# Patient Record
Sex: MALE | Race: WHITE | NOT HISPANIC OR LATINO | Employment: OTHER | URBAN - NONMETROPOLITAN AREA
[De-identification: names, ages, dates, MRNs, and addresses within clinical notes are randomized per-mention and may not be internally consistent; named-entity substitution may affect disease eponyms.]

---

## 2017-01-12 ENCOUNTER — TRANSCRIBE ORDERS (OUTPATIENT)
Dept: CARDIAC SURGERY | Facility: CLINIC | Age: 67
End: 2017-01-12

## 2017-01-12 DIAGNOSIS — R60.0 EDEMA OF EXTREMITIES: Primary | ICD-10-CM

## 2017-03-16 ENCOUNTER — OFFICE VISIT (OUTPATIENT)
Dept: CARDIOLOGY | Facility: CLINIC | Age: 67
End: 2017-03-16

## 2017-03-16 VITALS
BODY MASS INDEX: 31.18 KG/M2 | HEIGHT: 66 IN | SYSTOLIC BLOOD PRESSURE: 150 MMHG | HEART RATE: 66 BPM | WEIGHT: 194 LBS | DIASTOLIC BLOOD PRESSURE: 80 MMHG

## 2017-03-16 DIAGNOSIS — Z95.1 PRESENCE OF AORTOCORONARY BYPASS GRAFT: ICD-10-CM

## 2017-03-16 DIAGNOSIS — E78.00 PURE HYPERCHOLESTEROLEMIA: ICD-10-CM

## 2017-03-16 DIAGNOSIS — I10 ESSENTIAL HYPERTENSION: ICD-10-CM

## 2017-03-16 DIAGNOSIS — I25.10 ATHEROSCLEROSIS OF NATIVE CORONARY ARTERY OF NATIVE HEART WITHOUT ANGINA PECTORIS: Primary | ICD-10-CM

## 2017-03-16 PROCEDURE — 99214 OFFICE O/P EST MOD 30 MIN: CPT | Performed by: INTERNAL MEDICINE

## 2017-03-16 RX ORDER — LISINOPRIL 40 MG/1
40 TABLET ORAL 2 TIMES DAILY
Qty: 30 TABLET | Refills: 11 | Status: SHIPPED | OUTPATIENT
Start: 2017-03-16 | End: 2018-01-22

## 2017-03-16 NOTE — PROGRESS NOTES
Kenya Torres  66 y.o. male    12/14/2016  1. Atherosclerosis of native coronary artery of native heart without angina pectoris    2. Presence of aortocoronary bypass graft    3. Essential hypertension    4. Pure hypercholesterolemia        Chief complaint -follow-up CAD and mildly elevated blood pressure      History of present [id number]-old gentleman who has history of coronary disease status post CABG with two-vessel bypass with LIMA to LAD, saphenous vein graft to PDA and PLV branch.  He hasn't been able to tolerate any other cholesterol medicines.  And going to try to get him repatha through the indigent program.  He denies any chest pain and his blood pressure has been staying high and I increased his lisinopril to 40 mg twice a day.  He denies any weight gain or weight loss no TIA symptoms.  He has no GI symptoms either                Allergies   Allergen Reactions   • Cefoxitin Anaphylaxis   • Crestor [Rosuvastatin Calcium] Other (See Comments)     Other allergic reaction   • Hydrocodone Itching   • Lipitor [Atorvastatin]    • Niacin And Related    • Pravastatin Other (See Comments)     Other allergic reaction   • Zetia [Ezetimibe] Myalgia   • Zocor [Simvastatin] Other (See Comments)     Other allergic reaction   • Lortab [Hydrocodone-Acetaminophen] Rash         Past Medical History   Diagnosis Date   • CAD (coronary artery disease)    • Chest pain    • Contusion of trunk    • Coronary arteriosclerosis      2 V CAD post ptca X 3 (Cypher->RCA 2008 & Xience 2011; Xience->OM 7/2009)   • Coronary atherosclerosis of native coronary artery    • Diarrhea    • Diverticulitis of colon    • Essential hypertension    • Family history of malignant neoplasm of gastrointestinal tract    • Gastroesophageal reflux disease    • GERD (gastroesophageal reflux disease)    • History of echocardiogram 04/23/2015     Echocardiogram W/ color flow 73231 (1) - Normal LV systolic function with Ef of 60-65%. LVH and diastolic  abnormality of left ventricle.Moderate left atrial enlargement. Mild mitral and tricuspid regurgitation.Trace AV insufficiency.   • History of echocardiogram 12/27/2011     Echocardiogram 53072 (2) - Normal LV systolic function. EF 50%. Early diastolic dysfunction. Trace pulmonic regurgitation.   • History of EKG 05/21/2015     EKG Tracing and Interpretation 33413 (1) - [facility] ([facility]   • Hyperlipidemia    • Hypertension    • Male erectile disorder      ? meds vs local pathology   • Presence of aortocoronary bypass graft    • Raised prostate specific antigen    • Surgical follow-up care      Surgical follow-up     • Upper respiratory infection      resolved   • Viral gastroenteritis          Past Surgical History   Procedure Laterality Date   • Colon surgery     • Appendectomy  07/22/2008   • Coronary artery bypass graft  05/05/2015     CABG (1) - CABG x 3. Placement of LIMA>LAD. SVG>PDA. Endoscopic vein harvest.   • Cardiac catheterization  04/23/2015     Cardiac cath 21634 (2) - Two vessel coronary artery disease with multiple tandem lesions with normal LV systolic function.   • Cholecystectomy  05/28/2004     Cholecystectomy, laparoscopic (1)   • Endoscopy and colonoscopy  05/06/2004     Colon endoscopy 30801 (1) - plus operative flex sig 2008, tics only   • Colonoscopy  09/09/2014     Colonoscopy, diagnostic (screening) 88082- Prior resection of the sigmoid colon was found. Stool aspirated for study. Hemorrhoids found.   • Colostomy  07/22/2008     Colostomy (1) - transverse for diverticulitis, closed 10/23/2008   • Coronary stent placement  07/08/2009     Coronary artery stent placement (1) - obtuse marginal, Dr Sykes   • Endoscopy  10/17/2003     EGD w/ tube 52691 (1)         Family History   Problem Relation Age of Onset   • Hearing loss Mother    • Coronary artery disease Father    • Heart disease Father    • Other Father      CABG   • Macular degeneration Father    • Coronary artery  disease Brother    • Heart disease Brother    • Other Brother      62 CABG  fundoplication   • Coronary artery disease Maternal Grandfather    • Heart disease Maternal Grandfather    • Sudden death Maternal Grandfather    • Diabetes Other    • Hearing loss Other    • Hypertension Other    • Skin cancer Other    • Stroke Other    • Vision loss Other    • Coronary artery disease Other          Social History     Social History   • Marital status:      Spouse name: N/A   • Number of children: N/A   • Years of education: N/A     Occupational History   • Not on file.     Social History Main Topics   • Smoking status: Never Smoker   • Smokeless tobacco: Never Used   • Alcohol use No   • Drug use: No   • Sexual activity: Defer      Comment:      Other Topics Concern   • Not on file     Social History Narrative         Current Outpatient Prescriptions   Medication Sig Dispense Refill   • clopidogrel (PLAVIX) 75 MG tablet Take 75 mg by mouth Daily.     • diltiaZEM CD (CARDIZEM CD) 240 MG 24 hr capsule Take 240 mg by mouth Daily.     • diphenhydrAMINE-acetaminophen (TYLENOL PM)  MG tablet per tablet Take 1 tablet by mouth At Night As Needed for sleep.     • lisinopril (PRINIVIL,ZESTRIL) 40 MG tablet Take 1 tablet by mouth 2 (Two) Times a Day. 30 tablet 11   • Omega 3 1200 MG capsule Take 2 tablets by mouth Daily.     • omeprazole (priLOSEC) 20 MG capsule Take 20 mg by mouth Daily.     • Red Yeast Rice 600 MG capsule Take 1 tablet by mouth Daily.     • sertraline (ZOLOFT) 100 MG tablet Take 100 mg by mouth Daily.       No current facility-administered medications for this visit.          Review of Systems     Constitution: Denies any fatigue, fever or chills    HENT: Denies any headache, hearing impairment, nasal discharge or sore throat    Eyes: Denies any blurring of vision, or photophobia     Cardivascular - As per history of present illness     Respiratory system-denies any COPD, shortness of breath,  "sleep apnea.     Endocrine:   history of hyperlipidemia cannot tolerate statins or Zetia       Musculoskeletal:   history of arthritis, musculoskeletal problems    Gastrointestinal: No nausea, vomiting, or melena    Genitourinary: No dysuria or hematuria    Neurological:   No history of seizure disorder, stroke, memory problems    Psychiatric/Behavioral:        No history of depression, bipolar disorder or schizophrenia     Hematological- no history of easy bruising or any bleeding diathesis            OBJECTIVE    Visit Vitals   • /80   • Pulse 66   • Ht 66\" (167.6 cm)   • Wt 194 lb (88 kg)   • BMI 31.31 kg/m2         Physical Exam     Constitutional: is oriented to person, place, and time.     Skin-warm and dry    Well developed and nourished in no acute distress      Head: Normocephalic and atraumatic.     Eyes: Pupils are equal, round, and reactive to light.     Neck: Neck supple. No bruit in the carotids, no elevation of JVD    Cardiovascular: [place] in the fifth intercostal space, regular rate, and  Rhythm,  S1 greater than S2, no S3 or S4, no gallop   No murmur heard.    Pulmonary/Chest:   Air  Entry is equal on both sides  No wheezing or crackles,      Abdominal: Soft.  No hepatosplenomegaly, bowel sounds are present    Musculoskeletal: No kyphoscoliosis, no significant thickening of the joints    Neurological: is alert and oriented to person, place, and time.    cranial nerve are intact .   No motor or sensory deficit    Extremities-no edema,  pulses are felt equally on both sides, no radial femoral delay      Psychiatric: He has a normal mood and affect.                  His behavior is normal.           Procedures      Lab Results   Component Value Date    WBC 7.2 05/21/2015    HGB 11.1 (L) 05/21/2015    HCT 32.4 (L) 05/21/2015    MCV 92.0 05/21/2015     05/21/2015     Lab Results   Component Value Date    GLUCOSE 92 05/21/2015    BUN 17 05/21/2015    CREATININE 1.4 (H) 05/21/2015    CO2 25 " 05/21/2015    CALCIUM 8.6 05/21/2015    ALBUMIN 3.5 05/21/2015    AST 30 05/21/2015    ALT 36 05/21/2015     Lab Results   Component Value Date    HGBA1C 5.4 04/23/2015     Lab Results   Component Value Date    TSH 0.76 04/23/2015                  A/P    CAD status post CABG-on Plavix daily , and his blood pressure has been a little bit problem and I increased his lisinopril to 40 mg twice a day and continue Cardizem  mg daily.  Hyperlipidemia can't tolerate statins on Zetia, will try to get him PCSK9 inhibitors through indigent program.    Hypertension increased lisinopril to 40 mg twice a day and continue Cardizem CD.    Talked to him him about exercise . follow-up in 6 months       Jesus Sykes MD  3/16/2017  9:10 AM

## 2017-07-13 RX ORDER — CLOPIDOGREL BISULFATE 75 MG/1
TABLET ORAL
Qty: 90 TABLET | Refills: 2 | Status: SHIPPED | OUTPATIENT
Start: 2017-07-13 | End: 2018-08-20 | Stop reason: SDUPTHER

## 2017-12-06 ENCOUNTER — OFFICE VISIT (OUTPATIENT)
Dept: CARDIOLOGY | Facility: CLINIC | Age: 67
End: 2017-12-06

## 2017-12-06 VITALS
DIASTOLIC BLOOD PRESSURE: 78 MMHG | BODY MASS INDEX: 31.18 KG/M2 | HEART RATE: 70 BPM | WEIGHT: 194 LBS | SYSTOLIC BLOOD PRESSURE: 150 MMHG | HEIGHT: 66 IN

## 2017-12-06 DIAGNOSIS — E78.00 PURE HYPERCHOLESTEROLEMIA: ICD-10-CM

## 2017-12-06 DIAGNOSIS — I10 ESSENTIAL HYPERTENSION: ICD-10-CM

## 2017-12-06 DIAGNOSIS — I25.810 CORONARY ARTERY DISEASE INVOLVING CORONARY BYPASS GRAFT OF NATIVE HEART WITHOUT ANGINA PECTORIS: Primary | ICD-10-CM

## 2017-12-06 PROCEDURE — 99214 OFFICE O/P EST MOD 30 MIN: CPT | Performed by: INTERNAL MEDICINE

## 2017-12-06 RX ORDER — SPIRONOLACTONE AND HYDROCHLOROTHIAZIDE 25; 25 MG/1; MG/1
0.5 TABLET ORAL DAILY
Qty: 30 TABLET | Refills: 12 | Status: SHIPPED | OUTPATIENT
Start: 2017-12-06 | End: 2017-12-21

## 2017-12-06 NOTE — PROGRESS NOTES
[facility]  OFFICE NOTE    Kenya Torres  67 y.o. male    12/06/2017  1. Coronary artery disease involving coronary bypass graft of native heart without angina pectoris    2. Pure hypercholesterolemia    3. Essential hypertension        Chief complaint -Follow-up CAD      History of present [id number]-old gentleman with history of coronary disease status post 2 vessel bypass with LIMA to LAD and vein graft to the right coronary artery, doing well he cannot be on any statins including Zetia due to intolerance and myalgia.  I'm going to try to get him on PCSK9 inhibitors.  His blood pressures has been high and still not controlled, added half a tablet of Aldactazide and will check his blood pressure in one week and also will check a Chem-7 in 1 week.  Denies any GI symptoms or CNS symptoms              Allergies   Allergen Reactions   • Cefoxitin Anaphylaxis   • Crestor [Rosuvastatin Calcium] Other (See Comments)     Other allergic reaction   • Hydrocodone Itching   • Lipitor [Atorvastatin]    • Niacin And Related    • Pravastatin Other (See Comments)     Other allergic reaction   • Zetia [Ezetimibe] Myalgia   • Zocor [Simvastatin] Other (See Comments)     Other allergic reaction   • Lortab [Hydrocodone-Acetaminophen] Rash         Past Medical History:   Diagnosis Date   • CAD (coronary artery disease)    • Chest pain    • Contusion of trunk    • Coronary arteriosclerosis     2 V CAD post ptca X 3 (Cypher->RCA 2008 & Xience 2011; Xience->OM 7/2009)   • Coronary atherosclerosis of native coronary artery    • Diarrhea    • Diverticulitis of colon    • Essential hypertension    • Family history of malignant neoplasm of gastrointestinal tract    • Gastroesophageal reflux disease    • GERD (gastroesophageal reflux disease)    • History of echocardiogram 04/23/2015    Echocardiogram W/ color flow 45564 (1) - Normal LV systolic function with Ef of 60-65%. LVH and diastolic abnormality  of left ventricle.Moderate left atrial enlargement. Mild mitral and tricuspid regurgitation.Trace AV insufficiency.   • History of echocardiogram 12/27/2011    Echocardiogram 19114 (2) - Normal LV systolic function. EF 50%. Early diastolic dysfunction. Trace pulmonic regurgitation.   • History of EKG 05/21/2015    EKG Tracing and Interpretation 45939 (1) - [facility] ([facility])   • Hyperlipidemia    • Hypertension    • Male erectile disorder     ? meds vs local pathology   • Presence of aortocoronary bypass graft    • Raised prostate specific antigen    • Surgical follow-up care     Surgical follow-up     • Upper respiratory infection     resolved   • Viral gastroenteritis          Past Surgical History:   Procedure Laterality Date   • APPENDECTOMY  07/22/2008   • CARDIAC CATHETERIZATION  04/23/2015    Cardiac cath 44433 (2) - Two vessel coronary artery disease with multiple tandem lesions with normal LV systolic function.   • CHOLECYSTECTOMY  05/28/2004    Cholecystectomy, laparoscopic (1)   • COLON SURGERY     • COLONOSCOPY  09/09/2014    Colonoscopy, diagnostic (screening) 22316- Prior resection of the sigmoid colon was found. Stool aspirated for study. Hemorrhoids found.   • COLOSTOMY  07/22/2008    Colostomy (1) - transverse for diverticulitis, closed 10/23/2008   • CORONARY ARTERY BYPASS GRAFT  05/05/2015    CABG (1) - CABG x 3. Placement of LIMA>LAD. SVG>PDA. Endoscopic vein harvest.   • CORONARY STENT PLACEMENT  07/08/2009    Coronary artery stent placement (1) - obtuse marginalDr Sykes   • ENDOSCOPY  10/17/2003    EGD w/ tube 55129 (1)   • ENDOSCOPY AND COLONOSCOPY  05/06/2004    Colon endoscopy 03539 (1) - plus operative flex sig 2008, tics only         Family History   Problem Relation Age of Onset   • Hearing loss Mother    • Coronary artery disease Father    • Heart disease Father    • Other Father      CABG   • Macular degeneration Father    • Coronary artery disease Brother    • Heart  disease Brother    • Other Brother      61 CABG  fundoplication   • Coronary artery disease Maternal Grandfather    • Heart disease Maternal Grandfather    • Sudden death Maternal Grandfather    • Diabetes Other    • Hearing loss Other    • Hypertension Other    • Skin cancer Other    • Stroke Other    • Vision loss Other    • Coronary artery disease Other          Social History     Social History   • Marital status:      Spouse name: N/A   • Number of children: N/A   • Years of education: N/A     Occupational History   • Not on file.     Social History Main Topics   • Smoking status: Never Smoker   • Smokeless tobacco: Never Used   • Alcohol use No   • Drug use: No   • Sexual activity: Defer      Comment:      Other Topics Concern   • Not on file     Social History Narrative         Current Outpatient Prescriptions   Medication Sig Dispense Refill   • clopidogrel (PLAVIX) 75 MG tablet TAKE ONE TABLET BY MOUTH EVERY DAY 90 tablet 2   • diltiaZEM CD (CARDIZEM CD) 240 MG 24 hr capsule Take 240 mg by mouth Daily.     • diphenhydrAMINE-acetaminophen (TYLENOL PM)  MG tablet per tablet Take 1 tablet by mouth At Night As Needed for sleep.     • lisinopril (PRINIVIL,ZESTRIL) 40 MG tablet Take 1 tablet by mouth 2 (Two) Times a Day. 30 tablet 11   • Omega 3 1200 MG capsule Take 2 tablets by mouth Daily.     • omeprazole (priLOSEC) 20 MG capsule Take 20 mg by mouth Daily.     • Red Yeast Rice 600 MG capsule Take 1 tablet by mouth Daily.     • sertraline (ZOLOFT) 100 MG tablet Take 100 mg by mouth Daily.     • spironolactone-hydrochlorothiazide (ALDACTAZIDE) 25-25 MG tablet Take 0.5 tablets by mouth Daily. 30 tablet 12     No current facility-administered medications for this visit.          Review of Systems     Constitution: Denies any fatigue, fever or chills    HENT: Denies any headache, hearing impairment,     Eyes: Denies any blurring of vision, or photophobia     Cardivascular - As per history of  "present illness     Respiratory system- shortness of breath,   sleep apnea.     Endocrine:   history of hyperlipidemia, diabetes,         Musculoskeletal:   history of arthritis with musculoskeletal problems    Gastrointestinal: No nausea, vomiting, or melena    Genitourinary: No dysuria or hematuria    Neurological:   No history of seizure disorder, stroke, memory problems    Psychiatric/Behavioral:         history of depression,  No history of bipolar disorder or schizophrenia     Hematological- no history of easy bruising or any bleeding diathesis            OBJECTIVE    /78  Pulse 70  Ht 167.6 cm (66\")  Wt 88 kg (194 lb)  BMI 31.31 kg/m2      Physical Exam     Constitutional: is oriented to person, place, and time.     Skin-warm and dry    Well developed and nourished in no acute distress      Head: Normocephalic and atraumatic.     Eyes: Pupils are equal,    Neck: Neck supple. No bruit in the carotids    Cardiovascular: [place] in the fifth intercostal space   Regular rate, and  Rhythm,    S1 greater than S2, no S3 or S4, no gallop     Pulmonary/Chest:   Air  Entry is equal on both sides  No wheezing or crackles,      Abdominal: Soft.  No hepatosplenomegaly, bowel sounds are present    Musculoskeletal: No kyphoscoliosis, no significant thickening of the joints    Neurological: is alert and oriented to person, place, and time.    cranial nerve are intact .   No motor or sensory deficit    Extremities-no edema, no radial femoral delay      Psychiatric: He has a normal mood and affect.                  His behavior is normal.           Procedures      Lab Results   Component Value Date    WBC 7.2 05/21/2015    HGB 11.1 (L) 05/21/2015    HCT 32.4 (L) 05/21/2015    MCV 92.0 05/21/2015     05/21/2015     Lab Results   Component Value Date    GLUCOSE 92 05/21/2015    BUN 17 05/21/2015    CREATININE 1.4 (H) 05/21/2015    CO2 25 05/21/2015    CALCIUM 8.6 05/21/2015    ALBUMIN 3.5 05/21/2015    AST 30 " 05/21/2015    ALT 36 05/21/2015     No results found for: CHOL  No results found for: TRIG  No results found for: HDL  No results found for: LDLCALC  No results found for: LDL  No results found for: HDLLDLRATIO  No components found for: CHOLHDL  Lab Results   Component Value Date    HGBA1C 5.4 04/23/2015     Lab Results   Component Value Date    TSH 0.76 04/23/2015                  A/P    CAD status post CABG with two-vessel bypass, doing well on antiplatelet therapy with Plavix 75 mg daily no chest pain or shortness of breath.    Hypertension-not well controlled with the Cardizem CD, lisinopril, added Aldactazide half a tablet in the morning.    Hyperlipidemia cannot tolerate statins on red yeast Rice and will try to get PCSK9 inhibitors.    He'll come for blood pressure check and a Chem-7 next week, and follow with me in 6 months or earlier if any problems              This document has been electronically signed by Jesus Sykes MD on December 6, 2017 4:00 PM       EMR Dragon/Transcription disclaimer:   Some of this note may be an electronic transcription/translation of spoken language to printed text. The electronic translation of spoken language may permit erroneous, or at times, nonsensical words or phrases to be inadvertently transcribed; Although I have reviewed the note for such errors, some may still exist.

## 2017-12-12 ENCOUNTER — LAB (OUTPATIENT)
Dept: LAB | Facility: HOSPITAL | Age: 67
End: 2017-12-12

## 2017-12-12 DIAGNOSIS — E78.00 PURE HYPERCHOLESTEROLEMIA: ICD-10-CM

## 2017-12-12 DIAGNOSIS — I10 ESSENTIAL HYPERTENSION: ICD-10-CM

## 2017-12-12 DIAGNOSIS — I25.810 CORONARY ARTERY DISEASE INVOLVING CORONARY BYPASS GRAFT OF NATIVE HEART WITHOUT ANGINA PECTORIS: ICD-10-CM

## 2017-12-12 LAB
ANION GAP SERPL CALCULATED.3IONS-SCNC: 13 MMOL/L (ref 5–15)
BUN BLD-MCNC: 23 MG/DL (ref 7–21)
BUN/CREAT SERPL: 13.4 (ref 7–25)
CALCIUM SPEC-SCNC: 10 MG/DL (ref 8.4–10.2)
CHLORIDE SERPL-SCNC: 104 MMOL/L (ref 95–110)
CO2 SERPL-SCNC: 23 MMOL/L (ref 22–31)
CREAT BLD-MCNC: 1.72 MG/DL (ref 0.7–1.3)
GFR SERPL CREATININE-BSD FRML MDRD: 40 ML/MIN/1.73 (ref 60–113)
GLUCOSE BLD-MCNC: 97 MG/DL (ref 60–100)
POTASSIUM BLD-SCNC: 3.8 MMOL/L (ref 3.5–5.1)
SODIUM BLD-SCNC: 140 MMOL/L (ref 137–145)

## 2017-12-12 PROCEDURE — 36415 COLL VENOUS BLD VENIPUNCTURE: CPT

## 2017-12-12 PROCEDURE — 80048 BASIC METABOLIC PNL TOTAL CA: CPT

## 2017-12-12 RX ORDER — HYDRALAZINE HYDROCHLORIDE 50 MG/1
50 TABLET, FILM COATED ORAL 2 TIMES DAILY
Qty: 60 TABLET | Refills: 6 | Status: SHIPPED | OUTPATIENT
Start: 2017-12-12 | End: 2017-12-21 | Stop reason: DRUGHIGH

## 2017-12-21 RX ORDER — AMLODIPINE BESYLATE 10 MG/1
10 TABLET ORAL NIGHTLY
Qty: 30 TABLET | Refills: 6 | Status: SHIPPED | OUTPATIENT
Start: 2017-12-21 | End: 2018-05-29 | Stop reason: SINTOL

## 2017-12-21 RX ORDER — HYDRALAZINE HYDROCHLORIDE 50 MG/1
50 TABLET, FILM COATED ORAL 3 TIMES DAILY
Qty: 90 TABLET | Refills: 6 | Status: SHIPPED | OUTPATIENT
Start: 2017-12-21 | End: 2018-05-29 | Stop reason: SDUPTHER

## 2017-12-21 RX ORDER — LABETALOL 100 MG/1
100 TABLET, FILM COATED ORAL 2 TIMES DAILY
COMMUNITY
End: 2017-12-21 | Stop reason: SDUPTHER

## 2017-12-21 RX ORDER — LABETALOL 100 MG/1
100 TABLET, FILM COATED ORAL EVERY 12 HOURS SCHEDULED
Qty: 60 TABLET | Refills: 6 | Status: SHIPPED | OUTPATIENT
Start: 2017-12-21 | End: 2019-01-03 | Stop reason: SDUPTHER

## 2017-12-21 RX ORDER — AMLODIPINE BESYLATE 10 MG/1
10 TABLET ORAL NIGHTLY
COMMUNITY
End: 2017-12-21 | Stop reason: SDUPTHER

## 2017-12-21 RX ORDER — HYDRALAZINE HYDROCHLORIDE 50 MG/1
50 TABLET, FILM COATED ORAL 3 TIMES DAILY
COMMUNITY
End: 2017-12-21 | Stop reason: SDUPTHER

## 2017-12-21 NOTE — TELEPHONE ENCOUNTER
Pt called stating b/p running high.  MD made medication adjustments as charted.  Returned call to pt, he gave his wife permission to take information.  Explained to stop Cardizem and Aldactazide.  Pt scribed all instructions for new medications and e-scribes sent.  Educated pt to call for any questions.

## 2018-01-22 ENCOUNTER — OFFICE VISIT (OUTPATIENT)
Dept: CARDIOLOGY | Facility: CLINIC | Age: 68
End: 2018-01-22

## 2018-01-22 VITALS
BODY MASS INDEX: 31.18 KG/M2 | DIASTOLIC BLOOD PRESSURE: 72 MMHG | HEIGHT: 66 IN | WEIGHT: 194 LBS | SYSTOLIC BLOOD PRESSURE: 144 MMHG | HEART RATE: 66 BPM

## 2018-01-22 DIAGNOSIS — I25.810 CORONARY ARTERY DISEASE INVOLVING CORONARY BYPASS GRAFT OF NATIVE HEART WITHOUT ANGINA PECTORIS: Primary | ICD-10-CM

## 2018-01-22 DIAGNOSIS — I73.9 PERIPHERAL ARTERIAL DISEASE (HCC): ICD-10-CM

## 2018-01-22 DIAGNOSIS — I10 ESSENTIAL HYPERTENSION: ICD-10-CM

## 2018-01-22 DIAGNOSIS — E78.00 PURE HYPERCHOLESTEROLEMIA: ICD-10-CM

## 2018-01-22 DIAGNOSIS — N18.30 STAGE 3 CHRONIC KIDNEY DISEASE (HCC): ICD-10-CM

## 2018-01-22 PROCEDURE — 93000 ELECTROCARDIOGRAM COMPLETE: CPT | Performed by: INTERNAL MEDICINE

## 2018-01-22 PROCEDURE — 99214 OFFICE O/P EST MOD 30 MIN: CPT | Performed by: INTERNAL MEDICINE

## 2018-01-22 NOTE — PROGRESS NOTES
[facility]  OFFICE NOTE    Kenya Torres  67 y.o. male    1/22/2018  1. Coronary artery disease involving coronary bypass graft of native heart without angina pectoris    2. Peripheral arterial disease    3. Essential hypertension    4. Pure hypercholesterolemia    5. Stage 3 chronic kidney disease        Chief complaint -Follow-up CAD And recent hospitalization for bowel obstruction      History of present Zjuphnj-11-xzhz-old gentleman with history of coronary disease status post 2 vessel bypass with LIMA to LAD and vein graft to the right coronary artery, doing well he cannot be on any statins including Zetia due to intolerance and myalgia.  I'm going to try to get him on PCSK9 inhibitors.  He was at [facility] with bowel obstruction and he had worsening renal function and his last creatinine was 1.9.  His EKG did not show any abnormalities.  He is off of its inhibitors.  He could not tolerate statins I'm going to set him up for the cholesterol medication  PCSK9 inhibitors through the company.              Allergies   Allergen Reactions   • Cefoxitin Anaphylaxis   • Crestor [Rosuvastatin Calcium] Other (See Comments)     Other allergic reaction   • Hydrocodone Itching   • Lipitor [Atorvastatin]    • Niacin And Related    • Pravastatin Other (See Comments)     Other allergic reaction   • Zetia [Ezetimibe] Myalgia   • Zocor [Simvastatin] Other (See Comments)     Other allergic reaction   • Lortab [Hydrocodone-Acetaminophen] Rash         Past Medical History:   Diagnosis Date   • CAD (coronary artery disease)    • Chest pain    • Contusion of trunk    • Coronary arteriosclerosis     2 V CAD post ptca X 3 (Cypher->RCA 2008 & Xience 2011; Xience->OM 7/2009)   • Coronary atherosclerosis of native coronary artery    • Diarrhea    • Diverticulitis of colon    • Essential hypertension    • Family history of malignant neoplasm of gastrointestinal tract    • Gastroesophageal  reflux disease    • GERD (gastroesophageal reflux disease)    • History of echocardiogram 04/23/2015    Echocardiogram W/ color flow 30856 (1) - Normal LV systolic function with Ef of 60-65%. LVH and diastolic abnormality of left ventricle.Moderate left atrial enlargement. Mild mitral and tricuspid regurgitation.Trace AV insufficiency.   • History of echocardiogram 12/27/2011    Echocardiogram 72932 (2) - Normal LV systolic function. EF 50%. Early diastolic dysfunction. Trace pulmonic regurgitation.   • History of EKG 05/21/2015    EKG Tracing and Interpretation 62774 (1) - [facility] ([facility])   • Hyperlipidemia    • Hypertension    • Male erectile disorder     ? meds vs local pathology   • Presence of aortocoronary bypass graft    • Raised prostate specific antigen    • Surgical follow-up care     Surgical follow-up     • Upper respiratory infection     resolved   • Viral gastroenteritis          Past Surgical History:   Procedure Laterality Date   • APPENDECTOMY  07/22/2008   • CARDIAC CATHETERIZATION  04/23/2015    Cardiac cath 05808 (2) - Two vessel coronary artery disease with multiple tandem lesions with normal LV systolic function.   • CHOLECYSTECTOMY  05/28/2004    Cholecystectomy, laparoscopic (1)   • COLON SURGERY     • COLONOSCOPY  09/09/2014    Colonoscopy, diagnostic (screening) 09658- Prior resection of the sigmoid colon was found. Stool aspirated for study. Hemorrhoids found.   • COLOSTOMY  07/22/2008    Colostomy (1) - transverse for diverticulitis, closed 10/23/2008   • CORONARY ARTERY BYPASS GRAFT  05/05/2015    CABG (1) - CABG x 3. Placement of LIMA>LAD. SVG>PDA. Endoscopic vein harvest.   • CORONARY STENT PLACEMENT  07/08/2009    Coronary artery stent placement (1) - obtuse marginal, Dr Sykes   • ENDOSCOPY  10/17/2003    EGD w/ tube 39746 (1)   • ENDOSCOPY AND COLONOSCOPY  05/06/2004    Colon endoscopy 14298 (1) - plus operative flex sig 2008, tics only         Family History    Problem Relation Age of Onset   • Hearing loss Mother    • Coronary artery disease Father    • Heart disease Father    • Other Father      CABG   • Macular degeneration Father    • Coronary artery disease Brother    • Heart disease Brother    • Other Brother      62 CABG  fundoplication   • Coronary artery disease Maternal Grandfather    • Heart disease Maternal Grandfather    • Sudden death Maternal Grandfather    • Diabetes Other    • Hearing loss Other    • Hypertension Other    • Skin cancer Other    • Stroke Other    • Vision loss Other    • Coronary artery disease Other          Social History     Social History   • Marital status:      Spouse name: N/A   • Number of children: N/A   • Years of education: N/A     Occupational History   • Not on file.     Social History Main Topics   • Smoking status: Never Smoker   • Smokeless tobacco: Never Used   • Alcohol use No   • Drug use: No   • Sexual activity: Defer      Comment:      Other Topics Concern   • Not on file     Social History Narrative         Current Outpatient Prescriptions   Medication Sig Dispense Refill   • amLODIPine (NORVASC) 10 MG tablet Take 1 tablet by mouth Every Night. 30 tablet 6   • clopidogrel (PLAVIX) 75 MG tablet TAKE ONE TABLET BY MOUTH EVERY DAY 90 tablet 2   • diphenhydrAMINE-acetaminophen (TYLENOL PM)  MG tablet per tablet Take 1 tablet by mouth At Night As Needed for sleep.     • hydrALAZINE (APRESOLINE) 50 MG tablet Take 1 tablet by mouth 3 (Three) Times a Day. 90 tablet 6   • labetalol (NORMODYNE) 100 MG tablet Take 1 tablet by mouth Every 12 (Twelve) Hours. 60 tablet 6   • Omega 3 1200 MG capsule Take 2 tablets by mouth Daily.     • omeprazole (priLOSEC) 20 MG capsule Take 20 mg by mouth Daily.     • sertraline (ZOLOFT) 100 MG tablet Take 100 mg by mouth Daily.       No current facility-administered medications for this visit.          Review of Systems     Constitution: Denies any fatigue, fever or  "chills    HENT: Denies any headache, hearing impairment,     Eyes: Denies any blurring of vision, or photophobia     Cardivascular - As per history of present illness     Respiratory system- shortness of breath,   sleep apnea.     Endocrine:   history of hyperlipidemia, diabetes,         Musculoskeletal:   history of arthritis with musculoskeletal problems    Gastrointestinal: No nausea, vomiting, or melena    Genitourinary: No dysuria or hematuria    Neurological:   No history of seizure disorder, stroke, memory problems    Psychiatric/Behavioral:         history of depression,  No history of bipolar disorder or schizophrenia     Hematological- no history of easy bruising or any bleeding diathesis            OBJECTIVE    /72  Pulse 66  Ht 167.6 cm (65.98\")  Wt 88 kg (194 lb)  BMI 31.33 kg/m2      Physical Exam     Constitutional: is oriented to person, place, and time.     Skin-warm and dry    Well developed and nourished in no acute distress      Head: Normocephalic and atraumatic.     Eyes: Pupils are equal,    Neck: Neck supple. No bruit in the carotids    Cardiovascular: [place] in the fifth intercostal space   Regular rate, and  Rhythm,    S1 greater than S2, no S3 or S4, no gallop     Pulmonary/Chest:   Air  Entry is equal on both sides  No wheezing or crackles,      Abdominal: Soft.  No hepatosplenomegaly,    Musculoskeletal: No kyphoscoliosis, no significant thickening of the joints    Neurological: is alert and oriented to person, place, and time.    cranial nerve are intact .   No motor or sensory deficit    Extremities-no edema, no radial femoral delay      Psychiatric: He has a normal mood and affect.                  His behavior is normal.             ECG 12 Lead  Date/Time: 1/22/2018 11:53 AM  Performed by: MARGARET TONG  Authorized by: MARGARET TONG   Comparison: not compared with previous ECG   Rhythm: sinus rhythm  Comments: Sinus rhythm with LVH              Lab Results "   Component Value Date    WBC 7.2 05/21/2015    HGB 11.1 (L) 05/21/2015    HCT 32.4 (L) 05/21/2015    MCV 92.0 05/21/2015     05/21/2015     Lab Results   Component Value Date    GLUCOSE 97 12/12/2017    BUN 23 (H) 12/12/2017    CREATININE 1.72 (H) 12/12/2017    EGFRIFNONA 40 (L) 12/12/2017    BCR 13.4 12/12/2017    CO2 23.0 12/12/2017    CALCIUM 10.0 12/12/2017    ALBUMIN 3.5 05/21/2015    AST 30 05/21/2015    ALT 36 05/21/2015                    A/P    Recent hospitalization for bowel obstruction with history of bowel resection in the past for diverticulosis.  Doing okay with having regular bowel movements .going to improve his diet with stool softener or fiber in the diet    CAD status post CABG with two-vessel bypass, doing well on antiplatelet therapy with Plavix 75 mg daily no chest pain or shortness of breath.    Hypertension- Currently on amlodipine, hydralazine and labetalol    Hyperlipidemia cannot tolerate statins on red yeast Rice and will try to get PCSK9 inhibitors.    Follow-up in 6 months              This document has been electronically signed by Jesus Sykes MD on January 22, 2018 11:53 AM       EMR Dragon/Transcription disclaimer:   Some of this note may be an electronic transcription/translation of spoken language to printed text. The electronic translation of spoken language may permit erroneous, or at times, nonsensical words or phrases to be inadvertently transcribed; Although I have reviewed the note for such errors, some may still exist.

## 2018-05-29 ENCOUNTER — OFFICE VISIT (OUTPATIENT)
Dept: CARDIOLOGY | Facility: CLINIC | Age: 68
End: 2018-05-29

## 2018-05-29 VITALS
DIASTOLIC BLOOD PRESSURE: 80 MMHG | HEIGHT: 66 IN | SYSTOLIC BLOOD PRESSURE: 150 MMHG | BODY MASS INDEX: 31.34 KG/M2 | HEART RATE: 76 BPM | WEIGHT: 195 LBS

## 2018-05-29 DIAGNOSIS — I13.0 HYPERTENSIVE HEART AND CHRONIC KIDNEY DISEASE WITH HEART FAILURE AND STAGE 1 THROUGH STAGE 4 CHRONIC KIDNEY DISEASE, OR CHRONIC KIDNEY DISEASE (HCC): ICD-10-CM

## 2018-05-29 DIAGNOSIS — I25.810 CORONARY ARTERY DISEASE INVOLVING CORONARY BYPASS GRAFT OF NATIVE HEART WITHOUT ANGINA PECTORIS: Primary | ICD-10-CM

## 2018-05-29 DIAGNOSIS — I10 ESSENTIAL HYPERTENSION: ICD-10-CM

## 2018-05-29 DIAGNOSIS — N18.30 STAGE 3 CHRONIC KIDNEY DISEASE (HCC): ICD-10-CM

## 2018-05-29 DIAGNOSIS — I20.9 ANGINA PECTORIS (HCC): ICD-10-CM

## 2018-05-29 DIAGNOSIS — E78.00 PURE HYPERCHOLESTEROLEMIA: ICD-10-CM

## 2018-05-29 PROCEDURE — 93000 ELECTROCARDIOGRAM COMPLETE: CPT | Performed by: INTERNAL MEDICINE

## 2018-05-29 PROCEDURE — 99214 OFFICE O/P EST MOD 30 MIN: CPT | Performed by: INTERNAL MEDICINE

## 2018-05-29 RX ORDER — HYDRALAZINE HYDROCHLORIDE 50 MG/1
50 TABLET, FILM COATED ORAL 2 TIMES DAILY
Qty: 90 TABLET | Refills: 6 | Status: SHIPPED | OUTPATIENT
Start: 2018-05-29 | End: 2018-06-21 | Stop reason: SDUPTHER

## 2018-05-29 RX ORDER — ISOSORBIDE MONONITRATE 30 MG/1
30 TABLET, EXTENDED RELEASE ORAL DAILY
Qty: 30 TABLET | Refills: 11 | Status: SHIPPED | OUTPATIENT
Start: 2018-05-29 | End: 2019-12-22 | Stop reason: HOSPADM

## 2018-05-29 NOTE — PROGRESS NOTES
[facility]  OFFICE NOTE    Kenya Torres  68 y.o. male    05/29/2018  1. Coronary artery disease involving coronary bypass graft of native heart without angina pectoris    2. Pure hypercholesterolemia    3. Essential hypertension    4. Stage 3 chronic kidney disease    5. Angina pectoris    6. Hypertensive heart and chronic kidney disease with heart failure and stage 1 through stage 4 chronic kidney disease, or chronic kidney disease         Chief complaint -Uncontrolled hypertension and angina      History of present Aldzqam-89-ukoh-old gentleman with history of coronary disease S/P two vessel bypass with LIMA to LAD and vein graft to the right coronary artery,  he cannot be on any statins including Zetia due to intolerance and myalgia.  I will   try to get him on PCSK9 inhibitors.  He has been having LAD much bradycardia between his blood pressure and having chest pain and had to come off of amlodipine due to edema.  His EKG showed sinus rhythm with LVH.  Put him on isosorbide 30 mg daily, change the hydralazine to 50 mg  3 times a day, and continue labetalol 100 twice a day.  Since his kidney function is getting worse will refer him to Dr. Medina as he is developing hypertensive renal disease.  Will get an echo to assess LV function and valvular function.          Allergies   Allergen Reactions   • Cefoxitin Anaphylaxis   • Crestor [Rosuvastatin Calcium] Other (See Comments)     Other allergic reaction   • Hydrocodone Itching   • Lipitor [Atorvastatin]    • Niacin And Related    • Pravastatin Other (See Comments)     Other allergic reaction   • Zetia [Ezetimibe] Myalgia   • Zocor [Simvastatin] Other (See Comments)     Other allergic reaction   • Lortab [Hydrocodone-Acetaminophen] Rash         Past Medical History:   Diagnosis Date   • CAD (coronary artery disease)    • Chest pain    • Contusion of trunk    • Coronary arteriosclerosis     2 V CAD post ptca X 3 (Cypher->RCA 2008 &  Xience 2011; Xience->OM 7/2009)   • Coronary atherosclerosis of native coronary artery    • Diarrhea    • Diverticulitis of colon    • Essential hypertension    • Family history of malignant neoplasm of gastrointestinal tract    • Gastroesophageal reflux disease    • GERD (gastroesophageal reflux disease)    • History of echocardiogram 04/23/2015    Echocardiogram W/ color flow 68951 (1) - Normal LV systolic function with Ef of 60-65%. LVH and diastolic abnormality of left ventricle.Moderate left atrial enlargement. Mild mitral and tricuspid regurgitation.Trace AV insufficiency.   • History of echocardiogram 12/27/2011    Echocardiogram 88672 (2) - Normal LV systolic function. EF 50%. Early diastolic dysfunction. Trace pulmonic regurgitation.   • History of EKG 05/21/2015    EKG Tracing and Interpretation 96865 (1) - [facility] ([facility])   • Hyperlipidemia    • Hypertension    • Male erectile disorder     ? meds vs local pathology   • Presence of aortocoronary bypass graft    • Raised prostate specific antigen    • Surgical follow-up care     Surgical follow-up     • Upper respiratory infection     resolved   • Viral gastroenteritis          Past Surgical History:   Procedure Laterality Date   • APPENDECTOMY  07/22/2008   • CARDIAC CATHETERIZATION  04/23/2015    Cardiac cath 21747 (2) - Two vessel coronary artery disease with multiple tandem lesions with normal LV systolic function.   • CHOLECYSTECTOMY  05/28/2004    Cholecystectomy, laparoscopic (1)   • COLON SURGERY     • COLONOSCOPY  09/09/2014    Colonoscopy, diagnostic (screening) 47282- Prior resection of the sigmoid colon was found. Stool aspirated for study. Hemorrhoids found.   • COLOSTOMY  07/22/2008    Colostomy (1) - transverse for diverticulitis, closed 10/23/2008   • CORONARY ARTERY BYPASS GRAFT  05/05/2015    CABG (1) - CABG x 3. Placement of LIMA>LAD. SVG>PDA. Endoscopic vein harvest.   • CORONARY STENT PLACEMENT  07/08/2009    Coronary  artery stent placement (1) - obtuse marginal, Dr Sykes   • ENDOSCOPY  10/17/2003    EGD w/ tube 06666 (1)   • ENDOSCOPY AND COLONOSCOPY  05/06/2004    Colon endoscopy 27918 (1) - plus operative flex sig 2008, tics only         Family History   Problem Relation Age of Onset   • Hearing loss Mother    • Coronary artery disease Father    • Heart disease Father    • Other Father         CABG   • Macular degeneration Father    • Coronary artery disease Brother    • Heart disease Brother    • Other Brother         62 CABG  fundoplication   • Coronary artery disease Maternal Grandfather    • Heart disease Maternal Grandfather    • Sudden death Maternal Grandfather    • Diabetes Other    • Hearing loss Other    • Hypertension Other    • Skin cancer Other    • Stroke Other    • Vision loss Other    • Coronary artery disease Other          Social History     Social History   • Marital status:      Spouse name: N/A   • Number of children: N/A   • Years of education: N/A     Occupational History   • Not on file.     Social History Main Topics   • Smoking status: Never Smoker   • Smokeless tobacco: Never Used   • Alcohol use No   • Drug use: No   • Sexual activity: Defer      Comment:      Other Topics Concern   • Not on file     Social History Narrative   • No narrative on file         Current Outpatient Prescriptions   Medication Sig Dispense Refill   • clopidogrel (PLAVIX) 75 MG tablet TAKE ONE TABLET BY MOUTH EVERY DAY 90 tablet 2   • diphenhydrAMINE-acetaminophen (TYLENOL PM)  MG tablet per tablet Take 1 tablet by mouth At Night As Needed for sleep.     • Evolocumab 140 MG/ML solution auto-injector Inject 140 mg under the skin Every 14 (Fourteen) Days. 2 pen 6   • hydrALAZINE (APRESOLINE) 50 MG tablet Take 1 tablet by mouth 2 (Two) Times a Day. 90 tablet 6   • isosorbide mononitrate (IMDUR) 30 MG 24 hr tablet Take 1 tablet by mouth Daily. 30 tablet 11   • labetalol (NORMODYNE) 100 MG tablet Take 1  "tablet by mouth Every 12 (Twelve) Hours. 60 tablet 6   • Omega 3 1200 MG capsule Take 2 tablets by mouth Daily.     • omeprazole (priLOSEC) 20 MG capsule Take 20 mg by mouth Daily.     • sertraline (ZOLOFT) 100 MG tablet Take 100 mg by mouth Daily.       No current facility-administered medications for this visit.          Review of Systems     Constitution:  Fatigue, no fever or chills    HENT: Denies any headache, hearing impairment,     Eyes: Denies any blurring of vision, or photophobia     Cardivascular - As per history of present illness     Respiratory system- shortness of breath NYHA class II     Endocrine:   history of hyperlipidemia, diabetes,       Musculoskeletal:   history of arthritis with musculoskeletal problems    Gastrointestinal: No nausea, vomiting, or melena    Genitourinary: CKD stage III to stage IV    Neurological:   No history of seizure disorder, stroke, memory problems    Psychiatric/Behavioral:         history of depression,     Hematological- no history of easy bruising or any bleeding diathesis            OBJECTIVE    /80   Pulse 76   Ht 167.6 cm (66\")   Wt 88.5 kg (195 lb)   BMI 31.47 kg/m²       Physical Exam     Constitutional: is oriented to person, place, and time.     Skin-warm and dry    Well developed and nourished in no acute distress      Head: Normocephalic and atraumatic.     Eyes: Pupils are equal,    Neck: Neck supple. No bruit in the carotids    Cardiovascular: [place] in the fifth intercostal space   Regular rate, and  Rhythm,    S1 greater than S2, no S3 or S4, no gallop     Pulmonary/Chest:   Air  Entry is equal on both sides  No wheezing or crackles,      Abdominal: Soft.  No hepatosplenomegaly,    Musculoskeletal: No kyphoscoliosis, no significant thickening of the joints    Neurological: is alert and oriented to person, place, and time.    cranial nerve are intact .   No motor or sensory deficit    Extremities-no edema, no radial femoral " delay      Psychiatric: He has a normal mood and affect.                  His behavior is normal.             ECG 12 Lead  Date/Time: 5/29/2018 2:30 PM  Performed by: MARGARET TONG  Authorized by: MARGARET TONG   Comparison: not compared with previous ECG   Rhythm: sinus rhythm  Comments: Sinus rhythm with LVH                               A/P    Uncontrolled hypertension with angina-added isosorbide 30 mg daily, switched hydralazine to 50 mg 3 times a day from 2 times a day, continue labetalol.  Will get an echo to assess LV function and valvular function.    CAD status post CABG with two-vessel bypass, doing well on antiplatelet therapy with Plavix 75 mg daily , having chest pain started on isosorbide    Chronic kidney disease stage III-will refer to Dr. Medina    Hyperlipidemia cannot tolerate statins on red yeast Rice and will try to get PCSK9 inhibitors.    Follow-up in June as scheduled              This document has been electronically signed by Margaret Tong MD on May 29, 2018 2:30 PM       EMR Dragon/Transcription disclaimer:   Some of this note may be an electronic transcription/translation of spoken language to printed text. The electronic translation of spoken language may permit erroneous, or at times, nonsensical words or phrases to be inadvertently transcribed; Although I have reviewed the note for such errors, some may still exist.

## 2018-05-30 LAB
BH CV ECHO MEAS - ACS: 1.8 CM
BH CV ECHO MEAS - AO MAX PG (FULL): 4.6 MMHG
BH CV ECHO MEAS - AO MAX PG: 6.9 MMHG
BH CV ECHO MEAS - AO MEAN PG (FULL): 3 MMHG
BH CV ECHO MEAS - AO MEAN PG: 4 MMHG
BH CV ECHO MEAS - AO ROOT AREA (BSA CORRECTED): 1.6
BH CV ECHO MEAS - AO ROOT AREA: 8 CM^2
BH CV ECHO MEAS - AO ROOT DIAM: 3.2 CM
BH CV ECHO MEAS - AO V2 MAX: 131 CM/SEC
BH CV ECHO MEAS - AO V2 MEAN: 88.2 CM/SEC
BH CV ECHO MEAS - AO V2 VTI: 30.3 CM
BH CV ECHO MEAS - ASC AORTA: 2.8 CM
BH CV ECHO MEAS - AVA(I,A): 1.8 CM^2
BH CV ECHO MEAS - AVA(I,D): 1.8 CM^2
BH CV ECHO MEAS - AVA(V,A): 1.8 CM^2
BH CV ECHO MEAS - AVA(V,D): 1.8 CM^2
BH CV ECHO MEAS - BSA(HAYCOCK): 2.1 M^2
BH CV ECHO MEAS - BSA: 2 M^2
BH CV ECHO MEAS - BZI_BMI: 31.5 KILOGRAMS/M^2
BH CV ECHO MEAS - BZI_METRIC_HEIGHT: 167.6 CM
BH CV ECHO MEAS - BZI_METRIC_WEIGHT: 88.5 KG
BH CV ECHO MEAS - EDV(CUBED): 97.3 ML
BH CV ECHO MEAS - EDV(TEICH): 97.3 ML
BH CV ECHO MEAS - EF(CUBED): 52.1 %
BH CV ECHO MEAS - EF(TEICH): 44.1 %
BH CV ECHO MEAS - EPSS: 0.9 CM
BH CV ECHO MEAS - ESV(CUBED): 46.7 ML
BH CV ECHO MEAS - ESV(TEICH): 54.4 ML
BH CV ECHO MEAS - FS: 21.7 %
BH CV ECHO MEAS - IVS/LVPW: 1.3
BH CV ECHO MEAS - IVSD: 1.8 CM
BH CV ECHO MEAS - LA DIMENSION: 4.2 CM
BH CV ECHO MEAS - LA/AO: 1.3
BH CV ECHO MEAS - LV MASS(C)D: 314.4 GRAMS
BH CV ECHO MEAS - LV MASS(C)DI: 158.9 GRAMS/M^2
BH CV ECHO MEAS - LV MAX PG: 2.3 MMHG
BH CV ECHO MEAS - LV MEAN PG: 1 MMHG
BH CV ECHO MEAS - LV V1 MAX: 75.9 CM/SEC
BH CV ECHO MEAS - LV V1 MEAN: 51.2 CM/SEC
BH CV ECHO MEAS - LV V1 VTI: 17.7 CM
BH CV ECHO MEAS - LVIDD: 4.6 CM
BH CV ECHO MEAS - LVIDS: 3.6 CM
BH CV ECHO MEAS - LVOT AREA (M): 3.1 CM^2
BH CV ECHO MEAS - LVOT AREA: 3.1 CM^2
BH CV ECHO MEAS - LVOT DIAM: 2 CM
BH CV ECHO MEAS - LVPWD: 1.4 CM
BH CV ECHO MEAS - MR MAX PG: 117.9 MMHG
BH CV ECHO MEAS - MR MAX VEL: 543 CM/SEC
BH CV ECHO MEAS - MV A MAX VEL: 79 CM/SEC
BH CV ECHO MEAS - MV E MAX VEL: 103 CM/SEC
BH CV ECHO MEAS - MV E/A: 1.3
BH CV ECHO MEAS - PA MAX PG: 3.7 MMHG
BH CV ECHO MEAS - PA MEAN PG: 2 MMHG
BH CV ECHO MEAS - PA V2 MAX: 96.5 CM/SEC
BH CV ECHO MEAS - PA V2 MEAN: 67.4 CM/SEC
BH CV ECHO MEAS - PA V2 VTI: 22.7 CM
BH CV ECHO MEAS - PI END-D VEL: 130 CM/SEC
BH CV ECHO MEAS - RAP SYSTOLE: 5 MMHG
BH CV ECHO MEAS - RVDD: 3 CM
BH CV ECHO MEAS - RVSP: 27.3 MMHG
BH CV ECHO MEAS - SI(AO): 123.2 ML/M^2
BH CV ECHO MEAS - SI(CUBED): 25.6 ML/M^2
BH CV ECHO MEAS - SI(LVOT): 28.1 ML/M^2
BH CV ECHO MEAS - SI(TEICH): 21.7 ML/M^2
BH CV ECHO MEAS - SV(AO): 243.7 ML
BH CV ECHO MEAS - SV(CUBED): 50.7 ML
BH CV ECHO MEAS - SV(LVOT): 55.6 ML
BH CV ECHO MEAS - SV(TEICH): 42.9 ML
BH CV ECHO MEAS - TR MAX VEL: 236 CM/SEC
LV EF 2D ECHO EST: 51 %

## 2018-06-21 ENCOUNTER — OFFICE VISIT (OUTPATIENT)
Dept: CARDIOLOGY | Facility: CLINIC | Age: 68
End: 2018-06-21

## 2018-06-21 VITALS
HEIGHT: 66 IN | SYSTOLIC BLOOD PRESSURE: 148 MMHG | DIASTOLIC BLOOD PRESSURE: 70 MMHG | HEART RATE: 74 BPM | BODY MASS INDEX: 31.21 KG/M2 | WEIGHT: 194.2 LBS

## 2018-06-21 DIAGNOSIS — I10 ESSENTIAL HYPERTENSION: ICD-10-CM

## 2018-06-21 DIAGNOSIS — N18.30 STAGE 3 CHRONIC KIDNEY DISEASE (HCC): ICD-10-CM

## 2018-06-21 DIAGNOSIS — I25.810 CORONARY ARTERY DISEASE INVOLVING CORONARY BYPASS GRAFT OF NATIVE HEART WITHOUT ANGINA PECTORIS: Primary | ICD-10-CM

## 2018-06-21 DIAGNOSIS — E78.00 PURE HYPERCHOLESTEROLEMIA: ICD-10-CM

## 2018-06-21 DIAGNOSIS — I73.9 PERIPHERAL ARTERIAL DISEASE (HCC): ICD-10-CM

## 2018-06-21 PROCEDURE — 99214 OFFICE O/P EST MOD 30 MIN: CPT | Performed by: INTERNAL MEDICINE

## 2018-06-21 RX ORDER — HYDRALAZINE HYDROCHLORIDE 50 MG/1
50 TABLET, FILM COATED ORAL 3 TIMES DAILY
Qty: 270 TABLET | Refills: 6 | Status: SHIPPED | OUTPATIENT
Start: 2018-06-21 | End: 2020-08-19 | Stop reason: SDUPTHER

## 2018-06-21 NOTE — PROGRESS NOTES
[facility] Cardiology  OFFICE NOTE    Kenya Torres  68 y.o. male    06/21/2018  1. Coronary artery disease involving coronary bypass graft of native heart without angina pectoris    2. Peripheral arterial disease    3. Stage 3 chronic kidney disease    4. Essential hypertension    5. Pure hypercholesterolemia        Chief complaint - Follow-up for blood pressure      History of present [id number]-old gentleman with history of coronary disease S/P two vessel bypass with LIMA to LAD and vein graft to the right coronary artery,  he cannot be on any statins including Zetia due to intolerance and myalgia.  I will   try to get him on PCSK9 inhibitors.  .  His EKG showed sinus rhythm with LVH.  Only on isosorbide 30 mg daily,  hydralazine to 50 mg  3 times a day, and continue labetalol 100 twice a day.  His EF is okay and he was seen by nephrology and continue the current medications.      Allergies   Allergen Reactions   • Cefoxitin Anaphylaxis   • Crestor [Rosuvastatin Calcium] Other (See Comments)     Other allergic reaction   • Hydrocodone Itching   • Lipitor [Atorvastatin]    • Niacin And Related    • Pravastatin Other (See Comments)     Other allergic reaction   • Zetia [Ezetimibe] Myalgia   • Zocor [Simvastatin] Other (See Comments)     Other allergic reaction   • Lortab [Hydrocodone-Acetaminophen] Rash         Past Medical History:   Diagnosis Date   • CAD (coronary artery disease)    • Chest pain    • Contusion of trunk    • Coronary arteriosclerosis     2 V CAD post ptca X 3 (Cypher->RCA 2008 & Xience 2011; Xience->OM 7/2009)   • Coronary atherosclerosis of native coronary artery    • Diarrhea    • Diverticulitis of colon    • Essential hypertension    • Family history of malignant neoplasm of gastrointestinal tract    • Gastroesophageal reflux disease    • GERD (gastroesophageal reflux disease)    • History of echocardiogram 04/23/2015    Echocardiogram W/ color flow 05271 (1) -  Normal LV systolic function with Ef of 60-65%. LVH and diastolic abnormality of left ventricle.Moderate left atrial enlargement. Mild mitral and tricuspid regurgitation.Trace AV insufficiency.   • History of echocardiogram 12/27/2011    Echocardiogram 42177 (2) - Normal LV systolic function. EF 50%. Early diastolic dysfunction. Trace pulmonic regurgitation.   • History of EKG 05/21/2015    EKG Tracing and Interpretation 34851 (1) - ROBERT BURTON ([facility])   • Hyperlipidemia    • Hypertension    • Male erectile disorder     ? meds vs local pathology   • Presence of aortocoronary bypass graft    • Raised prostate specific antigen    • Surgical follow-up care     Surgical follow-up     • Upper respiratory infection     resolved   • Viral gastroenteritis          Past Surgical History:   Procedure Laterality Date   • APPENDECTOMY  07/22/2008   • CARDIAC CATHETERIZATION  04/23/2015    Cardiac cath 04004 (2) - Two vessel coronary artery disease with multiple tandem lesions with normal LV systolic function.   • CHOLECYSTECTOMY  05/28/2004    Cholecystectomy, laparoscopic (1)   • COLON SURGERY     • COLONOSCOPY  09/09/2014    Colonoscopy, diagnostic (screening) 93638- Prior resection of the sigmoid colon was found. Stool aspirated for study. Hemorrhoids found.   • COLOSTOMY  07/22/2008    Colostomy (1) - transverse for diverticulitis, closed 10/23/2008   • CORONARY ARTERY BYPASS GRAFT  05/05/2015    CABG (1) - CABG x 3. Placement of LIMA>LAD. SVG>PDA. Endoscopic vein harvest.   • CORONARY STENT PLACEMENT  07/08/2009    Coronary artery stent placement (1) - obtuse marginal, Dr Sykes   • ENDOSCOPY  10/17/2003    EGD w/ tube 15974 (1)   • ENDOSCOPY AND COLONOSCOPY  05/06/2004    Colon endoscopy 07352 (1) - plus operative flex sig 2008, tics only         Family History   Problem Relation Age of Onset   • Hearing loss Mother    • Coronary artery disease Father    • Heart disease Father    • Other Father         CABG    • Macular degeneration Father    • Coronary artery disease Brother    • Heart disease Brother    • Other Brother         62 CABG  fundoplication   • Coronary artery disease Maternal Grandfather    • Heart disease Maternal Grandfather    • Sudden death Maternal Grandfather    • Diabetes Other    • Hearing loss Other    • Hypertension Other    • Skin cancer Other    • Stroke Other    • Vision loss Other    • Coronary artery disease Other          Social History     Social History   • Marital status:      Spouse name: N/A   • Number of children: N/A   • Years of education: N/A     Occupational History   • Not on file.     Social History Main Topics   • Smoking status: Never Smoker   • Smokeless tobacco: Never Used   • Alcohol use No   • Drug use: No   • Sexual activity: Defer      Comment:      Other Topics Concern   • Not on file     Social History Narrative   • No narrative on file         Current Outpatient Prescriptions   Medication Sig Dispense Refill   • clopidogrel (PLAVIX) 75 MG tablet TAKE ONE TABLET BY MOUTH EVERY DAY 90 tablet 2   • diphenhydrAMINE-acetaminophen (TYLENOL PM)  MG tablet per tablet Take 1 tablet by mouth At Night As Needed for sleep.     • Evolocumab 140 MG/ML solution auto-injector Inject 140 mg under the skin Every 14 (Fourteen) Days. 2 pen 6   • hydrALAZINE (APRESOLINE) 50 MG tablet Take 1 tablet by mouth 3 (Three) Times a Day. 270 tablet 6   • isosorbide mononitrate (IMDUR) 30 MG 24 hr tablet Take 1 tablet by mouth Daily. 30 tablet 11   • labetalol (NORMODYNE) 100 MG tablet Take 1 tablet by mouth Every 12 (Twelve) Hours. 60 tablet 6   • Omega 3 1200 MG capsule Take 2 tablets by mouth Daily.     • omeprazole (priLOSEC) 20 MG capsule Take 20 mg by mouth Daily.     • sertraline (ZOLOFT) 100 MG tablet Take 100 mg by mouth Daily.       No current facility-administered medications for this visit.          Review of Systems     Constitution:  Fatigue, no fever or  "chills    HENT: Denies any headache, hearing impairment,     Eyes: Denies any blurring of vision, or photophobia     Cardivascular - As per history of present illness     Respiratory system- shortness of breath NYHA class II     Endocrine:   history of hyperlipidemia, diabetes,       Musculoskeletal:   history of arthritis with musculoskeletal problems    Gastrointestinal: No nausea, vomiting, or melena    Genitourinary: CKD stage III to stage IV    Neurological:   No history of seizure disorder, stroke, memory problems    Psychiatric/Behavioral:         history of depression,     Hematological- no history of easy bruising or any bleeding diathesis            OBJECTIVE    /70   Pulse 74   Ht 167.6 cm (65.98\")   Wt 88.1 kg (194 lb 3.2 oz)   BMI 31.36 kg/m²       Physical Exam     Constitutional: is oriented to person, place, and time.     Skin-warm and dry    Well developed and nourished in no acute distress      Head: Normocephalic and atraumatic.     Eyes: Pupils are equal,    Neck: Neck supple. No bruit in the carotids    Cardiovascular: [place] in the fifth intercostal space   Regular rate, and  Rhythm,    S1 greater than S2, no S3 or S4, no gallop     Pulmonary/Chest:   Air  Entry is equal on both sides  No wheezing or crackles,      Abdominal: Soft.  No hepatosplenomegaly,    Musculoskeletal: No kyphoscoliosis, no significant thickening of the joints    Neurological: is alert and oriented to person, place, and time.    cranial nerve are intact .   No motor or sensory deficit    Extremities-no edema, no radial femoral delay      Psychiatric: He has a normal mood and affect.                  His behavior is normal.           Procedures                       A/P    Uncontrolled hypertension on  isosorbide 30 mg daily, hydralazine to 50 mg 3 times a day, continue labetalol.  Echo was okay    CAD status post CABG with two-vessel bypass, doing well on antiplatelet therapy with Plavix 75 mg daily , having chest " pain started on isosorbide    Chronic kidney disease stage III-followed by nephrology    Hyperlipidemia cannot tolerate statins on red yeast [place] and will try to get PCSK9 inhibitors.    Follow-up in 6 months              This document has been electronically signed by Jesus Sykes MD on June 21, 2018 9:12 AM       EMR Dragon/Transcription disclaimer:   Some of this note may be an electronic transcription/translation of spoken language to printed text. The electronic translation of spoken language may permit erroneous, or at times, nonsensical words or phrases to be inadvertently transcribed; Although I have reviewed the note for such errors, some may still exist.

## 2018-08-20 RX ORDER — CLOPIDOGREL BISULFATE 75 MG/1
TABLET ORAL
Qty: 90 TABLET | Refills: 3 | Status: SHIPPED | OUTPATIENT
Start: 2018-08-20 | End: 2019-05-31 | Stop reason: SDUPTHER

## 2018-12-21 ENCOUNTER — OFFICE VISIT (OUTPATIENT)
Dept: CARDIOLOGY | Facility: CLINIC | Age: 68
End: 2018-12-21

## 2018-12-21 VITALS
HEIGHT: 66 IN | BODY MASS INDEX: 31.02 KG/M2 | WEIGHT: 193 LBS | DIASTOLIC BLOOD PRESSURE: 68 MMHG | HEART RATE: 68 BPM | SYSTOLIC BLOOD PRESSURE: 130 MMHG

## 2018-12-21 DIAGNOSIS — N18.30 STAGE 3 CHRONIC KIDNEY DISEASE (HCC): ICD-10-CM

## 2018-12-21 DIAGNOSIS — I10 ESSENTIAL HYPERTENSION: ICD-10-CM

## 2018-12-21 DIAGNOSIS — E78.00 PURE HYPERCHOLESTEROLEMIA: ICD-10-CM

## 2018-12-21 DIAGNOSIS — I73.9 PERIPHERAL ARTERIAL DISEASE (HCC): ICD-10-CM

## 2018-12-21 DIAGNOSIS — I25.810 CORONARY ARTERY DISEASE INVOLVING CORONARY BYPASS GRAFT OF NATIVE HEART WITHOUT ANGINA PECTORIS: Primary | ICD-10-CM

## 2018-12-21 PROCEDURE — 99214 OFFICE O/P EST MOD 30 MIN: CPT | Performed by: INTERNAL MEDICINE

## 2018-12-21 NOTE — PROGRESS NOTES
[facility] Cardiology  OFFICE NOTE    Kenya Torres  68 y.o. male    12/21/2018  1. Coronary artery disease involving coronary bypass graft of native heart without angina pectoris    2. Essential hypertension    3. Pure hypercholesterolemia    4. Stage 3 chronic kidney disease (CMS/HCC)    5. Peripheral arterial disease (CMS/HCC)        Chief complaint - Follow-up cad      History of present Uwnbosq-81-xzfw-old gentleman with history of coronary disease S/P two vessel bypass with LIMA to LAD and vein graft to the right coronary artery,  he cannot be on any statins including Zetia due to intolerance and myalgia.  He is on repatha and his cholesterol is very good.    His EKG showed sinus rhythm with LVH.  Or blood pressure he is  on isosorbide 30 mg daily,  hydralazine to 50 mg  3 times a day, and continue labetalol 100 twice a day.  His EF is okay and he was seen by nephrology and continue the current medications.          Allergies   Allergen Reactions   • Cefoxitin Anaphylaxis   • Crestor [Rosuvastatin Calcium] Other (See Comments)     Other allergic reaction   • Hydrocodone Itching   • Lipitor [Atorvastatin]    • Niacin And Related    • Pravastatin Other (See Comments)     Other allergic reaction   • Zetia [Ezetimibe] Myalgia   • Zocor [Simvastatin] Other (See Comments)     Other allergic reaction   • Lortab [Hydrocodone-Acetaminophen] Rash         Past Medical History:   Diagnosis Date   • CAD (coronary artery disease)    • Chest pain    • Contusion of trunk    • Coronary arteriosclerosis     2 V CAD post ptca X 3 (Cypher->RCA 2008 & Xience 2011; Xience->OM 7/2009)   • Coronary atherosclerosis of native coronary artery    • Diarrhea    • Diverticulitis of colon    • Essential hypertension    • Family history of malignant neoplasm of gastrointestinal tract    • Gastroesophageal reflux disease    • GERD (gastroesophageal reflux disease)    • History of echocardiogram 04/23/2015     Echocardiogram W/ color flow 02477 (1) - Normal LV systolic function with Ef of 60-65%. LVH and diastolic abnormality of left ventricle.Moderate left atrial enlargement. Mild mitral and tricuspid regurgitation.Trace AV insufficiency.   • History of echocardiogram 12/27/2011    Echocardiogram 03146 (2) - Normal LV systolic function. EF 50%. Early diastolic dysfunction. Trace pulmonic regurgitation.   • History of EKG 05/21/2015    EKG Tracing and Interpretation 21214 (1) - ROBERT BURTON ([facility])   • Hyperlipidemia    • Hypertension    • Male erectile disorder     ? meds vs local pathology   • Presence of aortocoronary bypass graft    • Raised prostate specific antigen    • Surgical follow-up care     Surgical follow-up     • Upper respiratory infection     resolved   • Viral gastroenteritis          Past Surgical History:   Procedure Laterality Date   • APPENDECTOMY  07/22/2008   • CARDIAC CATHETERIZATION  04/23/2015    Cardiac cath 47079 (2) - Two vessel coronary artery disease with multiple tandem lesions with normal LV systolic function.   • CHOLECYSTECTOMY  05/28/2004    Cholecystectomy, laparoscopic (1)   • COLON SURGERY     • COLONOSCOPY  09/09/2014    Colonoscopy, diagnostic (screening) 67022- Prior resection of the sigmoid colon was found. Stool aspirated for study. Hemorrhoids found.   • COLOSTOMY  07/22/2008    Colostomy (1) - transverse for diverticulitis, closed 10/23/2008   • CORONARY ARTERY BYPASS GRAFT  05/05/2015    CABG (1) - CABG x 3. Placement of LIMA>LAD. SVG>PDA. Endoscopic vein harvest.   • CORONARY STENT PLACEMENT  07/08/2009    Coronary artery stent placement (1) - obtuse marginal, Dr Sykes   • ENDOSCOPY  10/17/2003    EGD w/ tube 49358 (1)   • ENDOSCOPY AND COLONOSCOPY  05/06/2004    Colon endoscopy 41171 (1) - plus operative flex sig 2008, tics only         Family History   Problem Relation Age of Onset   • Hearing loss Mother    • Coronary artery disease Father    • Heart  disease Father    • Other Father         CABG   • Macular degeneration Father    • Coronary artery disease Brother    • Heart disease Brother    • Other Brother         62 CABG  fundoplication   • Coronary artery disease Maternal Grandfather    • Heart disease Maternal Grandfather    • Sudden death Maternal Grandfather    • Diabetes Other    • Hearing loss Other    • Hypertension Other    • Skin cancer Other    • Stroke Other    • Vision loss Other    • Coronary artery disease Other          Social History     Socioeconomic History   • Marital status:      Spouse name: Not on file   • Number of children: Not on file   • Years of education: Not on file   • Highest education level: Not on file   Social Needs   • Financial resource strain: Not on file   • Food insecurity - worry: Not on file   • Food insecurity - inability: Not on file   • Transportation needs - medical: Not on file   • Transportation needs - non-medical: Not on file   Occupational History   • Not on file   Tobacco Use   • Smoking status: Never Smoker   • Smokeless tobacco: Never Used   Substance and Sexual Activity   • Alcohol use: No   • Drug use: No   • Sexual activity: Defer     Comment:    Other Topics Concern   • Not on file   Social History Narrative   • Not on file         Current Outpatient Medications   Medication Sig Dispense Refill   • clopidogrel (PLAVIX) 75 MG tablet TAKE ONE TABLET BY MOUTH EVERY DAY 90 tablet 3   • diphenhydrAMINE-acetaminophen (TYLENOL PM)  MG tablet per tablet Take 1 tablet by mouth At Night As Needed for sleep.     • hydrALAZINE (APRESOLINE) 50 MG tablet Take 1 tablet by mouth 3 (Three) Times a Day. 270 tablet 6   • isosorbide mononitrate (IMDUR) 30 MG 24 hr tablet Take 1 tablet by mouth Daily. 30 tablet 11   • labetalol (NORMODYNE) 100 MG tablet Take 1 tablet by mouth Every 12 (Twelve) Hours. 60 tablet 6   • omeprazole (priLOSEC) 20 MG capsule Take 20 mg by mouth Daily.     • sertraline (ZOLOFT)  "100 MG tablet Take 100 mg by mouth Daily.       No current facility-administered medications for this visit.          Review of Systems     Constitution:  Fatigue, no fever or chills    HENT: Denies any headache, hearing impairment,     Eyes: Denies any blurring of vision, or photophobia     Cardivascular - As per history of present illness     Respiratory system- shortness of breath NYHA class II     Endocrine:   history of hyperlipidemia, diabetes,       Musculoskeletal:   history of arthritis with musculoskeletal problems    Gastrointestinal: No nausea, vomiting, or melena    Genitourinary: CKD stage III     Neurological:   No history of seizure disorder, stroke, memory problems    Psychiatric/Behavioral:         history of depression,     Hematological- no history of easy bruising or any bleeding diathesis            OBJECTIVE    /68   Pulse 68   Ht 167.6 cm (65.98\")   Wt 87.5 kg (193 lb)   BMI 31.17 kg/m²       Physical Exam     Constitutional: is oriented to person, place, and time.     Skin-warm and dry    Well developed and nourished in no acute distress      Head: Normocephalic and atraumatic.     Eyes: Pupils are equal,    Neck: Neck supple. No bruit in the carotids    Cardiovascular: [place] in the fifth intercostal space   Regular rate, and  Rhythm,    S1 greater than S2, no S3 or S4, no gallop     Pulmonary/Chest:   Air  Entry is equal on both sides  No wheezing or crackles,      Abdominal: Soft.  No hepatosplenomegaly,    Musculoskeletal: No kyphoscoliosis, no significant thickening of the joints    Neurological: is alert and oriented to person, place, and time.    cranial nerve are intact .   No motor or sensory deficit    Extremities-no edema, no radial femoral delay      Psychiatric: He has a normal mood and affect.                  His behavior is normal.           Procedures                       A/P     Hypertension on  isosorbide 30 mg daily, hydralazine to 50 mg 3 times a day, continue " labetalol.  Echo was okay.  Blood pressure is now better controlled    CAD status post CABG with two-vessel bypass, doing well on antiplatelet therapy with Plavix 75 mg daily ,     Chronic kidney disease stage III-followed by nephrology    Hyperlipidemia cannot tolerate statins on repatha and it is keeping his cholesterol under well controlled    Follow-up in 6 months              This document has been electronically signed by Jesus Sykes MD on December 21, 2018 10:37 AM       EMR Dragon/Transcription disclaimer:   Some of this note may be an electronic transcription/translation of spoken language to printed text. The electronic translation of spoken language may permit erroneous, or at times, nonsensical words or phrases to be inadvertently transcribed; Although I have reviewed the note for such errors, some may still exist.

## 2019-01-04 RX ORDER — LABETALOL 100 MG/1
TABLET, FILM COATED ORAL
Qty: 60 TABLET | Refills: 6 | Status: SHIPPED | OUTPATIENT
Start: 2019-01-04 | End: 2019-11-26 | Stop reason: SDUPTHER

## 2019-05-31 RX ORDER — CLOPIDOGREL BISULFATE 75 MG/1
TABLET ORAL
Qty: 90 TABLET | Refills: 3 | Status: SHIPPED | OUTPATIENT
Start: 2019-05-31

## 2019-08-07 DIAGNOSIS — I73.9 PAD (PERIPHERAL ARTERY DISEASE) (HCC): Primary | ICD-10-CM

## 2019-08-08 ENCOUNTER — OFFICE VISIT (OUTPATIENT)
Dept: CARDIOLOGY | Facility: CLINIC | Age: 69
End: 2019-08-08

## 2019-08-08 VITALS
HEIGHT: 66 IN | DIASTOLIC BLOOD PRESSURE: 78 MMHG | SYSTOLIC BLOOD PRESSURE: 142 MMHG | BODY MASS INDEX: 31.34 KG/M2 | HEART RATE: 61 BPM | WEIGHT: 195 LBS

## 2019-08-08 DIAGNOSIS — E78.00 PURE HYPERCHOLESTEROLEMIA: ICD-10-CM

## 2019-08-08 DIAGNOSIS — Z95.1 PRESENCE OF AORTOCORONARY BYPASS GRAFT: ICD-10-CM

## 2019-08-08 DIAGNOSIS — I25.10 ATHEROSCLEROSIS OF NATIVE CORONARY ARTERY OF NATIVE HEART WITHOUT ANGINA PECTORIS: Primary | ICD-10-CM

## 2019-08-08 DIAGNOSIS — I10 ESSENTIAL HYPERTENSION: ICD-10-CM

## 2019-08-08 PROCEDURE — 99214 OFFICE O/P EST MOD 30 MIN: CPT | Performed by: INTERNAL MEDICINE

## 2019-08-08 PROCEDURE — 93000 ELECTROCARDIOGRAM COMPLETE: CPT | Performed by: INTERNAL MEDICINE

## 2019-08-08 NOTE — PROGRESS NOTES
Psychiatric Cardiology  OFFICE NOTE    Kenya Torres  69 y.o. male    08/08/2019  1. Atherosclerosis of native coronary artery of native heart without angina pectoris    2. Presence of aortocoronary bypass graft    3. Essential hypertension    4. Pure hypercholesterolemia        Chief complaint - Follow-up cad      History of present [id number]-old gentleman with history of coronary disease S/P two vessel bypass with LIMA to LAD and vein graft to the right coronary artery,  he cannot be on any statins including Zetia due to intolerance and myalgia.  He was on repatha and his cholesterol is very good.  But he could not continue it due to increased cost.    his EKG showed sinus rhythm with LVH.  His  blood pressure is well controlled on isosorbide 30 mg daily,  hydralazine to 50 mg  3 times a day, and continue labetalol 100 twice a day.  His EF is okay and he was seen by nephrology for stage III chronic kidney disease and continue the current medications.  He can proceed with his surgery for the cyst on the hand with mild risk by ACC/ AHA guidelines.  Stop Plavix 4 to 5 days prior to surgery and restart when okay with the surgeon          Allergies   Allergen Reactions   • Cefoxitin Anaphylaxis   • Crestor [Rosuvastatin Calcium] Other (See Comments)     Other allergic reaction   • Hydrocodone Itching   • Lipitor [Atorvastatin]    • Niacin And Related    • Pravastatin Other (See Comments)     Other allergic reaction   • Zetia [Ezetimibe] Myalgia   • Zocor [Simvastatin] Other (See Comments)     Other allergic reaction   • Lortab [Hydrocodone-Acetaminophen] Rash         Past Medical History:   Diagnosis Date   • CAD (coronary artery disease)    • Chest pain    • Contusion of trunk    • Coronary arteriosclerosis     2 V CAD post ptca X 3 (Cypher->RCA 2008 & Xience 2011; Xience->OM 7/2009)   • Coronary atherosclerosis of native coronary artery    • Diarrhea    • Diverticulitis of colon    •  Essential hypertension    • Family history of malignant neoplasm of gastrointestinal tract    • Gastroesophageal reflux disease    • GERD (gastroesophageal reflux disease)    • History of echocardiogram 04/23/2015    Echocardiogram W/ color flow 31034 (1) - Normal LV systolic function with Ef of 60-65%. LVH and diastolic abnormality of left ventricle.Moderate left atrial enlargement. Mild mitral and tricuspid regurgitation.Trace AV insufficiency.   • History of echocardiogram 12/27/2011    Echocardiogram 93938 (2) - Normal LV systolic function. EF 50%. Early diastolic dysfunction. Trace pulmonic regurgitation.   • History of EKG 05/21/2015    EKG Tracing and Interpretation 67108 (1) - [facility]   • Hyperlipidemia    • Hypertension    • Male erectile disorder     ? meds vs local pathology   • Presence of aortocoronary bypass graft    • Raised prostate specific antigen    • Surgical follow-up care     Surgical follow-up     • Upper respiratory infection     resolved   • Viral gastroenteritis          Past Surgical History:   Procedure Laterality Date   • APPENDECTOMY  07/22/2008   • CARDIAC CATHETERIZATION  04/23/2015    Cardiac cath 71721 (2) - Two vessel coronary artery disease with multiple tandem lesions with normal LV systolic function.   • CHOLECYSTECTOMY  05/28/2004    Cholecystectomy, laparoscopic (1)   • COLON SURGERY     • COLONOSCOPY  09/09/2014    Colonoscopy, diagnostic (screening) 54136- Prior resection of the sigmoid colon was found. Stool aspirated for study. Hemorrhoids found.   • COLOSTOMY  07/22/2008    Colostomy (1) - transverse for diverticulitis, closed 10/23/2008   • CORONARY ARTERY BYPASS GRAFT  05/05/2015    CABG (1) - CABG x 3. Placement of LIMA>LAD. SVG>PDA. Endoscopic vein harvest.   • CORONARY STENT PLACEMENT  07/08/2009    Coronary artery stent placement (1) - obtuse marginal, Dr Sykes   • ENDOSCOPY  10/17/2003    EGD w/ tube 69739 (1)   • ENDOSCOPY AND  COLONOSCOPY  05/06/2004    Colon endoscopy 12962 (1) - plus operative flex sig 2008, tics only         Family History   Problem Relation Age of Onset   • Hearing loss Mother    • Coronary artery disease Father    • Heart disease Father    • Other Father         CABG   • Macular degeneration Father    • Coronary artery disease Brother    • Heart disease Brother    • Other Brother         62 CABG  fundoplication   • Coronary artery disease Maternal Grandfather    • Heart disease Maternal Grandfather    • Sudden death Maternal Grandfather    • Diabetes Other    • Hearing loss Other    • Hypertension Other    • Skin cancer Other    • Stroke Other    • Vision loss Other    • Coronary artery disease Other          Social History     Socioeconomic History   • Marital status:      Spouse name: Not on file   • Number of children: Not on file   • Years of education: Not on file   • Highest education level: Not on file   Tobacco Use   • Smoking status: Never Smoker   • Smokeless tobacco: Never Used   Substance and Sexual Activity   • Alcohol use: No   • Drug use: No   • Sexual activity: Defer     Comment:          Current Outpatient Medications   Medication Sig Dispense Refill   • clopidogrel (PLAVIX) 75 MG tablet TAKE ONE TABLET BY MOUTH EVERY DAY 90 tablet 3   • diphenhydrAMINE-acetaminophen (TYLENOL PM)  MG tablet per tablet Take 1 tablet by mouth At Night As Needed for sleep.     • hydrALAZINE (APRESOLINE) 50 MG tablet Take 1 tablet by mouth 3 (Three) Times a Day. 270 tablet 6   • isosorbide mononitrate (IMDUR) 30 MG 24 hr tablet Take 1 tablet by mouth Daily. 30 tablet 11   • labetalol (NORMODYNE) 100 MG tablet TAKE 1 TABLET BY MOUTH EVERY 12 HOURS. 60 tablet 6   • omeprazole (priLOSEC) 20 MG capsule Take 20 mg by mouth Daily.       No current facility-administered medications for this visit.          Review of Systems     Constitution:  Fatigue, no fever or chills    HENT: Denies any headache, hearing  "impairment,     Eyes: Denies any blurring of vision, or photophobia     Cardivascular - As per history of present illness     Respiratory system- shortness of breath NYHA class II     Endocrine:   history of hyperlipidemia, diabetes,       Musculoskeletal:   history of arthritis with musculoskeletal problems    Gastrointestinal: No nausea, vomiting, or melena    Genitourinary: CKD stage III     Neurological:   No history of seizure disorder, stroke, memory problems    Psychiatric/Behavioral:         history of depression,     Hematological- no history of easy bruising or any bleeding diathesis            OBJECTIVE    /78   Pulse 61   Ht 167.4 cm (65.9\")   Wt 88.5 kg (195 lb)   BMI 31.57 kg/m²       Physical Exam     Constitutional: is oriented to person, place, and time.     Skin-warm and dry    Well developed and nourished in no acute distress      Head: Normocephalic and atraumatic.     Eyes: Pupils are equal,    Neck: Neck supple. No bruit in the carotids    Cardiovascular: [place] in the fifth intercostal space   Regular rate, and  Rhythm,    S1 greater than S2, no S3 or S4, no gallop     Pulmonary/Chest:   Air  Entry is equal on both sides  No wheezing or crackles,      Abdominal: Soft.  No hepatosplenomegaly,    Musculoskeletal: No kyphoscoliosis, no significant thickening of the joints    Neurological: is alert and oriented to person, place, and time.    cranial nerve are intact .   No motor or sensory deficit    Extremities-no edema, no radial femoral delay      Psychiatric: He has a normal mood and affect.                  His behavior is normal.           Procedures                       A/P  Hypertension on  isosorbide 30 mg daily, hydralazine to 50 mg 3 times a day, continue labetalol.  Echo was okay.  Blood pressure is . controlled    CAD status post CABG with two-vessel bypass, doing well on antiplatelet therapy with Plavix 75 mg daily , can proceed with hand surgery with mild risk    Chronic " kidney disease stage III-followed by nephrology    Hyperlipidemia cannot tolerate statins  Was on repatha and it is keeping his cholesterol under well controlled and he stopped it due to the cost    Follow-up in 6 months with Dr. Mart              This document has been electronically signed by Jesus Sykes MD on August 8, 2019 8:53 AM       EMR Dragon/Transcription disclaimer:   Some of this note may be an electronic transcription/translation of spoken language to printed text. The electronic translation of spoken language may permit erroneous, or at times, nonsensical words or phrases to be inadvertently transcribed; Although I have reviewed the note for such errors, some may still exist.

## 2019-08-09 ENCOUNTER — TRANSCRIBE ORDERS (OUTPATIENT)
Dept: ORTHOPEDIC SURGERY | Facility: CLINIC | Age: 69
End: 2019-08-09

## 2019-08-09 DIAGNOSIS — M67.40 GANGLION CYST: Primary | ICD-10-CM

## 2019-08-15 ENCOUNTER — OFFICE VISIT (OUTPATIENT)
Dept: ORTHOPEDIC SURGERY | Facility: CLINIC | Age: 69
End: 2019-08-15

## 2019-08-15 VITALS — BODY MASS INDEX: 30.97 KG/M2 | HEIGHT: 66 IN | WEIGHT: 192.7 LBS

## 2019-08-15 DIAGNOSIS — R20.0 NUMBNESS AND TINGLING IN RIGHT HAND: ICD-10-CM

## 2019-08-15 DIAGNOSIS — R20.2 NUMBNESS AND TINGLING IN RIGHT HAND: ICD-10-CM

## 2019-08-15 DIAGNOSIS — I10 ESSENTIAL HYPERTENSION: ICD-10-CM

## 2019-08-15 DIAGNOSIS — I73.9 PERIPHERAL ARTERIAL DISEASE (HCC): ICD-10-CM

## 2019-08-15 DIAGNOSIS — M25.531 RIGHT WRIST PAIN: ICD-10-CM

## 2019-08-15 DIAGNOSIS — M25.831 MASS OF JOINT OF RIGHT WRIST: Primary | ICD-10-CM

## 2019-08-15 PROCEDURE — 99203 OFFICE O/P NEW LOW 30 MIN: CPT | Performed by: ORTHOPAEDIC SURGERY

## 2019-08-15 RX ORDER — HYDRALAZINE HYDROCHLORIDE 10 MG/1
10 TABLET, FILM COATED ORAL 3 TIMES DAILY
Status: ON HOLD | COMMUNITY
End: 2019-12-20

## 2019-08-15 NOTE — PROGRESS NOTES
Kenya Torres is a 69 y.o. male   Primary provider:  Nickolas Fox MD       Chief Complaint   Patient presents with   • Right Wrist - Pain       HISTORY OF PRESENT ILLNESS:    New patient right wrist pain, patient had XRAY done on 7/22/19 at [facility], patient states pain score is at a 5 today,   Noticed mass in right hand about 2-3 weeks ago.  No specific injury  Tingling in thumb and index fingers.  No numbness/tingling/burning at night.  Occasional sharp pains.      Pain   This is a new problem. The current episode started 1 to 4 weeks ago. The problem occurs constantly. Pertinent negatives include no chills or fever. Associated symptoms comments: Aching . Nothing aggravates the symptoms. He has tried NSAIDs for the symptoms. The treatment provided no relief.        CONCURRENT MEDICAL HISTORY:    Past Medical History:   Diagnosis Date   • CAD (coronary artery disease)    • Chest pain    • Contusion of trunk    • Coronary arteriosclerosis     2 V CAD post ptca X 3 (Cypher->RCA 2008 & Xience 2011; Xience->OM 7/2009)   • Coronary atherosclerosis of native coronary artery    • Diarrhea    • Diverticulitis of colon    • Essential hypertension    • Family history of malignant neoplasm of gastrointestinal tract    • Gastroesophageal reflux disease    • GERD (gastroesophageal reflux disease)    • History of echocardiogram 04/23/2015    Echocardiogram W/ color flow 95702 (1) - Normal LV systolic function with Ef of 60-65%. LVH and diastolic abnormality of left ventricle.Moderate left atrial enlargement. Mild mitral and tricuspid regurgitation.Trace AV insufficiency.   • History of echocardiogram 12/27/2011    Echocardiogram 67797 (2) - Normal LV systolic function. EF 50%. Early diastolic dysfunction. Trace pulmonic regurgitation.   • History of EKG 05/21/2015    EKG Tracing and Interpretation 45143 (1) - [facility] ([facility])   • Hyperlipidemia    • Hypertension    • Male erectile disorder     ? meds  vs local pathology   • Presence of aortocoronary bypass graft    • Raised prostate specific antigen    • Surgical follow-up care     Surgical follow-up     • Upper respiratory infection     resolved   • Viral gastroenteritis        Allergies   Allergen Reactions   • Cefoxitin Anaphylaxis   • Crestor [Rosuvastatin Calcium] Other (See Comments)     Other allergic reaction   • Hydrocodone Itching   • Lipitor [Atorvastatin]    • Niacin And Related    • Pravastatin Other (See Comments)     Other allergic reaction   • Zetia [Ezetimibe] Myalgia   • Zocor [Simvastatin] Other (See Comments)     Other allergic reaction   • Lortab [Hydrocodone-Acetaminophen] Rash         Current Outpatient Medications:   •  clopidogrel (PLAVIX) 75 MG tablet, TAKE ONE TABLET BY MOUTH EVERY DAY, Disp: 90 tablet, Rfl: 3  •  diphenhydrAMINE-acetaminophen (TYLENOL PM)  MG tablet per tablet, Take 1 tablet by mouth At Night As Needed for sleep., Disp: , Rfl:   •  hydrALAZINE (APRESOLINE) 10 MG tablet, Take 10 mg by mouth 3 (Three) Times a Day., Disp: , Rfl:   •  isosorbide mononitrate (IMDUR) 30 MG 24 hr tablet, Take 1 tablet by mouth Daily., Disp: 30 tablet, Rfl: 11  •  labetalol (NORMODYNE) 100 MG tablet, TAKE 1 TABLET BY MOUTH EVERY 12 HOURS., Disp: 60 tablet, Rfl: 6  •  omeprazole (priLOSEC) 20 MG capsule, Take 20 mg by mouth Daily., Disp: , Rfl:   •  hydrALAZINE (APRESOLINE) 50 MG tablet, Take 1 tablet by mouth 3 (Three) Times a Day., Disp: 270 tablet, Rfl: 6    Past Surgical History:   Procedure Laterality Date   • APPENDECTOMY  07/22/2008   • CARDIAC CATHETERIZATION  04/23/2015    Cardiac cath 93961 (2) - Two vessel coronary artery disease with multiple tandem lesions with normal LV systolic function.   • CHOLECYSTECTOMY  05/28/2004    Cholecystectomy, laparoscopic (1)   • COLON SURGERY     • COLONOSCOPY  09/09/2014    Colonoscopy, diagnostic (screening) 59544- Prior resection of the sigmoid colon was found. Stool aspirated for study.  "Hemorrhoids found.   • COLOSTOMY  07/22/2008    Colostomy (1) - transverse for diverticulitis, closed 10/23/2008   • CORONARY ARTERY BYPASS GRAFT  05/05/2015    CABG (1) - CABG x 3. Placement of LIMA>LAD. SVG>PDA. Endoscopic vein harvest.   • CORONARY STENT PLACEMENT  07/08/2009    Coronary artery stent placement (1) - obtuse marginal, Dr Sykes   • ENDOSCOPY  10/17/2003    EGD w/ tube 54904 (1)   • ENDOSCOPY AND COLONOSCOPY  05/06/2004    Colon endoscopy 67838 (1) - plus operative flex sig 2008, tics only       Family History   Problem Relation Age of Onset   • Hearing loss Mother    • Coronary artery disease Father    • Heart disease Father    • Other Father         CABG   • Macular degeneration Father    • Coronary artery disease Brother    • Heart disease Brother    • Other Brother         62 CABG  fundoplication   • Coronary artery disease Maternal Grandfather    • Heart disease Maternal Grandfather    • Sudden death Maternal Grandfather    • Diabetes Other    • Hearing loss Other    • Hypertension Other    • Skin cancer Other    • Stroke Other    • Vision loss Other    • Coronary artery disease Other         Social History     Socioeconomic History   • Marital status:      Spouse name: Not on file   • Number of children: Not on file   • Years of education: Not on file   • Highest education level: Not on file   Tobacco Use   • Smoking status: Never Smoker   • Smokeless tobacco: Never Used   Substance and Sexual Activity   • Alcohol use: No   • Drug use: No   • Sexual activity: Defer     Comment:         Review of Systems   Constitutional: Negative for chills and fever.   Musculoskeletal:        Right wrist pain   Hematological: Bruises/bleeds easily.   All other systems reviewed and are negative.      PHYSICAL EXAMINATION:       Ht 167.4 cm (65.9\")   Wt 87.4 kg (192 lb 11.2 oz)   BMI 31.20 kg/m²     Physical Exam   Constitutional: He is oriented to person, place, and time. He appears " well-developed and well-nourished.   Neurological: He is alert and oriented to person, place, and time.   Skin: Capillary refill takes less than 2 seconds.   Psychiatric: He has a normal mood and affect. His behavior is normal. Judgment and thought content normal.       GAIT:     [x]  Normal  []  Antalgic    Assistive device: [x]  None  []  Walker     []  Crutches  []  Cane     []  Wheelchair  []  Stretcher    Right Hand Exam     Comments:  Good motion of the right hand.  Good distal pulses and sensation.  No swelling.  There is a mild to moderate sized mass on the volar aspect of the wrist on the radial side.  The radial pulses felt on either side of the mass.  Stable joint exam.  Good strength.      Left Hand Exam     Tenderness   The patient is experiencing no tenderness.     Range of Motion   The patient has normal left wrist ROM.    Muscle Strength   The patient has normal left wrist strength.    Tests   Phalen’s Sign: negative  Tinel's sign (median nerve): negative    Other   Erythema: absent  Sensation: normal  Pulse: present                X-ray wrist right PA lateral and oblique7/22/2019  [facility]  Result Impression      Negative.  The soft tissue nodule would be better evaluated with MRI.    [id number]   Result Narrative   Indication:  Acute right wrist pain, painful nodule posterior radial aspect of wrist.    Right Wrist, Three Views:  The wrist is normally aligned and no fracture.  The articular surfaces are smooth.  No periarticular soft tissue nodule or calcification.   Other Result Information   Lobo, Rad Results In - 07/22/2019  9:25 AM CDT  Indication:  Acute right wrist pain, painful nodule posterior radial aspect of wrist.    Right Wrist, Three Views:  The wrist is normally aligned and no fracture.  The articular surfaces are smooth.  No periarticular soft tissue nodule or calcification.    IMPRESSION:      Negative.  The soft tissue nodule would be better evaluated with MRI.            ASSESSMENT:    Diagnoses and all orders for this visit:    Mass of joint of right wrist    Right wrist pain    Numbness and tingling in right hand    Peripheral arterial disease (CMS/HCC)    Essential hypertension    Other orders  -     hydrALAZINE (APRESOLINE) 10 MG tablet; Take 10 mg by mouth 3 (Three) Times a Day.          PLAN     We discussed use of thumb spica brace for the next 4 weeks with limited use of his right wrist.  If his symptoms do not improve then we discussed MRI with the possibility of excision of a volar ganglion cyst.  However, if he does have improvement then we discussed slowly progressing as tolerated and continued use the brace for little while longer to manage improvement.    Patient's Body mass index is 31.2 kg/m². BMI is above normal parameters. Recommendations include: exercise counseling and referral to a nutritionist.      Return in about 4 weeks (around 9/12/2019) for recheck.    Jeffrey Smyth MD

## 2019-09-12 ENCOUNTER — OFFICE VISIT (OUTPATIENT)
Dept: ORTHOPEDIC SURGERY | Facility: CLINIC | Age: 69
End: 2019-09-12

## 2019-09-12 VITALS — WEIGHT: 194 LBS | BODY MASS INDEX: 31.18 KG/M2 | HEIGHT: 66 IN

## 2019-09-12 DIAGNOSIS — M25.831 MASS OF JOINT OF RIGHT WRIST: Primary | ICD-10-CM

## 2019-09-12 DIAGNOSIS — R20.2 NUMBNESS AND TINGLING IN RIGHT HAND: ICD-10-CM

## 2019-09-12 DIAGNOSIS — R20.0 NUMBNESS AND TINGLING IN RIGHT HAND: ICD-10-CM

## 2019-09-12 DIAGNOSIS — M25.531 RIGHT WRIST PAIN: ICD-10-CM

## 2019-09-12 PROCEDURE — 99213 OFFICE O/P EST LOW 20 MIN: CPT | Performed by: ORTHOPAEDIC SURGERY

## 2019-09-12 NOTE — PROGRESS NOTES
"Kenya Torres is a 69 y.o. male returns for     Chief Complaint   Patient presents with   • Right Wrist - Follow-up       HISTORY OF PRESENT ILLNESS: Patient being seen for right wrist follow up. Currently using thumb spica splint. Pain increases with movement. Reports no improvement since last visit.   No real improvement with the brace.  He continues to be activity as tolerated.  Intermittent numbness and tingling.     CONCURRENT MEDICAL HISTORY:    The following portions of the patient's history were reviewed and updated as appropriate: allergies, current medications, past family history, past medical history, past social history, past surgical history and problem list.     ROS  No fevers or chills.  No chest pain or shortness of air.  No GI or  disturbances.    PHYSICAL EXAMINATION:       Ht 167.4 cm (65.9\")   Wt 88 kg (194 lb)   BMI 31.41 kg/m²     Physical Exam   Constitutional: He is oriented to person, place, and time. He appears well-developed and well-nourished.   Neurological: He is alert and oriented to person, place, and time.   Psychiatric: He has a normal mood and affect. His behavior is normal. Judgment and thought content normal.       GAIT:     [x]  Normal  []  Antalgic    Assistive device: [x]  None  []  Walker     []  Crutches  []  Cane     []  Wheelchair  []  Stretcher    Right Hand Exam     Comments:  Good motion of the right hand.  Good distal pulses and sensation.  No swelling.  There is a mild to moderate sized mass on the volar aspect of the wrist on the radial side.  The radial pulses felt on either side of the mass.  Stable joint exam.  Good strength.  Edmund's test shows that he is almost completely radial dominant as far as hand blood supply.                X-ray wrist right PA lateral and oblique7/22/2019  [facility]  Result Impression      Negative.  The soft tissue nodule would be better evaluated with MRI.    [id number]   Result Narrative   Indication:  Acute right " wrist pain, painful nodule posterior radial aspect of wrist.    Right Wrist, Three Views:  The wrist is normally aligned and no fracture.  The articular surfaces are smooth.  No periarticular soft tissue nodule or calcification.   Other Result Information   Lobo, Rad Results In - 07/22/2019  9:25 AM CDT  Indication:  Acute right wrist pain, painful nodule posterior radial aspect of wrist.    Right Wrist, Three Views:  The wrist is normally aligned and no fracture.  The articular surfaces are smooth.  No periarticular soft tissue nodule or calcification.    IMPRESSION:      Negative.  The soft tissue nodule would be better evaluated with MRI.    [id number]             ASSESSMENT:    Diagnoses and all orders for this visit:    Mass of joint of right wrist    Right wrist pain    Numbness and tingling in right hand          PLAN    Continue with activity as tolerated.  We discussed extreme risk with surgical intervention due to lack of ulnar blood supply to the hand.  He will continue with splint as tolerated.  Activity as tolerated.  follow-up as needed to discuss further treatment options if necessary.    Patient's Body mass index is 31.41 kg/m². BMI is above normal parameters. Recommendations include: exercise counseling and nutrition counseling.      Return if symptoms worsen or fail to improve, for recheck.    Jeffrey Smyth MD

## 2019-11-26 RX ORDER — LABETALOL 100 MG/1
TABLET, FILM COATED ORAL
Qty: 60 TABLET | Refills: 6 | Status: SHIPPED | OUTPATIENT
Start: 2019-11-26 | End: 2019-12-02 | Stop reason: SDUPTHER

## 2019-12-02 RX ORDER — LABETALOL 100 MG/1
100 TABLET, FILM COATED ORAL EVERY 12 HOURS
Qty: 180 TABLET | Refills: 3 | Status: SHIPPED | OUTPATIENT
Start: 2019-12-02 | End: 2020-03-10

## 2019-12-16 ENCOUNTER — DOCUMENTATION (OUTPATIENT)
Dept: CARDIOLOGY | Facility: CLINIC | Age: 69
End: 2019-12-16

## 2019-12-16 NOTE — PROGRESS NOTES
Patient called with the CC of hypertension running about at 220/100 with chest pains. Patient states he had to take a Nitro. I advised patient that it would be best for the patient to go to the Er. The patient voiced understanding.

## 2019-12-19 ENCOUNTER — TELEPHONE (OUTPATIENT)
Dept: CARDIOLOGY | Facility: CLINIC | Age: 69
End: 2019-12-19

## 2019-12-19 NOTE — TELEPHONE ENCOUNTER
Jerri Morrison Karen M, RN             This patients wife Deidra has called asking for a return call to discuss Mr. Torres's B/P.  She took him to urgent care today, but it is still uncontrolled.  He had an appt on 2/11/20 but insisted on coming in sooner, so I rescheduled it to 1/8/20 with Dr. Mart.  If this is unacceptable let me know.   Her # is [phone].   Thank you      Called Deidra to discuss above. She is concerned with patients BP as it has been running high in the 200s systolic with some episodes of chest pain. She has taken him to urgent care and they have increased some of his meds and that his bp has come down to 160s systlic. I let her know that Dr García is out of office and will not return until the 26th, but that someone would discuss with Dr Mart to see what other adjustments to meds could be done if any, or to see if the patient should be seen sooner. I let her know if the patients BP got up again and especially with chest pain he should present to the ER. She stated he would refuse but she verbalized understanding.

## 2019-12-20 ENCOUNTER — HOSPITAL ENCOUNTER (INPATIENT)
Facility: HOSPITAL | Age: 69
LOS: 1 days | Discharge: HOME | End: 2019-12-22
Attending: INTERNAL MEDICINE | Admitting: INTERNAL MEDICINE

## 2019-12-20 DIAGNOSIS — I25.118 CORONARY ARTERY DISEASE OF NATIVE HEART WITH STABLE ANGINA PECTORIS, UNSPECIFIED VESSEL OR LESION TYPE (HCC): Chronic | ICD-10-CM

## 2019-12-20 DIAGNOSIS — I21.4 NSTEMI, INITIAL EPISODE OF CARE (HCC): Primary | ICD-10-CM

## 2019-12-20 PROBLEM — R07.9 CHEST PAIN: Chronic | Status: ACTIVE | Noted: 2019-12-20

## 2019-12-20 PROBLEM — I20.9 ANGINA PECTORIS: Chronic | Status: ACTIVE | Noted: 2019-12-20

## 2019-12-20 LAB
APTT PPP: 30 SECONDS (ref 20–40.3)
BASOPHILS # BLD AUTO: 0.03 10*3/MM3 (ref 0–0.2)
BASOPHILS NFR BLD AUTO: 0.4 % (ref 0–1.5)
DEPRECATED RDW RBC AUTO: 46.2 FL (ref 37–54)
EOSINOPHIL # BLD AUTO: 0.09 10*3/MM3 (ref 0–0.4)
EOSINOPHIL NFR BLD AUTO: 1.3 % (ref 0.3–6.2)
ERYTHROCYTE [DISTWIDTH] IN BLOOD BY AUTOMATED COUNT: 13.4 % (ref 12.3–15.4)
HCT VFR BLD AUTO: 33.3 % (ref 37.5–51)
HGB BLD-MCNC: 11.4 G/DL (ref 13–17.7)
IMM GRANULOCYTES # BLD AUTO: 0.01 10*3/MM3 (ref 0–0.05)
IMM GRANULOCYTES NFR BLD AUTO: 0.1 % (ref 0–0.5)
INR PPP: 1.07 (ref 0.8–1.2)
LYMPHOCYTES # BLD AUTO: 1.28 10*3/MM3 (ref 0.7–3.1)
LYMPHOCYTES NFR BLD AUTO: 18.1 % (ref 19.6–45.3)
MCH RBC QN AUTO: 32.1 PG (ref 26.6–33)
MCHC RBC AUTO-ENTMCNC: 34.2 G/DL (ref 31.5–35.7)
MCV RBC AUTO: 93.8 FL (ref 79–97)
MONOCYTES # BLD AUTO: 0.72 10*3/MM3 (ref 0.1–0.9)
MONOCYTES NFR BLD AUTO: 10.2 % (ref 5–12)
NEUTROPHILS # BLD AUTO: 4.95 10*3/MM3 (ref 1.7–7)
NEUTROPHILS NFR BLD AUTO: 69.9 % (ref 42.7–76)
NRBC BLD AUTO-RTO: 0 /100 WBC (ref 0–0.2)
PLATELET # BLD AUTO: 207 10*3/MM3 (ref 140–450)
PMV BLD AUTO: 9.7 FL (ref 6–12)
PROTHROMBIN TIME: 13.7 SECONDS (ref 11.1–15.3)
RBC # BLD AUTO: 3.55 10*6/MM3 (ref 4.14–5.8)
TROPONIN T SERPL-MCNC: 0.17 NG/ML (ref 0–0.03)
TROPONIN T SERPL-MCNC: 0.17 NG/ML (ref 0–0.03)
WBC NRBC COR # BLD: 7.08 10*3/MM3 (ref 3.4–10.8)

## 2019-12-20 PROCEDURE — 85025 COMPLETE CBC W/AUTO DIFF WBC: CPT | Performed by: INTERNAL MEDICINE

## 2019-12-20 PROCEDURE — 99214 OFFICE O/P EST MOD 30 MIN: CPT | Performed by: INTERNAL MEDICINE

## 2019-12-20 PROCEDURE — 85730 THROMBOPLASTIN TIME PARTIAL: CPT | Performed by: INTERNAL MEDICINE

## 2019-12-20 PROCEDURE — G0378 HOSPITAL OBSERVATION PER HR: HCPCS

## 2019-12-20 PROCEDURE — 63710000001 DIPHENHYDRAMINE PER 50 MG: Performed by: INTERNAL MEDICINE

## 2019-12-20 PROCEDURE — 25010000002 HEPARIN (PORCINE) PER 1000 UNITS: Performed by: INTERNAL MEDICINE

## 2019-12-20 PROCEDURE — 85610 PROTHROMBIN TIME: CPT | Performed by: INTERNAL MEDICINE

## 2019-12-20 PROCEDURE — 93010 ELECTROCARDIOGRAM REPORT: CPT | Performed by: INTERNAL MEDICINE

## 2019-12-20 PROCEDURE — 84484 ASSAY OF TROPONIN QUANT: CPT | Performed by: INTERNAL MEDICINE

## 2019-12-20 PROCEDURE — 93005 ELECTROCARDIOGRAM TRACING: CPT | Performed by: INTERNAL MEDICINE

## 2019-12-20 RX ORDER — CLOPIDOGREL BISULFATE 75 MG/1
75 TABLET ORAL DAILY
Status: DISCONTINUED | OUTPATIENT
Start: 2019-12-21 | End: 2019-12-22 | Stop reason: HOSPADM

## 2019-12-20 RX ORDER — ISOSORBIDE MONONITRATE 60 MG/1
60 TABLET, EXTENDED RELEASE ORAL
Status: DISCONTINUED | OUTPATIENT
Start: 2019-12-20 | End: 2019-12-21

## 2019-12-20 RX ORDER — HEPARIN SODIUM 5000 [USP'U]/ML
4000 INJECTION, SOLUTION INTRAVENOUS; SUBCUTANEOUS AS NEEDED
Status: DISCONTINUED | OUTPATIENT
Start: 2019-12-20 | End: 2019-12-21

## 2019-12-20 RX ORDER — DIPHENHYDRAMINE HCL 25 MG
25 CAPSULE ORAL NIGHTLY PRN
Status: DISCONTINUED | OUTPATIENT
Start: 2019-12-20 | End: 2019-12-22 | Stop reason: HOSPADM

## 2019-12-20 RX ORDER — ACETAMINOPHEN 325 MG/1
650 TABLET ORAL EVERY 6 HOURS PRN
Status: DISCONTINUED | OUTPATIENT
Start: 2019-12-20 | End: 2019-12-22 | Stop reason: HOSPADM

## 2019-12-20 RX ORDER — SERTRALINE HYDROCHLORIDE 100 MG/1
100 TABLET, FILM COATED ORAL DAILY
COMMUNITY

## 2019-12-20 RX ORDER — HEPARIN SODIUM 5000 [USP'U]/ML
30 INJECTION, SOLUTION INTRAVENOUS; SUBCUTANEOUS AS NEEDED
Status: DISCONTINUED | OUTPATIENT
Start: 2019-12-20 | End: 2019-12-21

## 2019-12-20 RX ORDER — SODIUM CHLORIDE 450 MG/100ML
75 INJECTION, SOLUTION INTRAVENOUS CONTINUOUS
Status: DISCONTINUED | OUTPATIENT
Start: 2019-12-20 | End: 2019-12-21

## 2019-12-20 RX ORDER — SODIUM CHLORIDE 0.9 % (FLUSH) 0.9 %
10 SYRINGE (ML) INJECTION AS NEEDED
Status: DISCONTINUED | OUTPATIENT
Start: 2019-12-20 | End: 2019-12-22 | Stop reason: HOSPADM

## 2019-12-20 RX ORDER — SODIUM CHLORIDE 0.9 % (FLUSH) 0.9 %
10 SYRINGE (ML) INJECTION EVERY 12 HOURS SCHEDULED
Status: DISCONTINUED | OUTPATIENT
Start: 2019-12-20 | End: 2019-12-22 | Stop reason: HOSPADM

## 2019-12-20 RX ORDER — HEPARIN SODIUM 10000 [USP'U]/100ML
13.4 INJECTION, SOLUTION INTRAVENOUS
Status: DISCONTINUED | OUTPATIENT
Start: 2019-12-20 | End: 2019-12-21

## 2019-12-20 RX ORDER — LABETALOL 200 MG/1
200 TABLET, FILM COATED ORAL EVERY 12 HOURS
Status: DISCONTINUED | OUTPATIENT
Start: 2019-12-20 | End: 2019-12-22 | Stop reason: HOSPADM

## 2019-12-20 RX ORDER — HYDRALAZINE HYDROCHLORIDE 50 MG/1
50 TABLET, FILM COATED ORAL 3 TIMES DAILY
Status: DISCONTINUED | OUTPATIENT
Start: 2019-12-20 | End: 2019-12-22 | Stop reason: HOSPADM

## 2019-12-20 RX ADMIN — DIPHENHYDRAMINE HYDROCHLORIDE 25 MG: 25 CAPSULE ORAL at 21:38

## 2019-12-20 RX ADMIN — HYDRALAZINE HYDROCHLORIDE 50 MG: 50 TABLET ORAL at 16:20

## 2019-12-20 RX ADMIN — ISOSORBIDE MONONITRATE 60 MG: 60 TABLET, EXTENDED RELEASE ORAL at 16:20

## 2019-12-20 RX ADMIN — ACETAMINOPHEN 650 MG: 325 TABLET, FILM COATED ORAL at 21:42

## 2019-12-20 RX ADMIN — SODIUM CHLORIDE 75 ML/HR: 4.5 INJECTION, SOLUTION INTRAVENOUS at 18:36

## 2019-12-20 RX ADMIN — LABETALOL HYDROCHLORIDE 200 MG: 200 TABLET, FILM COATED ORAL at 16:20

## 2019-12-20 RX ADMIN — HYDRALAZINE HYDROCHLORIDE 50 MG: 50 TABLET ORAL at 21:38

## 2019-12-20 RX ADMIN — HEPARIN SODIUM 11.4 UNITS/KG/HR: 10000 INJECTION, SOLUTION INTRAVENOUS at 19:58

## 2019-12-21 ENCOUNTER — APPOINTMENT (OUTPATIENT)
Dept: CARDIOLOGY | Facility: HOSPITAL | Age: 69
End: 2019-12-21

## 2019-12-21 PROBLEM — R07.9 CHEST PAIN: Status: ACTIVE | Noted: 2019-12-21

## 2019-12-21 PROBLEM — I21.4 NSTEMI, INITIAL EPISODE OF CARE (HCC): Status: ACTIVE | Noted: 2019-12-20

## 2019-12-21 LAB
ANION GAP SERPL CALCULATED.3IONS-SCNC: 9 MMOL/L (ref 5–15)
APTT PPP: 114.9 SECONDS (ref 20–40.3)
APTT PPP: 53.2 SECONDS (ref 20–40.3)
BH CV ECHO MEAS - AI DEC SLOPE: 105 CM/SEC^2
BH CV ECHO MEAS - AI MAX PG: 40.4 MMHG
BH CV ECHO MEAS - AI MAX VEL: 318 CM/SEC
BH CV ECHO MEAS - AI P1/2T: 887 MSEC
BH CV ECHO MEAS - AO MAX PG (FULL): 2.6 MMHG
BH CV ECHO MEAS - AO MAX PG: 8.2 MMHG
BH CV ECHO MEAS - AO MEAN PG (FULL): 3 MMHG
BH CV ECHO MEAS - AO MEAN PG: 5 MMHG
BH CV ECHO MEAS - AO V2 MAX: 143 CM/SEC
BH CV ECHO MEAS - AO V2 MEAN: 104 CM/SEC
BH CV ECHO MEAS - AO V2 VTI: 32 CM
BH CV ECHO MEAS - ASC AORTA: 3.1 CM
BH CV ECHO MEAS - AVA(I,A): 3.3 CM^2
BH CV ECHO MEAS - AVA(I,D): 3.3 CM^2
BH CV ECHO MEAS - AVA(V,A): 3.7 CM^2
BH CV ECHO MEAS - AVA(V,D): 3.7 CM^2
BH CV ECHO MEAS - BSA(HAYCOCK): 2 M^2
BH CV ECHO MEAS - BSA: 2 M^2
BH CV ECHO MEAS - BZI_BMI: 31 KILOGRAMS/M^2
BH CV ECHO MEAS - BZI_METRIC_HEIGHT: 167.6 CM
BH CV ECHO MEAS - BZI_METRIC_WEIGHT: 87.1 KG
BH CV ECHO MEAS - EDV(CUBED): 86.4 ML
BH CV ECHO MEAS - EDV(MOD-SP2): 87 ML
BH CV ECHO MEAS - EDV(MOD-SP4): 91.6 ML
BH CV ECHO MEAS - EDV(TEICH): 88.6 ML
BH CV ECHO MEAS - EF(CUBED): 57.6 %
BH CV ECHO MEAS - EF(MOD-SP2): 28.5 %
BH CV ECHO MEAS - EF(MOD-SP4): 55.5 %
BH CV ECHO MEAS - EF(TEICH): 49.5 %
BH CV ECHO MEAS - ESV(CUBED): 36.6 ML
BH CV ECHO MEAS - ESV(MOD-SP2): 62.2 ML
BH CV ECHO MEAS - ESV(MOD-SP4): 40.8 ML
BH CV ECHO MEAS - ESV(TEICH): 44.8 ML
BH CV ECHO MEAS - FS: 24.9 %
BH CV ECHO MEAS - IVS/LVPW: 0.89
BH CV ECHO MEAS - IVSD: 1.2 CM
BH CV ECHO MEAS - LA DIMENSION: 4.8 CM
BH CV ECHO MEAS - LV DIASTOLIC VOL/BSA (35-75): 46.6 ML/M^2
BH CV ECHO MEAS - LV MASS(C)D: 205.7 GRAMS
BH CV ECHO MEAS - LV MASS(C)DI: 104.6 GRAMS/M^2
BH CV ECHO MEAS - LV MAX PG: 5.6 MMHG
BH CV ECHO MEAS - LV MEAN PG: 2 MMHG
BH CV ECHO MEAS - LV SYSTOLIC VOL/BSA (12-30): 20.8 ML/M^2
BH CV ECHO MEAS - LV V1 MAX: 118 CM/SEC
BH CV ECHO MEAS - LV V1 MEAN: 70.5 CM/SEC
BH CV ECHO MEAS - LV V1 VTI: 23 CM
BH CV ECHO MEAS - LVIDD: 4.4 CM
BH CV ECHO MEAS - LVIDS: 3.3 CM
BH CV ECHO MEAS - LVLD AP2: 8.4 CM
BH CV ECHO MEAS - LVLD AP4: 8.1 CM
BH CV ECHO MEAS - LVLS AP2: 7.6 CM
BH CV ECHO MEAS - LVLS AP4: 7.3 CM
BH CV ECHO MEAS - LVOT AREA (M): 4.5 CM^2
BH CV ECHO MEAS - LVOT AREA: 4.5 CM^2
BH CV ECHO MEAS - LVOT DIAM: 2.4 CM
BH CV ECHO MEAS - LVPWD: 1.3 CM
BH CV ECHO MEAS - MR MAX PG: 49.8 MMHG
BH CV ECHO MEAS - MR MAX VEL: 353 CM/SEC
BH CV ECHO MEAS - MV A MAX VEL: 90.2 CM/SEC
BH CV ECHO MEAS - MV DEC SLOPE: 397 CM/SEC^2
BH CV ECHO MEAS - MV E MAX VEL: 121 CM/SEC
BH CV ECHO MEAS - MV E/A: 1.3
BH CV ECHO MEAS - MV P1/2T MAX VEL: 117 CM/SEC
BH CV ECHO MEAS - MV P1/2T: 86.3 MSEC
BH CV ECHO MEAS - MVA P1/2T LCG: 1.9 CM^2
BH CV ECHO MEAS - MVA(P1/2T): 2.5 CM^2
BH CV ECHO MEAS - PA MAX PG (FULL): 1.6 MMHG
BH CV ECHO MEAS - PA MAX PG: 4.5 MMHG
BH CV ECHO MEAS - PA V2 MAX: 106 CM/SEC
BH CV ECHO MEAS - RAP SYSTOLE: 5 MMHG
BH CV ECHO MEAS - RV MAX PG: 2.9 MMHG
BH CV ECHO MEAS - RV MEAN PG: 1 MMHG
BH CV ECHO MEAS - RV V1 MAX: 84.9 CM/SEC
BH CV ECHO MEAS - RV V1 MEAN: 57.4 CM/SEC
BH CV ECHO MEAS - RV V1 VTI: 20.5 CM
BH CV ECHO MEAS - RVDD: 3.6 CM
BH CV ECHO MEAS - RVSP: 29.6 MMHG
BH CV ECHO MEAS - SI(CUBED): 25.3 ML/M^2
BH CV ECHO MEAS - SI(LVOT): 52.9 ML/M^2
BH CV ECHO MEAS - SI(MOD-SP2): 12.6 ML/M^2
BH CV ECHO MEAS - SI(MOD-SP4): 25.8 ML/M^2
BH CV ECHO MEAS - SI(TEICH): 22.3 ML/M^2
BH CV ECHO MEAS - SV(CUBED): 49.8 ML
BH CV ECHO MEAS - SV(LVOT): 104.1 ML
BH CV ECHO MEAS - SV(MOD-SP2): 24.8 ML
BH CV ECHO MEAS - SV(MOD-SP4): 50.8 ML
BH CV ECHO MEAS - SV(TEICH): 43.8 ML
BH CV ECHO MEAS - TR MAX VEL: 248 CM/SEC
BUN BLD-MCNC: 21 MG/DL (ref 8–23)
BUN/CREAT SERPL: 11.3 (ref 7–25)
CALCIUM SPEC-SCNC: 9 MG/DL (ref 8.6–10.5)
CHLORIDE SERPL-SCNC: 110 MMOL/L (ref 98–107)
CO2 SERPL-SCNC: 20 MMOL/L (ref 22–29)
CREAT BLD-MCNC: 1.86 MG/DL (ref 0.76–1.27)
CREAT UR-MCNC: 82.2 MG/DL
GFR SERPL CREATININE-BSD FRML MDRD: 36 ML/MIN/1.73
GLUCOSE BLD-MCNC: 107 MG/DL (ref 65–99)
HOLD SPECIMEN: NORMAL
LV EF 2D ECHO EST: 58 %
MAXIMAL PREDICTED HEART RATE: 150 BPM
POTASSIUM BLD-SCNC: 4.4 MMOL/L (ref 3.5–5.2)
PROT UR-MCNC: 21 MG/DL
PROT/CREAT UR: 255.5 MG/G CREA (ref 0–200)
SODIUM BLD-SCNC: 139 MMOL/L (ref 136–145)
STRESS TARGET HR: 128 BPM
TROPONIN T SERPL-MCNC: 0.23 NG/ML (ref 0–0.03)

## 2019-12-21 PROCEDURE — 81003 URINALYSIS AUTO W/O SCOPE: CPT | Performed by: NURSE PRACTITIONER

## 2019-12-21 PROCEDURE — 93005 ELECTROCARDIOGRAM TRACING: CPT | Performed by: INTERNAL MEDICINE

## 2019-12-21 PROCEDURE — C1769 GUIDE WIRE: HCPCS

## 2019-12-21 PROCEDURE — B221Z2Z COMPUTERIZED TOMOGRAPHY (CT SCAN) OF MULTIPLE CORONARY ARTERIES USING INTRAVASCULAR OPTICAL COHERENCE: ICD-10-PCS | Performed by: INTERNAL MEDICINE

## 2019-12-21 PROCEDURE — 027034Z DILATION OF CORONARY ARTERY, ONE ARTERY WITH DRUG-ELUTING INTRALUMINAL DEVICE, PERCUTANEOUS APPROACH: ICD-10-PCS | Performed by: INTERNAL MEDICINE

## 2019-12-21 PROCEDURE — 92978 ENDOLUMINL IVUS OCT C 1ST: CPT | Performed by: INTERNAL MEDICINE

## 2019-12-21 PROCEDURE — B2131ZZ FLUOROSCOPY OF MULTIPLE CORONARY ARTERY BYPASS GRAFTS USING LOW OSMOLAR CONTRAST: ICD-10-PCS | Performed by: INTERNAL MEDICINE

## 2019-12-21 PROCEDURE — B41F1ZZ FLUOROSCOPY OF RIGHT LOWER EXTREMITY ARTERIES USING LOW OSMOLAR CONTRAST: ICD-10-PCS | Performed by: INTERNAL MEDICINE

## 2019-12-21 PROCEDURE — 99232 SBSQ HOSP IP/OBS MODERATE 35: CPT | Performed by: INTERNAL MEDICINE

## 2019-12-21 PROCEDURE — 93306 TTE W/DOPPLER COMPLETE: CPT

## 2019-12-21 PROCEDURE — B240ZZ3 ULTRASONOGRAPHY OF SINGLE CORONARY ARTERY, INTRAVASCULAR: ICD-10-PCS | Performed by: INTERNAL MEDICINE

## 2019-12-21 PROCEDURE — C1769 GUIDE WIRE: HCPCS | Performed by: INTERNAL MEDICINE

## 2019-12-21 PROCEDURE — C1753 CATH, INTRAVAS ULTRASOUND: HCPCS | Performed by: INTERNAL MEDICINE

## 2019-12-21 PROCEDURE — 25010000002 HEPARIN (PORCINE) PER 1000 UNITS: Performed by: INTERNAL MEDICINE

## 2019-12-21 PROCEDURE — C1874 STENT, COATED/COV W/DEL SYS: HCPCS | Performed by: INTERNAL MEDICINE

## 2019-12-21 PROCEDURE — C1887 CATHETER, GUIDING: HCPCS | Performed by: INTERNAL MEDICINE

## 2019-12-21 PROCEDURE — 82570 ASSAY OF URINE CREATININE: CPT | Performed by: NURSE PRACTITIONER

## 2019-12-21 PROCEDURE — 63710000001 DIPHENHYDRAMINE PER 50 MG: Performed by: INTERNAL MEDICINE

## 2019-12-21 PROCEDURE — C1894 INTRO/SHEATH, NON-LASER: HCPCS | Performed by: INTERNAL MEDICINE

## 2019-12-21 PROCEDURE — 84484 ASSAY OF TROPONIN QUANT: CPT | Performed by: INTERNAL MEDICINE

## 2019-12-21 PROCEDURE — B2181ZZ FLUOROSCOPY OF LEFT INTERNAL MAMMARY BYPASS GRAFT USING LOW OSMOLAR CONTRAST: ICD-10-PCS | Performed by: INTERNAL MEDICINE

## 2019-12-21 PROCEDURE — 84156 ASSAY OF PROTEIN URINE: CPT | Performed by: NURSE PRACTITIONER

## 2019-12-21 PROCEDURE — 93459 L HRT ART/GRFT ANGIO: CPT | Performed by: INTERNAL MEDICINE

## 2019-12-21 PROCEDURE — B2111ZZ FLUOROSCOPY OF MULTIPLE CORONARY ARTERIES USING LOW OSMOLAR CONTRAST: ICD-10-PCS | Performed by: INTERNAL MEDICINE

## 2019-12-21 PROCEDURE — 25010000002 FENTANYL CITRATE (PF) 100 MCG/2ML SOLUTION: Performed by: INTERNAL MEDICINE

## 2019-12-21 PROCEDURE — 4A023N7 MEASUREMENT OF CARDIAC SAMPLING AND PRESSURE, LEFT HEART, PERCUTANEOUS APPROACH: ICD-10-PCS | Performed by: INTERNAL MEDICINE

## 2019-12-21 PROCEDURE — C9600 PERC DRUG-EL COR STENT SING: HCPCS | Performed by: INTERNAL MEDICINE

## 2019-12-21 PROCEDURE — 85730 THROMBOPLASTIN TIME PARTIAL: CPT | Performed by: INTERNAL MEDICINE

## 2019-12-21 PROCEDURE — 92928 PRQ TCAT PLMT NTRAC ST 1 LES: CPT | Performed by: INTERNAL MEDICINE

## 2019-12-21 PROCEDURE — 93010 ELECTROCARDIOGRAM REPORT: CPT | Performed by: INTERNAL MEDICINE

## 2019-12-21 PROCEDURE — 80048 BASIC METABOLIC PNL TOTAL CA: CPT | Performed by: INTERNAL MEDICINE

## 2019-12-21 PROCEDURE — C1725 CATH, TRANSLUMIN NON-LASER: HCPCS | Performed by: INTERNAL MEDICINE

## 2019-12-21 PROCEDURE — 93306 TTE W/DOPPLER COMPLETE: CPT | Performed by: INTERNAL MEDICINE

## 2019-12-21 PROCEDURE — 94799 UNLISTED PULMONARY SVC/PX: CPT

## 2019-12-21 PROCEDURE — C1760 CLOSURE DEV, VASC: HCPCS | Performed by: INTERNAL MEDICINE

## 2019-12-21 PROCEDURE — 25010000002 MIDAZOLAM PER 1 MG: Performed by: INTERNAL MEDICINE

## 2019-12-21 DEVICE — XIENCE SIERRA™ EVEROLIMUS ELUTING CORONARY STENT SYSTEM 3.00 MM X 28 MM / RAPID-EXCHANGE
Type: IMPLANTABLE DEVICE | Status: FUNCTIONAL
Brand: XIENCE SIERRA™

## 2019-12-21 RX ORDER — ISOSORBIDE MONONITRATE 60 MG/1
120 TABLET, EXTENDED RELEASE ORAL
Status: DISCONTINUED | OUTPATIENT
Start: 2019-12-22 | End: 2019-12-22 | Stop reason: HOSPADM

## 2019-12-21 RX ORDER — FENTANYL CITRATE 50 UG/ML
INJECTION, SOLUTION INTRAMUSCULAR; INTRAVENOUS AS NEEDED
Status: DISCONTINUED | OUTPATIENT
Start: 2019-12-21 | End: 2019-12-21 | Stop reason: HOSPADM

## 2019-12-21 RX ORDER — SODIUM CHLORIDE 9 MG/ML
400 INJECTION, SOLUTION INTRAVENOUS CONTINUOUS
Status: DISPENSED | OUTPATIENT
Start: 2019-12-21 | End: 2019-12-21

## 2019-12-21 RX ORDER — ASPIRIN 81 MG/1
81 TABLET, CHEWABLE ORAL DAILY
Status: DISCONTINUED | OUTPATIENT
Start: 2019-12-22 | End: 2019-12-22 | Stop reason: HOSPADM

## 2019-12-21 RX ORDER — HEPARIN SODIUM 1000 [USP'U]/ML
INJECTION, SOLUTION INTRAVENOUS; SUBCUTANEOUS AS NEEDED
Status: DISCONTINUED | OUTPATIENT
Start: 2019-12-21 | End: 2019-12-21 | Stop reason: HOSPADM

## 2019-12-21 RX ORDER — PANTOPRAZOLE SODIUM 40 MG/1
40 TABLET, DELAYED RELEASE ORAL
Status: DISCONTINUED | OUTPATIENT
Start: 2019-12-21 | End: 2019-12-22 | Stop reason: HOSPADM

## 2019-12-21 RX ORDER — MIDAZOLAM HYDROCHLORIDE 1 MG/ML
INJECTION INTRAMUSCULAR; INTRAVENOUS AS NEEDED
Status: DISCONTINUED | OUTPATIENT
Start: 2019-12-21 | End: 2019-12-21 | Stop reason: HOSPADM

## 2019-12-21 RX ORDER — NITROGLYCERIN 5 MG/ML
INJECTION, SOLUTION INTRAVENOUS AS NEEDED
Status: DISCONTINUED | OUTPATIENT
Start: 2019-12-21 | End: 2019-12-21 | Stop reason: HOSPADM

## 2019-12-21 RX ORDER — ASPIRIN 325 MG
325 TABLET ORAL ONCE
Status: COMPLETED | OUTPATIENT
Start: 2019-12-21 | End: 2019-12-21

## 2019-12-21 RX ORDER — CLOPIDOGREL BISULFATE 75 MG/1
TABLET ORAL AS NEEDED
Status: DISCONTINUED | OUTPATIENT
Start: 2019-12-21 | End: 2019-12-21 | Stop reason: HOSPADM

## 2019-12-21 RX ORDER — LIDOCAINE HYDROCHLORIDE 20 MG/ML
INJECTION, SOLUTION INFILTRATION; PERINEURAL AS NEEDED
Status: DISCONTINUED | OUTPATIENT
Start: 2019-12-21 | End: 2019-12-21 | Stop reason: HOSPADM

## 2019-12-21 RX ORDER — ASPIRIN 325 MG
TABLET ORAL
Status: COMPLETED
Start: 2019-12-21 | End: 2019-12-21

## 2019-12-21 RX ADMIN — HYDRALAZINE HYDROCHLORIDE 50 MG: 50 TABLET ORAL at 15:07

## 2019-12-21 RX ADMIN — SODIUM BICARBONATE 75 MEQ: 84 INJECTION, SOLUTION INTRAVENOUS at 10:20

## 2019-12-21 RX ADMIN — SODIUM BICARBONATE 75 MEQ: 84 INJECTION, SOLUTION INTRAVENOUS at 17:34

## 2019-12-21 RX ADMIN — LABETALOL HYDROCHLORIDE 200 MG: 200 TABLET, FILM COATED ORAL at 15:07

## 2019-12-21 RX ADMIN — CLOPIDOGREL BISULFATE 75 MG: 75 TABLET ORAL at 08:57

## 2019-12-21 RX ADMIN — DIPHENHYDRAMINE HYDROCHLORIDE 25 MG: 25 CAPSULE ORAL at 22:30

## 2019-12-21 RX ADMIN — LABETALOL HYDROCHLORIDE 200 MG: 200 TABLET, FILM COATED ORAL at 03:58

## 2019-12-21 RX ADMIN — Medication 325 MG: at 10:29

## 2019-12-21 RX ADMIN — ASPIRIN 325 MG: 325 TABLET ORAL at 10:29

## 2019-12-21 RX ADMIN — HYDRALAZINE HYDROCHLORIDE 50 MG: 50 TABLET ORAL at 08:57

## 2019-12-21 RX ADMIN — PANTOPRAZOLE SODIUM 40 MG: 40 TABLET, DELAYED RELEASE ORAL at 15:07

## 2019-12-21 RX ADMIN — SERTRALINE HYDROCHLORIDE 100 MG: 50 TABLET ORAL at 08:58

## 2019-12-21 RX ADMIN — ISOSORBIDE MONONITRATE 60 MG: 60 TABLET, EXTENDED RELEASE ORAL at 08:57

## 2019-12-21 RX ADMIN — SODIUM CHLORIDE 75 ML/HR: 4.5 INJECTION, SOLUTION INTRAVENOUS at 08:58

## 2019-12-21 RX ADMIN — HYDRALAZINE HYDROCHLORIDE 50 MG: 50 TABLET ORAL at 20:29

## 2019-12-21 RX ADMIN — SODIUM CHLORIDE 400 ML/HR: 9 INJECTION, SOLUTION INTRAVENOUS at 15:07

## 2019-12-21 RX ADMIN — HEPARIN SODIUM 2600 UNITS: 5000 INJECTION INTRAVENOUS; SUBCUTANEOUS at 02:13

## 2019-12-22 ENCOUNTER — APPOINTMENT (OUTPATIENT)
Dept: ULTRASOUND IMAGING | Facility: HOSPITAL | Age: 69
End: 2019-12-22

## 2019-12-22 VITALS
HEART RATE: 78 BPM | TEMPERATURE: 97.8 F | WEIGHT: 196.4 LBS | DIASTOLIC BLOOD PRESSURE: 66 MMHG | BODY MASS INDEX: 31.57 KG/M2 | SYSTOLIC BLOOD PRESSURE: 138 MMHG | OXYGEN SATURATION: 93 % | RESPIRATION RATE: 18 BRPM | HEIGHT: 66 IN

## 2019-12-22 LAB
ALBUMIN SERPL-MCNC: 3.6 G/DL (ref 3.5–5.2)
ALBUMIN/GLOB SERPL: 1.5 G/DL
ALP SERPL-CCNC: 68 U/L (ref 39–117)
ALT SERPL W P-5'-P-CCNC: 13 U/L (ref 1–41)
ANION GAP SERPL CALCULATED.3IONS-SCNC: 11 MMOL/L (ref 5–15)
AST SERPL-CCNC: 26 U/L (ref 1–40)
BILIRUB SERPL-MCNC: 0.3 MG/DL (ref 0.2–1.2)
BILIRUB UR QL STRIP: NEGATIVE
BUN BLD-MCNC: 17 MG/DL (ref 8–23)
BUN/CREAT SERPL: 9.6 (ref 7–25)
CALCIUM SPEC-SCNC: 8.7 MG/DL (ref 8.6–10.5)
CHLORIDE SERPL-SCNC: 108 MMOL/L (ref 98–107)
CLARITY UR: CLEAR
CO2 SERPL-SCNC: 22 MMOL/L (ref 22–29)
COLOR UR: YELLOW
CREAT BLD-MCNC: 1.77 MG/DL (ref 0.76–1.27)
GFR SERPL CREATININE-BSD FRML MDRD: 38 ML/MIN/1.73
GLOBULIN UR ELPH-MCNC: 2.4 GM/DL
GLUCOSE BLD-MCNC: 106 MG/DL (ref 65–99)
GLUCOSE UR STRIP-MCNC: NEGATIVE MG/DL
HGB UR QL STRIP.AUTO: NEGATIVE
KETONES UR QL STRIP: NEGATIVE
LEUKOCYTE ESTERASE UR QL STRIP.AUTO: NEGATIVE
MAGNESIUM SERPL-MCNC: 1.9 MG/DL (ref 1.6–2.4)
NITRITE UR QL STRIP: NEGATIVE
PH UR STRIP.AUTO: 6 [PH] (ref 5–9)
PHOSPHATE SERPL-MCNC: 3.2 MG/DL (ref 2.5–4.5)
POTASSIUM BLD-SCNC: 4.2 MMOL/L (ref 3.5–5.2)
PROT SERPL-MCNC: 6 G/DL (ref 6–8.5)
PROT UR QL STRIP: ABNORMAL
PTH-INTACT SERPL-MCNC: 132.5 PG/ML (ref 15–65)
SODIUM BLD-SCNC: 141 MMOL/L (ref 136–145)
SP GR UR STRIP: 1.04 (ref 1–1.03)
UROBILINOGEN UR QL STRIP: ABNORMAL

## 2019-12-22 PROCEDURE — 99232 SBSQ HOSP IP/OBS MODERATE 35: CPT | Performed by: INTERNAL MEDICINE

## 2019-12-22 PROCEDURE — 80053 COMPREHEN METABOLIC PANEL: CPT | Performed by: INTERNAL MEDICINE

## 2019-12-22 PROCEDURE — 83970 ASSAY OF PARATHORMONE: CPT | Performed by: NURSE PRACTITIONER

## 2019-12-22 PROCEDURE — 93005 ELECTROCARDIOGRAM TRACING: CPT | Performed by: INTERNAL MEDICINE

## 2019-12-22 PROCEDURE — 83735 ASSAY OF MAGNESIUM: CPT | Performed by: NURSE PRACTITIONER

## 2019-12-22 PROCEDURE — 93010 ELECTROCARDIOGRAM REPORT: CPT | Performed by: INTERNAL MEDICINE

## 2019-12-22 PROCEDURE — 84100 ASSAY OF PHOSPHORUS: CPT | Performed by: NURSE PRACTITIONER

## 2019-12-22 PROCEDURE — 76775 US EXAM ABDO BACK WALL LIM: CPT

## 2019-12-22 RX ORDER — ISOSORBIDE MONONITRATE 120 MG/1
120 TABLET, EXTENDED RELEASE ORAL
Qty: 30 TABLET | Refills: 0 | Status: SHIPPED | OUTPATIENT
Start: 2019-12-23 | End: 2020-08-14 | Stop reason: SDUPTHER

## 2019-12-22 RX ORDER — AMLODIPINE BESYLATE 5 MG/1
5 TABLET ORAL
Status: DISCONTINUED | OUTPATIENT
Start: 2019-12-22 | End: 2019-12-22 | Stop reason: HOSPADM

## 2019-12-22 RX ORDER — AMLODIPINE BESYLATE 5 MG/1
5 TABLET ORAL
Qty: 30 TABLET | Refills: 0 | Status: SHIPPED | OUTPATIENT
Start: 2019-12-23 | End: 2020-01-10

## 2019-12-22 RX ORDER — ASPIRIN 81 MG/1
81 TABLET, CHEWABLE ORAL DAILY
Qty: 30 TABLET | Refills: 0 | Status: SHIPPED | OUTPATIENT
Start: 2019-12-23

## 2019-12-22 RX ADMIN — LABETALOL HYDROCHLORIDE 200 MG: 200 TABLET, FILM COATED ORAL at 04:47

## 2019-12-22 RX ADMIN — ASPIRIN 81 MG 81 MG: 81 TABLET ORAL at 08:10

## 2019-12-22 RX ADMIN — SERTRALINE HYDROCHLORIDE 100 MG: 50 TABLET ORAL at 08:11

## 2019-12-22 RX ADMIN — ISOSORBIDE MONONITRATE 120 MG: 60 TABLET, EXTENDED RELEASE ORAL at 08:10

## 2019-12-22 RX ADMIN — AMLODIPINE BESYLATE 5 MG: 5 TABLET ORAL at 10:07

## 2019-12-22 RX ADMIN — SODIUM CHLORIDE, PRESERVATIVE FREE 10 ML: 5 INJECTION INTRAVENOUS at 08:11

## 2019-12-22 RX ADMIN — PANTOPRAZOLE SODIUM 40 MG: 40 TABLET, DELAYED RELEASE ORAL at 08:10

## 2019-12-22 RX ADMIN — CLOPIDOGREL BISULFATE 75 MG: 75 TABLET ORAL at 08:11

## 2019-12-22 RX ADMIN — HYDRALAZINE HYDROCHLORIDE 50 MG: 50 TABLET ORAL at 08:11

## 2019-12-22 RX ADMIN — SODIUM BICARBONATE 75 MEQ: 84 INJECTION, SOLUTION INTRAVENOUS at 04:47

## 2019-12-23 ENCOUNTER — TELEPHONE (OUTPATIENT)
Dept: CARDIOLOGY | Facility: CLINIC | Age: 69
End: 2019-12-23

## 2019-12-23 ENCOUNTER — LAB (OUTPATIENT)
Dept: LAB | Facility: HOSPITAL | Age: 69
End: 2019-12-23

## 2019-12-23 ENCOUNTER — READMISSION MANAGEMENT (OUTPATIENT)
Dept: CALL CENTER | Facility: HOSPITAL | Age: 69
End: 2019-12-23

## 2019-12-23 ENCOUNTER — DOCUMENTATION (OUTPATIENT)
Dept: PULMONOLOGY | Facility: HOSPITAL | Age: 69
End: 2019-12-23

## 2019-12-23 DIAGNOSIS — I21.4 NSTEMI, INITIAL EPISODE OF CARE (HCC): ICD-10-CM

## 2019-12-23 LAB
ANION GAP SERPL CALCULATED.3IONS-SCNC: 13 MMOL/L (ref 5–15)
BUN BLD-MCNC: 23 MG/DL (ref 8–23)
BUN/CREAT SERPL: 11 (ref 7–25)
CALCIUM SPEC-SCNC: 9.8 MG/DL (ref 8.6–10.5)
CHLORIDE SERPL-SCNC: 109 MMOL/L (ref 98–107)
CO2 SERPL-SCNC: 20 MMOL/L (ref 22–29)
CREAT BLD-MCNC: 2.09 MG/DL (ref 0.76–1.27)
GFR SERPL CREATININE-BSD FRML MDRD: 32 ML/MIN/1.73
GLUCOSE BLD-MCNC: 133 MG/DL (ref 65–99)
POTASSIUM BLD-SCNC: 4.5 MMOL/L (ref 3.5–5.2)
SODIUM BLD-SCNC: 142 MMOL/L (ref 136–145)

## 2019-12-23 PROCEDURE — 80048 BASIC METABOLIC PNL TOTAL CA: CPT

## 2019-12-23 PROCEDURE — 36415 COLL VENOUS BLD VENIPUNCTURE: CPT

## 2019-12-23 NOTE — TELEPHONE ENCOUNTER
Called and talked to patients wife about him having piper issues with pooping. Asked if this has happened over night or since his heart cath, she said that it was over night. Told her that she could give him an over the counter to help stop, but if it continues to follow up with his PCP, Dr. Fox  Patients wife voiced understanding     ----- Message from Krissy Myles sent at 12/23/2019 12:57 PM CST -----  Contact: [phone]  Pt had a procedure done. Been up all night with june wife thought you should know

## 2019-12-23 NOTE — OUTREACH NOTE
Prep Survey      Responses   Facility patient discharged from?  [place]   Is patient eligible?  Yes   Discharge diagnosis  NSTEMI   Does the patient have one of the following disease processes/diagnoses(primary or secondary)?  Acute MI (STEMI,NSTEMI)   Does the patient have Home health ordered?  No   Is there a DME ordered?  No   Comments regarding appointments  call for apmt   Prep survey completed?  Yes          Nasreen Morrison RN

## 2019-12-24 ENCOUNTER — TRANSCRIBE ORDERS (OUTPATIENT)
Dept: CARDIOLOGY | Facility: CLINIC | Age: 69
End: 2019-12-24

## 2019-12-26 ENCOUNTER — READMISSION MANAGEMENT (OUTPATIENT)
Dept: CALL CENTER | Facility: HOSPITAL | Age: 69
End: 2019-12-26

## 2019-12-26 DIAGNOSIS — I25.10 ATHEROSCLEROSIS OF NATIVE CORONARY ARTERY OF NATIVE HEART WITHOUT ANGINA PECTORIS: Primary | ICD-10-CM

## 2019-12-26 NOTE — OUTREACH NOTE
AMI Week 1 Survey      Responses   Facility patient discharged from?  [place]   Does the patient have one of the following disease processes/diagnoses(primary or secondary)?  Acute MI (STEMI,NSTEMI)   Is there a successful TCM telephone encounter documented?  No   Week 1 attempt successful?  Yes   Call start time  1047   Call end time  1051   Discharge diagnosis  NSTEMI   Meds reviewed with patient/caregiver?  Yes   Is the patient having any side effects they believe may be caused by any medication additions or changes?  No   Does the patient have all prescriptions related to this admission filled (includes statins,anticoagulants,HTN meds,anti-arrhythmia meds)  Yes   Is the patient taking all medications as directed (includes completed medication regime)?  Yes   Comments regarding appointments  call for apmt   Does the patient have a primary care provider?   Yes   Does the patient have an appointment with their PCP,cardiologist,or clinic within 7 days of discharge?  Greater than 7 days   Comments regarding PCP  Dr. García on January 8, 2020   What is preventing the patient from scheduling follow up appointments within 7 days of discharge?  Earlier appointment not available   Nursing Interventions  Verified appointment date/time/provider   Has home health visited the patient within 72 hours of discharge?  N/A   Psychosocial issues?  No   Did the patient receive a copy of their discharge instructions?  Yes   Nursing interventions  Reviewed instructions with patient   What is the patient's perception of their health status since discharge?  Improving   Nursing interventions  Nurse provided patient education   Is the patient/caregiver able to teach back signs and symptoms of when to call for help immediately:  Sudden chest discomfort, Sudden discomfort in arms, back, neck or jaw, Shortness of breath at any time, Nausea or vomiting, Dizziness or lightheadedness   Nursing interventions  Nurse provided patient  education   Is the pateint /caregiver able to teach back the importance of cardiac rehab?  Yes   Nursing interventions  Provided education on importance of cardiac rehab   Is the patient/caregiver able to teach back lifestyle changes to help prevent MIs  Regular exercise as approved by provider, Maintaining a healthy weight, Reducing stress   Is the patient/caregiver able to teach back ways to prevent a second heart attack:  Take medications, Follow up with MD, Manage risk factors   Is the patient/caregiver able to teach back the hierarchy of who to call/visit for symptoms/problems? PCP, Specialist, Home health nurse, Urgent Care, ED, 911  Yes   Week 1 call completed?  Yes          Alexander Hamilton, NATALIE

## 2019-12-27 ENCOUNTER — LAB (OUTPATIENT)
Dept: LAB | Facility: HOSPITAL | Age: 69
End: 2019-12-27

## 2019-12-27 DIAGNOSIS — I25.10 ATHEROSCLEROSIS OF NATIVE CORONARY ARTERY OF NATIVE HEART WITHOUT ANGINA PECTORIS: ICD-10-CM

## 2019-12-27 LAB
ANION GAP SERPL CALCULATED.3IONS-SCNC: 12 MMOL/L (ref 5–15)
BUN BLD-MCNC: 23 MG/DL (ref 8–23)
BUN/CREAT SERPL: 12.1 (ref 7–25)
CALCIUM SPEC-SCNC: 9 MG/DL (ref 8.6–10.5)
CHLORIDE SERPL-SCNC: 108 MMOL/L (ref 98–107)
CO2 SERPL-SCNC: 20 MMOL/L (ref 22–29)
CREAT BLD-MCNC: 1.9 MG/DL (ref 0.76–1.27)
GFR SERPL CREATININE-BSD FRML MDRD: 35 ML/MIN/1.73
GLUCOSE BLD-MCNC: 108 MG/DL (ref 65–99)
POTASSIUM BLD-SCNC: 3.6 MMOL/L (ref 3.5–5.2)
SODIUM BLD-SCNC: 140 MMOL/L (ref 136–145)

## 2019-12-27 PROCEDURE — 80048 BASIC METABOLIC PNL TOTAL CA: CPT

## 2019-12-27 PROCEDURE — 36415 COLL VENOUS BLD VENIPUNCTURE: CPT

## 2020-01-02 ENCOUNTER — READMISSION MANAGEMENT (OUTPATIENT)
Dept: CALL CENTER | Facility: HOSPITAL | Age: 70
End: 2020-01-02

## 2020-01-02 NOTE — OUTREACH NOTE
AMI Week 2 Survey      Responses   Facility patient discharged from?  [place]   Does the patient have one of the following disease processes/diagnoses(primary or secondary)?  Acute MI (STEMI,NSTEMI)   Week 2 attempt successful?  Yes   Call start time  1343   Call end time  1344   Discharge diagnosis  NSTEMI   Meds reviewed with patient/caregiver?  Yes   Is the patient having any side effects they believe may be caused by any medication additions or changes?  No   Does the patient have all prescriptions related to this admission filled (includes statins,anticoagulants,HTN meds,anti-arrhythmia meds)  Yes   Is the patient taking all medications as directed (includes completed medication regime)?  Yes   Does the patient have a primary care provider?   Yes   Does the patient have an appointment with their PCP,cardiologist,or clinic within 7 days of discharge?  Yes   Has the patient kept scheduled appointments due by today?  Yes   Psychosocial issues?  No   Did the patient receive a copy of their discharge instructions?  Yes   Nursing interventions  Reviewed instructions with patient, Educated on MyChart   What is the patient's perception of their health status since discharge?  Improving   Nursing interventions  Nurse provided patient education   Is the patient/caregiver able to teach back signs and symptoms of when to call for help immediately:  Sudden chest discomfort, Sudden discomfort in arms, back, neck or jaw, Shortness of breath at any time, Sudden sweating or clammy skin, Nausea or vomiting, Dizziness or lightheadedness, Irregular or rapid heart rate   Nursing interventions  Nurse provided patient education   Is the patient/caregiver able to teach back the hierarchy of who to call/visit for symptoms/problems? PCP, Specialist, Home health nurse, Urgent Care, ED, 911  Yes   Week 2 call completed?  Yes          Izabella Leonardo RN

## 2020-01-10 ENCOUNTER — OFFICE VISIT (OUTPATIENT)
Dept: CARDIOLOGY | Facility: CLINIC | Age: 70
End: 2020-01-10

## 2020-01-10 VITALS
SYSTOLIC BLOOD PRESSURE: 134 MMHG | DIASTOLIC BLOOD PRESSURE: 62 MMHG | BODY MASS INDEX: 31.92 KG/M2 | HEART RATE: 71 BPM | WEIGHT: 198.6 LBS | OXYGEN SATURATION: 98 % | HEIGHT: 66 IN

## 2020-01-10 DIAGNOSIS — I25.118 CORONARY ARTERY DISEASE OF NATIVE HEART WITH STABLE ANGINA PECTORIS, UNSPECIFIED VESSEL OR LESION TYPE (HCC): Primary | Chronic | ICD-10-CM

## 2020-01-10 DIAGNOSIS — R06.02 SOB (SHORTNESS OF BREATH): ICD-10-CM

## 2020-01-10 DIAGNOSIS — I10 ESSENTIAL HYPERTENSION: Primary | Chronic | ICD-10-CM

## 2020-01-10 DIAGNOSIS — I25.10 ATHEROSCLEROSIS OF NATIVE CORONARY ARTERY OF NATIVE HEART WITHOUT ANGINA PECTORIS: ICD-10-CM

## 2020-01-10 PROCEDURE — 99215 OFFICE O/P EST HI 40 MIN: CPT | Performed by: INTERNAL MEDICINE

## 2020-01-10 RX ORDER — FUROSEMIDE 40 MG/1
40 TABLET ORAL DAILY
Qty: 30 TABLET | Refills: 0 | Status: SHIPPED | OUTPATIENT
Start: 2020-01-10 | End: 2020-03-10

## 2020-01-10 RX ORDER — AMLODIPINE BESYLATE 5 MG/1
10 TABLET ORAL
Qty: 30 TABLET | Refills: 3 | Status: SHIPPED | OUTPATIENT
Start: 2020-01-10 | End: 2020-08-14 | Stop reason: SINTOL

## 2020-01-10 NOTE — PROGRESS NOTES
[facility] Cardiology  OFFICE NOTE    Cardiovascular Medicine  Karime Moore M.D., RPVI         No referring provider defined for this encounter.    Thank you for asking me to see Kenya Ray Brian for CAD.    History of Present Illness  This is a 69 y.o. male with:    1. Atherosclerosis of native coronary artery of native heart without angina pectoris    2. Presence of aortocoronary bypass graft    3. Essential hypertension    4. Pure hypercholesterolemia          Chief complaint - Follow-up cad        History of present [id number] gentleman with history of coronary disease S/P two vessel bypass with LIMA to LAD and vein graft to the right coronary artery,  he cannot be on any statins including Zetia due to intolerance and myalgia.  He was on repatha and his cholesterol is very good, however he he had to stop it because of insurance..  But he could not continue it due to increased cost.    his EKG showed sinus rhythm with LVH.   Was admitted to hospital December 2019 with the NSTEMI, cardiac cath showed patent SVGs however he had severe in-stent restenosis in the circumflex which underwent successful PCI.  Here for routine follow-up.  Denying any chest pain however he shortness of breath on exertion.  NYHA class II-III.       His  blood pressure is well controlled on isosorbide 120 mg daily,  hydralazine to 50 mg  3 times a day, and continue labetalol 100 milligrams twice a day.  Tolerating aspirin Plavix 30 bleeding problems.         Review of Systems - ROS  Constitution: Negative for weakness, weight gain and weight loss.   HENT: Negative for congestion.    Eyes: Negative for blurred vision.   Cardiovascular: As mentioned above  Respiratory: Negative for cough and hemoptysis.    Endocrine: Negative for polydipsia and polyuria.   Hematologic/Lymphatic: Negative for bleeding problem. Does not bruise/bleed easily.   Skin: Negative for flushing.   Musculoskeletal: Negative for neck pain  and stiffness.   Gastrointestinal: Negative for abdominal pain, diarrhea, jaundice, melena, nausea and vomiting.   Genitourinary: Negative for dysuria and hematuria.   Neurological: Negative for dizziness, focal weakness and numbness.   Psychiatric/Behavioral: Negative for altered mental status and depression.          All other systems were reviewed and were negative.    family history includes Coronary artery disease in his brother, father, and maternal grandfather; Hearing loss in his mother.     reports that he has never smoked. He has never used smokeless tobacco. He reports that he does not drink alcohol or use drugs.    Allergies   Allergen Reactions   • Cefoxitin Anaphylaxis   • Crestor [Rosuvastatin Calcium] Other (See Comments)     Other allergic reaction   • Hydrocodone Itching   • Lipitor [Atorvastatin]    • Pravastatin Other (See Comments)     Other allergic reaction   • Zetia [Ezetimibe] Myalgia   • Zocor [Simvastatin] Other (See Comments)     Other allergic reaction   • Lortab [Hydrocodone-Acetaminophen] Rash         Current Outpatient Medications:   •  amLODIPine (NORVASC) 5 MG tablet, Take 2 tablets by mouth Daily., Disp: 30 tablet, Rfl: 3  •  aspirin 81 MG chewable tablet, Chew 1 tablet Daily., Disp: 30 tablet, Rfl: 0  •  clopidogrel (PLAVIX) 75 MG tablet, TAKE ONE TABLET BY MOUTH EVERY DAY, Disp: 90 tablet, Rfl: 3  •  hydrALAZINE (APRESOLINE) 50 MG tablet, Take 1 tablet by mouth 3 (Three) Times a Day., Disp: 270 tablet, Rfl: 6  •  isosorbide mononitrate (IMDUR) 120 MG 24 hr tablet, Take 1 tablet by mouth Daily., Disp: 30 tablet, Rfl: 0  •  labetalol (NORMODYNE) 100 MG tablet, Take 1 tablet by mouth Every 12 (Twelve) Hours., Disp: 180 tablet, Rfl: 3  •  omeprazole (priLOSEC) 20 MG capsule, Take 20 mg by mouth Daily., Disp: , Rfl:   •  sertraline (ZOLOFT) 100 MG tablet, Take 100 mg by mouth Daily., Disp: , Rfl:   •  furosemide (LASIX) 40 MG tablet, Take 1 tablet by mouth Daily., Disp: 30 tablet,  "Rfl: 0    Physical Exam:  Vitals:    01/10/20 0803   BP: 134/62   BP Location: Left arm   Patient Position: Sitting   Cuff Size: Adult   Pulse: 71   SpO2: 98%   Weight: 90.1 kg (198 lb 9.6 oz)   Height: 167.6 cm (65.98\")   PainSc: 0-No pain     Current Pain Level: none  Pulse Ox: Normal  on room air  General: alert, appears stated age and cooperative     Body Habitus: well-nourished    HEENT: Head: Normocephalic, no lesions, without obvious abnormality. No arcus senilis, xanthelasma or xanthomas.    Neuro: alert, oriented x3  Pulses: 2+ and symmetric  JVP: Volume/Pulsation: Normal.  Normal waveforms.   Appropriate inspiratory decrease.  No Kussmaul's. No Alba's.   Carotid Exam: no bruit normal pulsation bilaterally   Carotid Volume: normal.     Respirations: no increased work of breathing   Chest:  Normal    Pulmonary:Normal   Precordium: Normal impulses. P2 is not palpable.  RV Heave: absent  LV Heave: absent  [place]:  normal size and placement  Palpable S4: absent.  Heart rate: normal    Heart Rhythm: regular     Heart Sounds: S1: normal  S2: normal  S3: absent   S4: absent  Opening Snap: absent    Pericardial Rub:  Absent: .    Abdomen:   Appearance: normal .  Palpation: Soft, non-tender to palpation, bowel sounds positive in all four quadrants; no guarding or rebound tenderness  Extremity: no edema.   LE Skin: no rashes  LE Hair:  normal  LE Pulses: well perfused with normal pulses in the distal extremities  Pallor on elevation: Absent. Rubor on dependency: None      DATA REVIEWED:         ECG/EMG Results (all)     None        ---------------------------------------------------  TTE/JOHN:  Results for orders placed during the hospital encounter of 12/20/19   Adult Transthoracic Echo Complete W/ Cont if Necessary Per Protocol    Narrative · Left ventricular wall thickness is consistent with mild-to-moderate   concentric hypertrophy.  · Estimated EF = 58%.  · Left ventricular systolic function is normal.  · Left " ventricular diastolic dysfunction (grade I) consistent with   impaired relaxation.  · Left atrial cavity size is moderately dilated.  · Mild mitral valve regurgitation is present  · Mild tricuspid valve regurgitation is present.        CT:   Us Renal Bilateral    Result Date: 12/22/2019  CONCLUSION: Minimal increased echogenicity of the kidneys, suggesting medical renal disease. Minimal left hydronephrosis. Circumferential thickening urinary bladder wall which may represent outlet obstruction or cystitis. The prostate is minimally enlarged. [id number] Electronically signed by:  Octavio Reagan MD  12/22/2019 9:47 AM [facility] Workstation: [id number]        --------------------------------------------------------------------------------------------------  LABS:     The CVD Risk score (DAISHA'Agostino, et al., 2008) failed to calculate for the following reasons:    The patient has a prior MI, stroke, CHF, or peripheral vascular disease diagnosis         Lab Results   Component Value Date    GLUCOSE 108 (H) 12/27/2019    BUN 23 12/27/2019    CREATININE 1.90 (H) 12/27/2019    EGFRIFNONA 35 (L) 12/27/2019    BCR 12.1 12/27/2019    K 3.6 12/27/2019    CO2 20.0 (L) 12/27/2019    CALCIUM 9.0 12/27/2019    ALBUMIN 3.60 12/22/2019    AST 26 12/22/2019    ALT 13 12/22/2019     Lab Results   Component Value Date    WBC 7.08 12/20/2019    HGB 11.4 (L) 12/20/2019    HCT 33.3 (L) 12/20/2019    MCV 93.8 12/20/2019     12/20/2019     No results found for: CHOL, CHLPL, TRIG, HDL, LDL, LDLDIRECT  Lab Results   Component Value Date    TSH 1.69 08/01/2019     Lab Results   Component Value Date    CKMB 3.5 12/16/2019    TROPONINI 0.560 (H) 12/20/2019    TROPONINT 0.234 (C) 12/21/2019     Lab Results   Component Value Date    HGBA1C 5.4 04/23/2015     No results found for: DDIMER  Lab Results   Component Value Date    ALT 13 12/22/2019     Lab Results   Component Value Date    HGBA1C 5.4 04/23/2015     Lab Results   Component Value Date    CREATININE  1.90 (H) 12/27/2019     No results found for: IRON, TIBC, FERRITIN  Lab Results   Component Value Date    INR 1.07 12/20/2019    INR 1.5 05/05/2015    INR 1.1 04/23/2015    PROTIME 13.7 12/20/2019    PROTIME 17.6 (H) 05/05/2015    PROTIME 13.7 04/23/2015       Assessment/Plan     1.    Coronary artery disease:  Known history of coronary artery disease status post PCI to OM1 in 2009, proximal RCA in 2011, status post CABG with a LIMA to LAD and a sequential graft to RPL and RPDA in 2015 has presented to the hospital with new onset chest pain.  EKG showed ST depressions in lateral leads.  Trending troponin to 0.2.  Patient was taken to the Cath Lab where he was found to have patent SVG and LIMA.  He had severe in-stent restenosis of stent in the OM1.  For which he underwent successful PCI with a 3.0 x 28 mm Xience Cele stent with excellent angiographic results.  Patient tolerated the procedure well..  Echocardiogram with normal LV function.  New aspirin Plavix.     2.  Hypertension:  Better controlled now on current regimen.     3.  Hyperlipidemia:  Patient has been allergic to statins.  Will consider PCSK9 better,      4.  CKD:  Creatinine is better today.      5.  Shortness of breath:  Factorial from obesity, hypertension, diastolic dysfunction.  I encouraged exercise, weight loss and will be enrolled in cardiac rehab.  We will also give him a trial of Lasix 40 mg for 3 days and check BMP on Monday.  Also checking PFTs.  Depending on the work-up may consider VQ scan.        Patient's Body mass index is 31.7 kg/m². BMI is above normal parameters. Recommendations include: exercise counseling and nutrition counseling.        Kenya Torres is not a smoker     AAA Screening:   Not needed          This document has been electronically signed by Karime Moore MD on January 10, 2020 8:35 AM

## 2020-01-13 ENCOUNTER — READMISSION MANAGEMENT (OUTPATIENT)
Dept: CALL CENTER | Facility: HOSPITAL | Age: 70
End: 2020-01-13

## 2020-01-13 ENCOUNTER — TELEPHONE (OUTPATIENT)
Dept: CARDIOLOGY | Facility: CLINIC | Age: 70
End: 2020-01-13

## 2020-01-13 DIAGNOSIS — I25.118 CORONARY ARTERY DISEASE OF NATIVE HEART WITH STABLE ANGINA PECTORIS, UNSPECIFIED VESSEL OR LESION TYPE (HCC): Primary | Chronic | ICD-10-CM

## 2020-01-13 NOTE — TELEPHONE ENCOUNTER
Talked to patient about getting his lab work done so we can start the process for his new medication.     Patient voiced understanding

## 2020-01-13 NOTE — OUTREACH NOTE
AMI Week 3 Survey      Responses   Facility patient discharged from?  [place]   Does the patient have one of the following disease processes/diagnoses(primary or secondary)?  Acute MI (STEMI,NSTEMI)   Week 3 attempt successful?  No   Unsuccessful attempts  Attempt 1          Giles Kumari RN

## 2020-01-14 ENCOUNTER — LAB (OUTPATIENT)
Dept: LAB | Facility: HOSPITAL | Age: 70
End: 2020-01-14

## 2020-01-14 DIAGNOSIS — I25.118 CORONARY ARTERY DISEASE OF NATIVE HEART WITH STABLE ANGINA PECTORIS, UNSPECIFIED VESSEL OR LESION TYPE (HCC): Chronic | ICD-10-CM

## 2020-01-14 DIAGNOSIS — I10 ESSENTIAL HYPERTENSION: Chronic | ICD-10-CM

## 2020-01-14 LAB
ALBUMIN SERPL-MCNC: 4.2 G/DL (ref 3.5–5.2)
ALBUMIN/GLOB SERPL: 1.6 G/DL
ALP SERPL-CCNC: 77 U/L (ref 39–117)
ALT SERPL W P-5'-P-CCNC: 12 U/L (ref 1–41)
ANION GAP SERPL CALCULATED.3IONS-SCNC: 11.9 MMOL/L (ref 5–15)
ANION GAP SERPL CALCULATED.3IONS-SCNC: 13 MMOL/L (ref 5–15)
AST SERPL-CCNC: 17 U/L (ref 1–40)
BILIRUB SERPL-MCNC: 0.3 MG/DL (ref 0.2–1.2)
BUN BLD-MCNC: 25 MG/DL (ref 8–23)
BUN BLD-MCNC: 25 MG/DL (ref 8–23)
BUN/CREAT SERPL: 11.5 (ref 7–25)
BUN/CREAT SERPL: 11.9 (ref 7–25)
CALCIUM SPEC-SCNC: 9.2 MG/DL (ref 8.6–10.5)
CALCIUM SPEC-SCNC: 9.4 MG/DL (ref 8.6–10.5)
CHLORIDE SERPL-SCNC: 103 MMOL/L (ref 98–107)
CHLORIDE SERPL-SCNC: 107 MMOL/L (ref 98–107)
CHOLEST SERPL-MCNC: 182 MG/DL (ref 0–200)
CO2 SERPL-SCNC: 24 MMOL/L (ref 22–29)
CO2 SERPL-SCNC: 24.1 MMOL/L (ref 22–29)
CREAT BLD-MCNC: 2.1 MG/DL (ref 0.76–1.27)
CREAT BLD-MCNC: 2.18 MG/DL (ref 0.76–1.27)
GFR SERPL CREATININE-BSD FRML MDRD: 30 ML/MIN/1.73
GFR SERPL CREATININE-BSD FRML MDRD: 31 ML/MIN/1.73
GLOBULIN UR ELPH-MCNC: 2.7 GM/DL
GLUCOSE BLD-MCNC: 108 MG/DL (ref 65–99)
GLUCOSE BLD-MCNC: 108 MG/DL (ref 65–99)
HDLC SERPL-MCNC: 32 MG/DL (ref 40–60)
LDLC SERPL CALC-MCNC: 132 MG/DL (ref 0–100)
LDLC/HDLC SERPL: 4.11 {RATIO}
POTASSIUM BLD-SCNC: 3.9 MMOL/L (ref 3.5–5.2)
POTASSIUM BLD-SCNC: 4 MMOL/L (ref 3.5–5.2)
PROT SERPL-MCNC: 6.9 G/DL (ref 6–8.5)
SODIUM BLD-SCNC: 139 MMOL/L (ref 136–145)
SODIUM BLD-SCNC: 144 MMOL/L (ref 136–145)
TRIGL SERPL-MCNC: 92 MG/DL (ref 0–150)
VLDLC SERPL-MCNC: 18.4 MG/DL (ref 5–40)

## 2020-01-14 PROCEDURE — 80053 COMPREHEN METABOLIC PANEL: CPT

## 2020-01-14 PROCEDURE — 80061 LIPID PANEL: CPT

## 2020-01-14 PROCEDURE — 36415 COLL VENOUS BLD VENIPUNCTURE: CPT

## 2020-01-15 ENCOUNTER — DOCUMENTATION (OUTPATIENT)
Dept: CARDIOLOGY | Facility: CLINIC | Age: 70
End: 2020-01-15

## 2020-01-15 NOTE — PROGRESS NOTES
Faxed over paperwork to Dr. Patton office to get the process started for patient to get started on Repatha.

## 2020-01-17 ENCOUNTER — READMISSION MANAGEMENT (OUTPATIENT)
Dept: CALL CENTER | Facility: HOSPITAL | Age: 70
End: 2020-01-17

## 2020-01-17 ENCOUNTER — TELEPHONE (OUTPATIENT)
Dept: CARDIOLOGY | Facility: CLINIC | Age: 70
End: 2020-01-17

## 2020-01-17 NOTE — OUTREACH NOTE
AMI Week 3 Survey      Responses   Facility patient discharged from?  [place]   Does the patient have one of the following disease processes/diagnoses(primary or secondary)?  Acute MI (STEMI,NSTEMI)   Week 3 attempt successful?  No   Unsuccessful attempts  Attempt 2          Elizabeth Aburto RN

## 2020-01-20 DIAGNOSIS — N18.30 STAGE 3 CHRONIC KIDNEY DISEASE (HCC): Primary | ICD-10-CM

## 2020-01-23 ENCOUNTER — OFFICE VISIT (OUTPATIENT)
Dept: PULMONOLOGY | Facility: CLINIC | Age: 70
End: 2020-01-23

## 2020-01-23 DIAGNOSIS — R06.02 SHORTNESS OF BREATH: Primary | ICD-10-CM

## 2020-01-23 PROCEDURE — 94727 GAS DIL/WSHOT DETER LNG VOL: CPT | Performed by: INTERNAL MEDICINE

## 2020-01-23 PROCEDURE — 94060 EVALUATION OF WHEEZING: CPT | Performed by: INTERNAL MEDICINE

## 2020-01-23 PROCEDURE — 94729 DIFFUSING CAPACITY: CPT | Performed by: INTERNAL MEDICINE

## 2020-01-27 ENCOUNTER — HOSPITAL ENCOUNTER (OUTPATIENT)
Dept: CARDIAC REHAB | Facility: HOSPITAL | Age: 70
Setting detail: THERAPIES SERIES
Discharge: HOME | End: 2020-01-27

## 2020-01-27 VITALS
WEIGHT: 196 LBS | BODY MASS INDEX: 31.5 KG/M2 | DIASTOLIC BLOOD PRESSURE: 67 MMHG | SYSTOLIC BLOOD PRESSURE: 147 MMHG | HEART RATE: 71 BPM | HEIGHT: 66 IN

## 2020-01-27 DIAGNOSIS — Z98.61 POST PTCA: Primary | ICD-10-CM

## 2020-01-27 PROCEDURE — 93798 PHYS/QHP OP CAR RHAB W/ECG: CPT

## 2020-01-27 NOTE — PROGRESS NOTES
Cardiac Rehab Initial Assessment      Name: Kenya Torres  :1950 Allergies:Cefoxitin; Crestor [rosuvastatin calcium]; Hydrocodone; Lipitor [atorvastatin]; Pravastatin; Zetia [ezetimibe]; Zocor [simvastatin]; and Lortab [hydrocodone-acetaminophen]   MRN: [id number] 69 y.o. Physician: Nickolas Fox MD   Primary Diagnosis:    Diagnosis Plan   1. Post PTCA      Event Date: 19 Specialist:    Secondary Diagnosis: AMI  Risk Stratification:Low Risk Note Author: Radha Rangel RN     Cardiovascular History:CABG PCI AMI     EXERCISE AT HOME  no    EF: 58%      Source: echo          Ambulatory Status:Independent  Ambulatory Fall Risk Assessed on Initial Visit: yes 6 Minute Walk Pre- Cardiac Rehab:  Distance:1296ft      RPE:6  Max. HR: 88       SPO2:96    MET: 3  MPH: 2.5             RPD: 0  Resting BP: 147/67 LA, 145/67 RA    Peak BP: 168/85  Recovery BP: 160/85        NUTRITION  Lipids:yes If yes, labs as follows;  Total: No components found for: CHOLESTEROL  HDL:   HDL Cholesterol   Date Value Ref Range Status   2020 32 (L) 40 - 60 mg/dL Final    Lipids continued:  LDL:  LDL Cholesterol    Date Value Ref Range Status   2020 132 (H) 0 - 100 mg/dL Final     Triglyceride: No components found for: TRIGLYCERIDE   Weight Management:                 Weight: 196  Height: 66                                   BMI: Body mass index is 31.64 kg/m².  Waist Circumference: 34  inches   Alcohol Use: none Diabetes:No    Last HGBA1C with date if applicable:No components found for: A1C         SOCIAL HISTORY  Social History     Socioeconomic History   • Marital status:      Spouse name: Not on file   • Number of children: Not on file   • Years of education: Not on file   • Highest education level: Not on file   Tobacco Use   • Smoking status: Never Smoker   • Smokeless tobacco: Never Used   Substance and Sexual Activity   • Alcohol use: No   • Drug use: No   • Sexual activity: Not Currently      Comment:        Educational Level (choose one that applies) some college Learning Barriers:Ready to Learn    Family Support:yes    Living Arrangement: lives with their spouse         Tobacco Adjunct: N/A             PSYCHOSOCIAL  Clinical Depression: no    Stress: no     Assess presence or absence of depression using a valid screening tool: yes      PHYSICAL ASSESSMENT    wnl          Angina:  No  No pain anywhere today Diagnosed with Hypertension:yes    Heart Sounds: wnl     Lung Sounds: normal air entry, lungs clear to auscultation         Assessment: wnl Orthopedic Problems: none    Are you being hurt, hit, or frightened by anyone at home or in your life? no    Are you being neglected by a caregiver? N/A        Assessment: wnl    Family attends IA: no Time of arrival: 1310  Time of departure: 1420     Patient Goals: increase strength and stamina         1/27/2020  2:26 PM  Radha Rangel RN

## 2020-01-29 ENCOUNTER — HOSPITAL ENCOUNTER (OUTPATIENT)
Dept: CARDIAC REHAB | Facility: HOSPITAL | Age: 70
Setting detail: THERAPIES SERIES
Discharge: HOME | End: 2020-01-29

## 2020-01-29 ENCOUNTER — APPOINTMENT (OUTPATIENT)
Dept: CARDIAC REHAB | Facility: HOSPITAL | Age: 70
End: 2020-01-29

## 2020-01-29 VITALS — HEART RATE: 70 BPM | DIASTOLIC BLOOD PRESSURE: 75 MMHG | SYSTOLIC BLOOD PRESSURE: 151 MMHG

## 2020-01-29 DIAGNOSIS — Z98.61 POST PTCA: Primary | ICD-10-CM

## 2020-01-29 PROCEDURE — 93798 PHYS/QHP OP CAR RHAB W/ECG: CPT

## 2020-01-30 ENCOUNTER — APPOINTMENT (OUTPATIENT)
Dept: CARDIAC REHAB | Facility: HOSPITAL | Age: 70
End: 2020-01-30

## 2020-01-31 ENCOUNTER — APPOINTMENT (OUTPATIENT)
Dept: CARDIAC REHAB | Facility: HOSPITAL | Age: 70
End: 2020-01-31

## 2020-02-03 ENCOUNTER — APPOINTMENT (OUTPATIENT)
Dept: CARDIAC REHAB | Facility: HOSPITAL | Age: 70
End: 2020-02-03

## 2020-02-04 ENCOUNTER — APPOINTMENT (OUTPATIENT)
Dept: CARDIAC REHAB | Facility: HOSPITAL | Age: 70
End: 2020-02-04

## 2020-02-05 ENCOUNTER — APPOINTMENT (OUTPATIENT)
Dept: CARDIAC REHAB | Facility: HOSPITAL | Age: 70
End: 2020-02-05

## 2020-02-06 ENCOUNTER — APPOINTMENT (OUTPATIENT)
Dept: CARDIAC REHAB | Facility: HOSPITAL | Age: 70
End: 2020-02-06

## 2020-02-07 ENCOUNTER — APPOINTMENT (OUTPATIENT)
Dept: CARDIAC REHAB | Facility: HOSPITAL | Age: 70
End: 2020-02-07

## 2020-02-10 ENCOUNTER — APPOINTMENT (OUTPATIENT)
Dept: CARDIAC REHAB | Facility: HOSPITAL | Age: 70
End: 2020-02-10

## 2020-02-11 ENCOUNTER — APPOINTMENT (OUTPATIENT)
Dept: CARDIAC REHAB | Facility: HOSPITAL | Age: 70
End: 2020-02-11

## 2020-02-12 ENCOUNTER — APPOINTMENT (OUTPATIENT)
Dept: CARDIAC REHAB | Facility: HOSPITAL | Age: 70
End: 2020-02-12

## 2020-02-13 ENCOUNTER — APPOINTMENT (OUTPATIENT)
Dept: CARDIAC REHAB | Facility: HOSPITAL | Age: 70
End: 2020-02-13

## 2020-02-14 ENCOUNTER — APPOINTMENT (OUTPATIENT)
Dept: CARDIAC REHAB | Facility: HOSPITAL | Age: 70
End: 2020-02-14

## 2020-02-17 ENCOUNTER — APPOINTMENT (OUTPATIENT)
Dept: CARDIAC REHAB | Facility: HOSPITAL | Age: 70
End: 2020-02-17

## 2020-02-17 ENCOUNTER — HOSPITAL ENCOUNTER (OUTPATIENT)
Dept: CARDIAC REHAB | Facility: HOSPITAL | Age: 70
Setting detail: THERAPIES SERIES
Discharge: HOME | End: 2020-02-17

## 2020-02-17 VITALS
HEIGHT: 66 IN | WEIGHT: 198 LBS | BODY MASS INDEX: 31.82 KG/M2 | DIASTOLIC BLOOD PRESSURE: 78 MMHG | HEART RATE: 73 BPM | SYSTOLIC BLOOD PRESSURE: 162 MMHG

## 2020-02-17 DIAGNOSIS — Z98.61 POST PTCA: Primary | ICD-10-CM

## 2020-02-18 ENCOUNTER — APPOINTMENT (OUTPATIENT)
Dept: CARDIAC REHAB | Facility: HOSPITAL | Age: 70
End: 2020-02-18

## 2020-02-19 ENCOUNTER — APPOINTMENT (OUTPATIENT)
Dept: CARDIAC REHAB | Facility: HOSPITAL | Age: 70
End: 2020-02-19

## 2020-02-19 ENCOUNTER — HOSPITAL ENCOUNTER (OUTPATIENT)
Dept: CARDIAC REHAB | Facility: HOSPITAL | Age: 70
Setting detail: THERAPIES SERIES
Discharge: HOME | End: 2020-02-19

## 2020-02-19 VITALS — SYSTOLIC BLOOD PRESSURE: 162 MMHG | DIASTOLIC BLOOD PRESSURE: 79 MMHG | HEART RATE: 88 BPM

## 2020-02-19 DIAGNOSIS — Z98.61 POST PTCA: Primary | ICD-10-CM

## 2020-02-19 PROCEDURE — 93798 PHYS/QHP OP CAR RHAB W/ECG: CPT

## 2020-02-20 ENCOUNTER — APPOINTMENT (OUTPATIENT)
Dept: CARDIAC REHAB | Facility: HOSPITAL | Age: 70
End: 2020-02-20

## 2020-02-21 ENCOUNTER — APPOINTMENT (OUTPATIENT)
Dept: CARDIAC REHAB | Facility: HOSPITAL | Age: 70
End: 2020-02-21

## 2020-02-24 ENCOUNTER — APPOINTMENT (OUTPATIENT)
Dept: CARDIAC REHAB | Facility: HOSPITAL | Age: 70
End: 2020-02-24

## 2020-02-25 ENCOUNTER — APPOINTMENT (OUTPATIENT)
Dept: CARDIAC REHAB | Facility: HOSPITAL | Age: 70
End: 2020-02-25

## 2020-02-26 ENCOUNTER — APPOINTMENT (OUTPATIENT)
Dept: CARDIAC REHAB | Facility: HOSPITAL | Age: 70
End: 2020-02-26

## 2020-02-26 ENCOUNTER — HOSPITAL ENCOUNTER (OUTPATIENT)
Dept: CARDIAC REHAB | Facility: HOSPITAL | Age: 70
Setting detail: THERAPIES SERIES
Discharge: HOME | End: 2020-02-26

## 2020-02-26 VITALS
WEIGHT: 195 LBS | HEART RATE: 72 BPM | DIASTOLIC BLOOD PRESSURE: 81 MMHG | SYSTOLIC BLOOD PRESSURE: 172 MMHG | BODY MASS INDEX: 31.34 KG/M2 | HEIGHT: 66 IN

## 2020-02-26 DIAGNOSIS — Z98.61 POST PTCA: Primary | ICD-10-CM

## 2020-02-26 PROCEDURE — 93798 PHYS/QHP OP CAR RHAB W/ECG: CPT

## 2020-02-27 ENCOUNTER — APPOINTMENT (OUTPATIENT)
Dept: CARDIAC REHAB | Facility: HOSPITAL | Age: 70
End: 2020-02-27

## 2020-02-28 ENCOUNTER — HOSPITAL ENCOUNTER (OUTPATIENT)
Dept: CARDIAC REHAB | Facility: HOSPITAL | Age: 70
Setting detail: THERAPIES SERIES
Discharge: HOME | End: 2020-02-28

## 2020-02-28 ENCOUNTER — APPOINTMENT (OUTPATIENT)
Dept: CARDIAC REHAB | Facility: HOSPITAL | Age: 70
End: 2020-02-28

## 2020-02-28 VITALS — SYSTOLIC BLOOD PRESSURE: 158 MMHG | DIASTOLIC BLOOD PRESSURE: 80 MMHG | HEART RATE: 63 BPM

## 2020-02-28 DIAGNOSIS — Z98.61 POST PTCA: Primary | ICD-10-CM

## 2020-02-28 PROCEDURE — 93798 PHYS/QHP OP CAR RHAB W/ECG: CPT

## 2020-03-02 ENCOUNTER — HOSPITAL ENCOUNTER (OUTPATIENT)
Dept: CARDIAC REHAB | Facility: HOSPITAL | Age: 70
Setting detail: THERAPIES SERIES
Discharge: HOME | End: 2020-03-02

## 2020-03-02 ENCOUNTER — APPOINTMENT (OUTPATIENT)
Dept: CARDIAC REHAB | Facility: HOSPITAL | Age: 70
End: 2020-03-02

## 2020-03-02 VITALS
HEART RATE: 70 BPM | BODY MASS INDEX: 31.02 KG/M2 | DIASTOLIC BLOOD PRESSURE: 73 MMHG | WEIGHT: 193 LBS | HEIGHT: 66 IN | SYSTOLIC BLOOD PRESSURE: 145 MMHG

## 2020-03-02 DIAGNOSIS — Z98.61 POST PTCA: Primary | ICD-10-CM

## 2020-03-02 PROCEDURE — 93798 PHYS/QHP OP CAR RHAB W/ECG: CPT

## 2020-03-03 ENCOUNTER — APPOINTMENT (OUTPATIENT)
Dept: CARDIAC REHAB | Facility: HOSPITAL | Age: 70
End: 2020-03-03

## 2020-03-04 ENCOUNTER — APPOINTMENT (OUTPATIENT)
Dept: CARDIAC REHAB | Facility: HOSPITAL | Age: 70
End: 2020-03-04

## 2020-03-05 ENCOUNTER — APPOINTMENT (OUTPATIENT)
Dept: CARDIAC REHAB | Facility: HOSPITAL | Age: 70
End: 2020-03-05

## 2020-03-06 ENCOUNTER — APPOINTMENT (OUTPATIENT)
Dept: CARDIAC REHAB | Facility: HOSPITAL | Age: 70
End: 2020-03-06

## 2020-03-09 ENCOUNTER — APPOINTMENT (OUTPATIENT)
Dept: CARDIAC REHAB | Facility: HOSPITAL | Age: 70
End: 2020-03-09

## 2020-03-09 ENCOUNTER — HOSPITAL ENCOUNTER (OUTPATIENT)
Dept: CARDIAC REHAB | Facility: HOSPITAL | Age: 70
Setting detail: THERAPIES SERIES
Discharge: HOME | End: 2020-03-09

## 2020-03-09 VITALS
SYSTOLIC BLOOD PRESSURE: 169 MMHG | WEIGHT: 193 LBS | HEART RATE: 73 BPM | DIASTOLIC BLOOD PRESSURE: 84 MMHG | BODY MASS INDEX: 31.02 KG/M2 | HEIGHT: 66 IN

## 2020-03-09 DIAGNOSIS — Z98.61 POST PTCA: Primary | ICD-10-CM

## 2020-03-09 PROCEDURE — 93798 PHYS/QHP OP CAR RHAB W/ECG: CPT

## 2020-03-10 ENCOUNTER — OFFICE VISIT (OUTPATIENT)
Dept: CARDIOLOGY | Facility: CLINIC | Age: 70
End: 2020-03-10

## 2020-03-10 VITALS
OXYGEN SATURATION: 98 % | BODY MASS INDEX: 31.76 KG/M2 | HEIGHT: 66 IN | HEART RATE: 72 BPM | WEIGHT: 197.6 LBS | DIASTOLIC BLOOD PRESSURE: 70 MMHG | SYSTOLIC BLOOD PRESSURE: 146 MMHG

## 2020-03-10 DIAGNOSIS — E78.00 PURE HYPERCHOLESTEROLEMIA: ICD-10-CM

## 2020-03-10 DIAGNOSIS — I25.118 CORONARY ARTERY DISEASE OF NATIVE HEART WITH STABLE ANGINA PECTORIS, UNSPECIFIED VESSEL OR LESION TYPE (HCC): Primary | ICD-10-CM

## 2020-03-10 DIAGNOSIS — N18.30 STAGE 3 CHRONIC KIDNEY DISEASE (HCC): ICD-10-CM

## 2020-03-10 DIAGNOSIS — I10 ESSENTIAL HYPERTENSION: ICD-10-CM

## 2020-03-10 PROCEDURE — 99214 OFFICE O/P EST MOD 30 MIN: CPT | Performed by: INTERNAL MEDICINE

## 2020-03-10 RX ORDER — CARVEDILOL 6.25 MG/1
6.25 TABLET ORAL 2 TIMES DAILY
Qty: 180 TABLET | Refills: 3 | Status: SHIPPED | OUTPATIENT
Start: 2020-03-10 | End: 2020-03-10

## 2020-03-10 RX ORDER — CARVEDILOL 6.25 MG/1
6.25 TABLET ORAL 2 TIMES DAILY
Qty: 180 TABLET | Refills: 3 | Status: SHIPPED | OUTPATIENT
Start: 2020-03-10 | End: 2020-08-14 | Stop reason: SDUPTHER

## 2020-03-10 NOTE — PROGRESS NOTES
[facility]  OFFICE NOTE    Cardiovascular Medicine  Karime Moore M.D., RPVI         No referring provider defined for this encounter.    Thank you for asking me to see Kenyadejan Torres for CAD.    History of Present Illness  This is a 69 y.o. male with:    1. Atherosclerosis of native coronary artery of native heart without angina pectoris    2. Presence of aortocoronary bypass graft    3. Essential hypertension    4. Pure hypercholesterolemia          Chief complaint - Follow-up cad        History of present [id number]-old gentleman with history of coronary disease S/P two vessel bypass with LIMA to LAD and vein graft to the right coronary artery,  he cannot be on any statins including Zetia due to intolerance and myalgia.  He was on repatha and his cholesterol is very good, however he he had to stop it because of insurance..  But he could not continue it due to increased cost.    his EKG showed sinus rhythm with LVH.   Was admitted to hospital December 2019 with the NSTEMI, cardiac cath showed patent SVGs however he had severe in-stent restenosis in the circumflex which underwent successful PCI.  Here for routine follow-up.  Denying any chest pain however he shortness of breath on exertion.  NYHA class II-III.    03/10/2020:  No acute issues since last visit.  He took Lasix with improvement in his swelling.  Shortness of breath is improved since he has been around cardiac rehab as well.  He wants labetalol to be [facility] something that [facility].  Tolerating aspirin Plavix 30 without bleeding problems.  He has been started on PCSK9 inhibitor         Review of Systems - ROS  Constitution: Negative for weakness, weight gain and weight loss.   HENT: Negative for congestion.    Eyes: Negative for blurred vision.   Cardiovascular: As mentioned above  Respiratory: Negative for cough and hemoptysis.    Endocrine: Negative for polydipsia and polyuria.    Hematologic/Lymphatic: Negative for bleeding problem. Does not bruise/bleed easily.   Skin: Negative for flushing.   Musculoskeletal: Negative for neck pain and stiffness.   Gastrointestinal: Negative for abdominal pain, diarrhea, jaundice, melena, nausea and vomiting.   Genitourinary: Negative for dysuria and hematuria.   Neurological: Negative for dizziness, focal weakness and numbness.   Psychiatric/Behavioral: Negative for altered mental status and depression.          All other systems were reviewed and were negative.    family history includes Coronary artery disease in his brother, father, and maternal grandfather; Hearing loss in his mother.     reports that he has never smoked. He has never used smokeless tobacco. He reports that he does not drink alcohol or use drugs.    Allergies   Allergen Reactions   • Cefoxitin Anaphylaxis   • Crestor [Rosuvastatin Calcium] Other (See Comments)     Other allergic reaction   • Hydrocodone Itching   • Lipitor [Atorvastatin]    • Pravastatin Other (See Comments)     Other allergic reaction   • Zetia [Ezetimibe] Myalgia   • Zocor [Simvastatin] Other (See Comments)     Other allergic reaction   • Lortab [Hydrocodone-Acetaminophen] Rash         Current Outpatient Medications:   •  amLODIPine (NORVASC) 5 MG tablet, Take 2 tablets by mouth Daily., Disp: 30 tablet, Rfl: 3  •  aspirin 81 MG chewable tablet, Chew 1 tablet Daily., Disp: 30 tablet, Rfl: 0  •  clopidogrel (PLAVIX) 75 MG tablet, TAKE ONE TABLET BY MOUTH EVERY DAY, Disp: 90 tablet, Rfl: 3  •  hydrALAZINE (APRESOLINE) 50 MG tablet, Take 1 tablet by mouth 3 (Three) Times a Day., Disp: 270 tablet, Rfl: 6  •  isosorbide mononitrate (IMDUR) 120 MG 24 hr tablet, Take 1 tablet by mouth Daily., Disp: 30 tablet, Rfl: 0  •  labetalol (NORMODYNE) 100 MG tablet, Take 1 tablet by mouth Every 12 (Twelve) Hours., Disp: 180 tablet, Rfl: 3  •  omeprazole (priLOSEC) 20 MG capsule, Take 20 mg by mouth Daily., Disp: , Rfl:   •   "sertraline (ZOLOFT) 100 MG tablet, Take 100 mg by mouth Daily., Disp: , Rfl:     Physical Exam:  Vitals:    03/10/20 0956   BP: 146/70   BP Location: Left arm   Patient Position: Sitting   Cuff Size: Adult   Pulse: 72   SpO2: 98%   Weight: 89.6 kg (197 lb 9.6 oz)   Height: 167.6 cm (65.98\")   PainSc: 0-No pain     Current Pain Level: none  Pulse Ox: Normal  on room air  General: alert, appears stated age and cooperative     Body Habitus: well-nourished    HEENT: Head: Normocephalic, no lesions, without obvious abnormality. No arcus senilis, xanthelasma or xanthomas.    Neuro: alert, oriented x3  Pulses: 2+ and symmetric  JVP: Volume/Pulsation: Normal.  Normal waveforms.   Appropriate inspiratory decrease.  No Kussmaul's. No Alba's.   Carotid Exam: no bruit normal pulsation bilaterally   Carotid Volume: normal.     Respirations: no increased work of breathing   Chest:  Normal    Pulmonary:Normal   Precordium: Normal impulses. P2 is not palpable.  RV Heave: absent  LV Heave: absent  [place]:  normal size and placement  Palpable S4: absent.  Heart rate: normal    Heart Rhythm: regular     Heart Sounds: S1: normal  S2: normal  S3: absent   S4: absent  Opening Snap: absent    Pericardial Rub:  Absent: .    Abdomen:   Appearance: normal .  Palpation: Soft, non-tender to palpation, bowel sounds positive in all four quadrants; no guarding or rebound tenderness  Extremity: no edema.   LE Skin: no rashes  LE Hair:  normal  LE Pulses: well perfused with normal pulses in the distal extremities  Pallor on elevation: Absent. Rubor on dependency: None      DATA REVIEWED:         ECG/EMG Results (all)     None        ---------------------------------------------------  TTE/JOHN:  Results for orders placed during the hospital encounter of 12/20/19   Adult Transthoracic Echo Complete W/ Cont if Necessary Per Protocol    Narrative · Left ventricular wall thickness is consistent with mild-to-moderate   concentric hypertrophy.  · " Estimated EF = 58%.  · Left ventricular systolic function is normal.  · Left ventricular diastolic dysfunction (grade I) consistent with   impaired relaxation.  · Left atrial cavity size is moderately dilated.  · Mild mitral valve regurgitation is present  · Mild tricuspid valve regurgitation is present.        CT:   No radiology results for the last 30 days.      --------------------------------------------------------------------------------------------------  LABS:     The CVD Risk score (Jose Elias et al., 2008) failed to calculate for the following reasons:    The patient has a prior MI, stroke, CHF, or peripheral vascular disease diagnosis         Lab Results   Component Value Date    GLUCOSE 108 (H) 01/14/2020    GLUCOSE 108 (H) 01/14/2020    BUN 25 (H) 01/14/2020    BUN 25 (H) 01/14/2020    CREATININE 2.18 (H) 01/14/2020    CREATININE 2.10 (H) 01/14/2020    EGFRIFNONA 30 (L) 01/14/2020    EGFRIFNONA 31 (L) 01/14/2020    BCR 11.5 01/14/2020    BCR 11.9 01/14/2020    K 4.0 01/14/2020    K 3.9 01/14/2020    CO2 24.1 01/14/2020    CO2 24.0 01/14/2020    CALCIUM 9.4 01/14/2020    CALCIUM 9.2 01/14/2020    ALBUMIN 4.20 01/14/2020    AST 17 01/14/2020    ALT 12 01/14/2020     Lab Results   Component Value Date    WBC 7.08 12/20/2019    HGB 11.4 (L) 12/20/2019    HCT 33.3 (L) 12/20/2019    MCV 93.8 12/20/2019     12/20/2019     Lab Results   Component Value Date    CHOL 182 01/14/2020    TRIG 92 01/14/2020    HDL 32 (L) 01/14/2020     (H) 01/14/2020     Lab Results   Component Value Date    TSH 1.69 08/01/2019     Lab Results   Component Value Date    CKMB 3.5 12/16/2019    TROPONINI 0.560 (H) 12/20/2019    TROPONINT 0.234 (C) 12/21/2019     Lab Results   Component Value Date    HGBA1C 5.4 04/23/2015     No results found for: DDIMER  Lab Results   Component Value Date    ALT 12 01/14/2020     Lab Results   Component Value Date    HGBA1C 5.4 04/23/2015     Lab Results   Component Value Date     CREATININE 2.18 (H) 01/14/2020    CREATININE 2.10 (H) 01/14/2020     No results found for: IRON, TIBC, FERRITIN  Lab Results   Component Value Date    INR 1.07 12/20/2019    INR 1.5 05/05/2015    INR 1.1 04/23/2015    PROTIME 13.7 12/20/2019    PROTIME 17.6 (H) 05/05/2015    PROTIME 13.7 04/23/2015       Assessment/Plan     1.    Coronary artery disease:  Known history of coronary artery disease status post PCI to OM1 in 2009, proximal RCA in 2011, status post CABG with a LIMA to LAD and a sequential graft to RPL and RPDA in 2015 has presented to the hospital with new onset chest pain.  EKG showed ST depressions in lateral leads.  Trending troponin to 0.2.  Patient was taken to the Cath Lab where he was found to have patent SVG and LIMA.  He had severe in-stent restenosis of stent in the OM1.  For which he underwent successful PCI with a 3.0 x 28 mm Xience Cele stent with excellent angiographic results.  Patient tolerated the procedure well..  Echocardiogram with normal LV function.  Continue aspirin Plavix.     2.  Hypertension:  Better controlled now on current regimen.  He wants his labetalol to be switched to something that we will cover.  We can switch him to carvedilol 6.25 mg.     3.  Hyperlipidemia:  Patient has been allergic to statins.    He has been started on PCSK9 better severe.  His last lipid panel in February showed an LDL of 61 and triglycerides of 85 with a total cholesterol of 112 and HDL of 34.     4.  CKD:  [facility]  [facility]    5.  Shortness of breath: Improved  Factorial from obesity, hypertension, diastolic dysfunction.  I encouraged exercise, weight loss and will be enrolled in cardiac rehab.    PFTs are normal.        Patient's Body mass index is 31.7 kg/m². BMI is above normal parameters. Recommendations include: exercise counseling and nutrition counseling.        Kenya Torres is not a smoker     AAA Screening:   Not  needed          This document has been electronically signed by Karime Moore MD on March 10, 2020 10:31

## 2020-03-11 ENCOUNTER — APPOINTMENT (OUTPATIENT)
Dept: CARDIAC REHAB | Facility: HOSPITAL | Age: 70
End: 2020-03-11

## 2020-03-11 ENCOUNTER — HOSPITAL ENCOUNTER (OUTPATIENT)
Dept: CARDIAC REHAB | Facility: HOSPITAL | Age: 70
Setting detail: THERAPIES SERIES
Discharge: HOME | End: 2020-03-11

## 2020-03-11 VITALS — DIASTOLIC BLOOD PRESSURE: 80 MMHG | HEART RATE: 69 BPM | SYSTOLIC BLOOD PRESSURE: 164 MMHG

## 2020-03-11 DIAGNOSIS — Z98.61 POST PTCA: Primary | ICD-10-CM

## 2020-03-11 PROCEDURE — 93798 PHYS/QHP OP CAR RHAB W/ECG: CPT

## 2020-03-12 ENCOUNTER — APPOINTMENT (OUTPATIENT)
Dept: CARDIAC REHAB | Facility: HOSPITAL | Age: 70
End: 2020-03-12

## 2020-03-13 ENCOUNTER — APPOINTMENT (OUTPATIENT)
Dept: CARDIAC REHAB | Facility: HOSPITAL | Age: 70
End: 2020-03-13

## 2020-03-16 ENCOUNTER — APPOINTMENT (OUTPATIENT)
Dept: CARDIAC REHAB | Facility: HOSPITAL | Age: 70
End: 2020-03-16

## 2020-03-17 ENCOUNTER — APPOINTMENT (OUTPATIENT)
Dept: CARDIAC REHAB | Facility: HOSPITAL | Age: 70
End: 2020-03-17

## 2020-03-18 ENCOUNTER — APPOINTMENT (OUTPATIENT)
Dept: CARDIAC REHAB | Facility: HOSPITAL | Age: 70
End: 2020-03-18

## 2020-03-20 ENCOUNTER — APPOINTMENT (OUTPATIENT)
Dept: CARDIAC REHAB | Facility: HOSPITAL | Age: 70
End: 2020-03-20

## 2020-03-23 ENCOUNTER — APPOINTMENT (OUTPATIENT)
Dept: CARDIAC REHAB | Facility: HOSPITAL | Age: 70
End: 2020-03-23

## 2020-03-25 ENCOUNTER — APPOINTMENT (OUTPATIENT)
Dept: CARDIAC REHAB | Facility: HOSPITAL | Age: 70
End: 2020-03-25

## 2020-03-27 ENCOUNTER — APPOINTMENT (OUTPATIENT)
Dept: CARDIAC REHAB | Facility: HOSPITAL | Age: 70
End: 2020-03-27

## 2020-03-30 ENCOUNTER — APPOINTMENT (OUTPATIENT)
Dept: CARDIAC REHAB | Facility: HOSPITAL | Age: 70
End: 2020-03-30

## 2020-04-01 ENCOUNTER — APPOINTMENT (OUTPATIENT)
Dept: CARDIAC REHAB | Facility: HOSPITAL | Age: 70
End: 2020-04-01

## 2020-04-03 ENCOUNTER — APPOINTMENT (OUTPATIENT)
Dept: CARDIAC REHAB | Facility: HOSPITAL | Age: 70
End: 2020-04-03

## 2020-04-06 ENCOUNTER — APPOINTMENT (OUTPATIENT)
Dept: CARDIAC REHAB | Facility: HOSPITAL | Age: 70
End: 2020-04-06

## 2020-04-08 ENCOUNTER — APPOINTMENT (OUTPATIENT)
Dept: CARDIAC REHAB | Facility: HOSPITAL | Age: 70
End: 2020-04-08

## 2020-04-10 ENCOUNTER — APPOINTMENT (OUTPATIENT)
Dept: CARDIAC REHAB | Facility: HOSPITAL | Age: 70
End: 2020-04-10

## 2020-04-13 ENCOUNTER — APPOINTMENT (OUTPATIENT)
Dept: CARDIAC REHAB | Facility: HOSPITAL | Age: 70
End: 2020-04-13

## 2020-04-15 ENCOUNTER — APPOINTMENT (OUTPATIENT)
Dept: CARDIAC REHAB | Facility: HOSPITAL | Age: 70
End: 2020-04-15

## 2020-04-17 ENCOUNTER — APPOINTMENT (OUTPATIENT)
Dept: CARDIAC REHAB | Facility: HOSPITAL | Age: 70
End: 2020-04-17

## 2020-04-20 ENCOUNTER — APPOINTMENT (OUTPATIENT)
Dept: CARDIAC REHAB | Facility: HOSPITAL | Age: 70
End: 2020-04-20

## 2020-05-29 ENCOUNTER — TELEPHONE (OUTPATIENT)
Dept: CARDIAC REHAB | Facility: HOSPITAL | Age: 70
End: 2020-05-29

## 2020-05-29 NOTE — TELEPHONE ENCOUNTER
I called Mr. Torres to inform him that Cardiac Rehab will be reopening 6/1/2020. I left this on his voicemail. I explained that safety precautions that will be in place and asked him to return my call to let us know of his plans.

## 2020-06-29 ENCOUNTER — TELEPHONE (OUTPATIENT)
Dept: CARDIOLOGY | Facility: CLINIC | Age: 70
End: 2020-06-29

## 2020-08-14 ENCOUNTER — OFFICE VISIT (OUTPATIENT)
Dept: CARDIOLOGY | Facility: CLINIC | Age: 70
End: 2020-08-14

## 2020-08-14 VITALS
HEART RATE: 90 BPM | HEIGHT: 66 IN | OXYGEN SATURATION: 98 % | WEIGHT: 198 LBS | BODY MASS INDEX: 31.82 KG/M2 | SYSTOLIC BLOOD PRESSURE: 160 MMHG | DIASTOLIC BLOOD PRESSURE: 80 MMHG

## 2020-08-14 DIAGNOSIS — I25.118 CORONARY ARTERY DISEASE OF NATIVE HEART WITH STABLE ANGINA PECTORIS, UNSPECIFIED VESSEL OR LESION TYPE (HCC): Primary | Chronic | ICD-10-CM

## 2020-08-14 DIAGNOSIS — E78.00 PURE HYPERCHOLESTEROLEMIA: ICD-10-CM

## 2020-08-14 PROCEDURE — 93000 ELECTROCARDIOGRAM COMPLETE: CPT | Performed by: INTERNAL MEDICINE

## 2020-08-14 PROCEDURE — 99214 OFFICE O/P EST MOD 30 MIN: CPT | Performed by: INTERNAL MEDICINE

## 2020-08-14 RX ORDER — ISOSORBIDE MONONITRATE 120 MG/1
240 TABLET, EXTENDED RELEASE ORAL
Qty: 30 TABLET | Refills: 0 | Status: SHIPPED | OUTPATIENT
Start: 2020-08-14

## 2020-08-14 RX ORDER — LANOLIN ALCOHOL/MO/W.PET/CERES
400 CREAM (GRAM) TOPICAL DAILY
COMMUNITY
End: 2023-03-09 | Stop reason: ALTCHOICE

## 2020-08-14 RX ORDER — ISOSORBIDE MONONITRATE 120 MG/1
240 TABLET, EXTENDED RELEASE ORAL
Qty: 30 TABLET | Refills: 0 | Status: SHIPPED | OUTPATIENT
Start: 2020-08-14 | End: 2020-08-14 | Stop reason: SDUPTHER

## 2020-08-14 RX ORDER — FUROSEMIDE 40 MG/1
40 TABLET ORAL DAILY PRN
COMMUNITY
Start: 2020-03-31 | End: 2021-03-24

## 2020-08-14 RX ORDER — CARVEDILOL 6.25 MG/1
12.5 TABLET ORAL 2 TIMES DAILY
Qty: 180 TABLET | Refills: 3 | Status: SHIPPED | OUTPATIENT
Start: 2020-08-14 | End: 2020-08-19 | Stop reason: DRUGHIGH

## 2020-08-14 RX ORDER — CARVEDILOL 6.25 MG/1
12.5 TABLET ORAL 2 TIMES DAILY
Qty: 180 TABLET | Refills: 3 | Status: SHIPPED | OUTPATIENT
Start: 2020-08-14 | End: 2020-08-14 | Stop reason: SDUPTHER

## 2020-08-14 NOTE — PROGRESS NOTES
[facility] Cardiology  OFFICE NOTE    Cardiovascular Medicine  Karime Moore M.D., RPVI         No referring provider defined for this encounter.    Thank you for asking me to see Kenya Jonah Brian for CAD.    History of Present Illness  This is a 70 y.o. male with:    1. Atherosclerosis of native coronary artery of native heart without angina pectoris    2. Presence of aortocoronary bypass graft    3. Essential hypertension    4. Pure hypercholesterolemia    5. CKD      Chief complaint - Follow-up cad        History of present [id number]old gentleman with history of coronary disease S/P two vessel bypass with LIMA to LAD and vein graft to the right coronary artery,  he cannot be on any statins including Zetia due to intolerance and myalgia.  He was on repatha and his cholesterol is very good, however he he had to stop it because of insurance..  But he could not continue it due to increased cost.    his EKG showed sinus rhythm with LVH.   Was admitted to hospital December 2019 with the NSTEMI, cardiac cath showed patent SVGs however he had severe in-stent restenosis in the circumflex which underwent successful PCI.  Here for routine follow-up.  Denying any chest pain however he shortness of breath on exertion.  NYHA class II-III.    03/10/2020:  No acute issues since last visit.  He took Lasix with improvement in his swelling.  Shortness of breath is improved since he has been around cardiac rehab as well.  He wants labetalol to be switched to something that [facility].  Tolerating aspirin Plavix  without bleeding problems.  He has been started on PCSK9 inhibitor     08/14/2020:  Issues with a tick bites while he was in [place], he is complained of generalized aches and pains he also jumped some chest pain at that time.  He has been treated for his tick bites and subsequently his symptoms are resolved.  He is back to his baseline is perform activities of daily without any  limitations from chest pain.  Blood pressures is running on the higher side.        Review of Systems - ROS  Constitution: Negative for weakness, weight gain and weight loss.   HENT: Negative for congestion.    Eyes: Negative for blurred vision.   Cardiovascular: As mentioned above  Respiratory: Negative for cough and hemoptysis.    Endocrine: Negative for polydipsia and polyuria.   Hematologic/Lymphatic: Negative for bleeding problem. Does not bruise/bleed easily.   Skin: Negative for flushing.   Musculoskeletal: Negative for neck pain and stiffness.   Gastrointestinal: Negative for abdominal pain, diarrhea, jaundice, melena, nausea and vomiting.   Genitourinary: Negative for dysuria and hematuria.   Neurological: Negative for dizziness, focal weakness and numbness.   Psychiatric/Behavioral: Negative for altered mental status and depression.          All other systems were reviewed and were negative.    family history includes Coronary artery disease in his brother, father, and maternal grandfather; Hearing loss in his mother.     reports that he has never smoked. He has never used smokeless tobacco. He reports that he does not drink alcohol or use drugs.    Allergies   Allergen Reactions   • Cefoxitin Anaphylaxis   • Amlodipine Swelling   • Crestor [Rosuvastatin Calcium] Myalgia   • Hydrocodone Itching   • Lipitor [Atorvastatin] Unknown - Low Severity   • Lortab [Hydrocodone-Acetaminophen] Rash   • Pravastatin Myalgia     Other allergic reaction   • Zetia [Ezetimibe] Myalgia   • Zocor [Simvastatin] Myalgia     Other allergic reaction         Current Outpatient Medications:   •  aspirin 81 MG chewable tablet, Chew 1 tablet Daily., Disp: 30 tablet, Rfl: 0  •  carvedilol (COREG) 6.25 MG tablet, Take 1 tablet by mouth 2 (Two) Times a Day., Disp: 180 tablet, Rfl: 3  •  cholecalciferol (VITAMIN D3) 25 MCG (1000 UT) tablet, Take 1,000 Units by mouth Daily., Disp: , Rfl:   •  clopidogrel (PLAVIX) 75 MG tablet, TAKE ONE  "TABLET BY MOUTH EVERY DAY, Disp: 90 tablet, Rfl: 3  •  diphenhydrAMINE HCl, Sleep, (Sleep Aid) 25 MG capsule, Take 1 capsule by mouth Every Night., Disp: , Rfl:   •  folic acid (FOLVITE) 400 MCG tablet, Take 400 mcg by mouth Daily., Disp: , Rfl:   •  furosemide (LASIX) 40 MG tablet, Take 40 mg by mouth Daily As Needed., Disp: , Rfl:   •  hydrALAZINE (APRESOLINE) 50 MG tablet, Take 1 tablet by mouth 3 (Three) Times a Day., Disp: 270 tablet, Rfl: 6  •  isosorbide mononitrate (IMDUR) 120 MG 24 hr tablet, Take 1 tablet by mouth Daily., Disp: 30 tablet, Rfl: 0  •  omeprazole (priLOSEC) 20 MG capsule, Take 20 mg by mouth Daily., Disp: , Rfl:   •  sertraline (ZOLOFT) 100 MG tablet, Take 100 mg by mouth Daily., Disp: , Rfl:     Physical Exam:  Vitals:    08/14/20 0905   BP: 160/80   BP Location: Right arm   Patient Position: Sitting   Cuff Size: Adult   Pulse: 90   SpO2: 98%   Weight: 89.8 kg (198 lb)   Height: 167.6 cm (66\")     Current Pain Level: none  Pulse Ox: Normal  on room air  General: alert, appears stated age and cooperative     Body Habitus: well-nourished    HEENT: Head: Normocephalic, no lesions, without obvious abnormality. No arcus senilis, xanthelasma or xanthomas.    Neuro: alert, oriented x3  Pulses: 2+ and symmetric  JVP: Volume/Pulsation: Normal.  Normal waveforms.   Appropriate inspiratory decrease.  No Kussmaul's. No Alba's.   Carotid Exam: no bruit normal pulsation bilaterally   Carotid Volume: normal.     Respirations: no increased work of breathing   Chest:  Normal    Pulmonary:Normal   Precordium: Normal impulses. P2 is not palpable.  RV Heave: absent  LV Heave: absent  [place]:  normal size and placement  Palpable S4: absent.  Heart rate: normal    Heart Rhythm: regular     Heart Sounds: S1: normal  S2: normal  S3: absent   S4: absent  Opening Snap: absent    Pericardial Rub:  Absent: .    Abdomen:   Appearance: normal .  Palpation: Soft, non-tender to palpation, bowel sounds positive in all " four quadrants; no guarding or rebound tenderness  Extremity: no edema.   LE Skin: no rashes  LE Hair:  normal  LE Pulses: well perfused with normal pulses in the distal extremities  Pallor on elevation: Absent. Rubor on dependency: None      DATA REVIEWED:         ECG/EMG Results (all)     None        ---------------------------------------------------  TTE/JOHN:  Results for orders placed during the hospital encounter of 12/20/19   Adult Transthoracic Echo Complete W/ Cont if Necessary Per Protocol    Narrative · Left ventricular wall thickness is consistent with mild-to-moderate   concentric hypertrophy.  · Estimated EF = 58%.  · Left ventricular systolic function is normal.  · Left ventricular diastolic dysfunction (grade I) consistent with   impaired relaxation.  · Left atrial cavity size is moderately dilated.  · Mild mitral valve regurgitation is present  · Mild tricuspid valve regurgitation is present.        CT:   No radiology results for the last 30 days.      --------------------------------------------------------------------------------------------------  LABS:     The CVD Risk score (Jose Elias et al., 2008) failed to calculate for the following reasons:    The patient has a prior MI, stroke, CHF, or peripheral vascular disease diagnosis         Lab Results   Component Value Date    GLUCOSE 108 (H) 01/14/2020    GLUCOSE 108 (H) 01/14/2020    BUN 25 (H) 01/14/2020    BUN 25 (H) 01/14/2020    CREATININE 2.18 (H) 01/14/2020    CREATININE 2.10 (H) 01/14/2020    EGFRIFNONA 30 (L) 01/14/2020    EGFRIFNONA 31 (L) 01/14/2020    BCR 11.5 01/14/2020    BCR 11.9 01/14/2020    K 4.0 01/14/2020    K 3.9 01/14/2020    CO2 24.1 01/14/2020    CO2 24.0 01/14/2020    CALCIUM 9.4 01/14/2020    CALCIUM 9.2 01/14/2020    ALBUMIN 4.20 01/14/2020    AST 17 01/14/2020    ALT 12 01/14/2020     Lab Results   Component Value Date    WBC 7.08 12/20/2019    HGB 11.4 (L) 12/20/2019    HCT 33.3 (L) 12/20/2019    MCV 93.8 12/20/2019      12/20/2019     Lab Results   Component Value Date    CHOL 182 01/14/2020    TRIG 92 01/14/2020    HDL 32 (L) 01/14/2020     (H) 01/14/2020     Lab Results   Component Value Date    TSH 1.69 08/01/2019     Lab Results   Component Value Date    CKMB 3.5 12/16/2019    TROPONINI <0.015 07/13/2020    TROPONINT 0.234 (C) 12/21/2019     Lab Results   Component Value Date    HGBA1C 5.4 04/23/2015     No results found for: DDIMER  Lab Results   Component Value Date    ALT 12 01/14/2020     Lab Results   Component Value Date    HGBA1C 5.4 04/23/2015     Lab Results   Component Value Date    CREATININE 2.18 (H) 01/14/2020    CREATININE 2.10 (H) 01/14/2020     No results found for: IRON, TIBC, FERRITIN  Lab Results   Component Value Date    INR 1.07 12/20/2019    INR 1.5 05/05/2015    INR 1.1 04/23/2015    PROTIME 13.7 12/20/2019    PROTIME 17.6 (H) 05/05/2015    PROTIME 13.7 04/23/2015       Assessment/Plan     1.    Coronary artery disease:  Known history of coronary artery disease status post PCI to OM1 in 2009, proximal RCA in 2011, status post CABG with a LIMA to LAD and a sequential graft to RPL and RPDA in 2015 has presented to the hospital with new onset chest pain.  EKG showed ST depressions in lateral leads.  Trending troponin to 0.2.  Patient was taken to the Cath Lab where he was found to have patent SVG and LIMA.  He had severe in-stent restenosis of stent in the OM1.  For which he underwent successful PCI with a 3.0 x 28 mm Xience Cele stent with excellent angiographic results.  Patient tolerated the procedure well..  Echocardiogram with normal LV function.  Continue aspirin Plavix.     2.  Hypertension:  On carvedilol 6.25 mg, Imdur 120mg, Hydralazine 50mg TID. Couldn't tolerate amlodipine  Will uptitrate Coreg to 12.5 mg Imdur to 40 mg.     3.  Hyperlipidemia:  Patient has been allergic to statins.    He has been started on PCSK9 better severe.  His last lipid panel in February showed an LDL  of 61 and triglycerides of 85 with a total cholesterol of 112 and HDL of 34.  We will repeat his lipid panel this Monday.     4.  CKD:  [facility]  [facility]    5.  Shortness of breath: Improved  Factorial from obesity, hypertension, diastolic dysfunction.  I encouraged exercise, weight loss and will be enrolled in cardiac rehab.    PFTs are normal.        Patient's Body mass index is 31.7 kg/m². BMI is above normal parameters. Recommendations include: exercise counseling and nutrition counseling.        Kenya Torres is not a smoker     AAA Screening:   Not needed          This document has been electronically signed by Karime Moore MD on August 14, 2020 09:31

## 2020-08-17 ENCOUNTER — LAB (OUTPATIENT)
Dept: LAB | Facility: HOSPITAL | Age: 70
End: 2020-08-17

## 2020-08-17 DIAGNOSIS — E78.00 PURE HYPERCHOLESTEROLEMIA: ICD-10-CM

## 2020-08-17 LAB
CHOLEST SERPL-MCNC: 133 MG/DL (ref 0–200)
HDLC SERPL-MCNC: 33 MG/DL (ref 40–60)
LDLC SERPL CALC-MCNC: 65 MG/DL (ref 0–100)
LDLC/HDLC SERPL: 1.96 {RATIO}
TRIGL SERPL-MCNC: 176 MG/DL (ref 0–150)
VLDLC SERPL-MCNC: 35.2 MG/DL (ref 5–40)

## 2020-08-17 PROCEDURE — 36415 COLL VENOUS BLD VENIPUNCTURE: CPT

## 2020-08-17 PROCEDURE — 80061 LIPID PANEL: CPT

## 2020-08-19 ENCOUNTER — DOCUMENTATION (OUTPATIENT)
Dept: CARDIOLOGY | Facility: CLINIC | Age: 70
End: 2020-08-19

## 2020-08-19 RX ORDER — HYDRALAZINE HYDROCHLORIDE 50 MG/1
75 TABLET, FILM COATED ORAL 3 TIMES DAILY
Qty: 270 TABLET | Refills: 6 | Status: SHIPPED | OUTPATIENT
Start: 2020-08-19

## 2020-08-19 RX ORDER — CARVEDILOL 25 MG/1
25 TABLET ORAL 2 TIMES DAILY
Qty: 60 TABLET | Refills: 11 | Status: SHIPPED | OUTPATIENT
Start: 2020-08-19

## 2020-08-19 NOTE — PROGRESS NOTES
Patient sent a [employer] message with an attached picture showing his blood pressure readings since starting on the increased doses of carvedilol and isosorbide mononitrate on 8/14. His blood pressure readings are as follows:    Sat - 205/99  Sun - (AM before pills) 164/87            (PM) 180/90  Mon - (AM before pills) 196/109             (3pm) 160/90            (10:30) 184/89  Tues - (AM before pills) 172/89             (PM) 180/89  Wed - (AM before pills) 184/89

## 2020-08-20 ENCOUNTER — TELEPHONE (OUTPATIENT)
Dept: CARDIOLOGY | Facility: CLINIC | Age: 70
End: 2020-08-20

## 2020-08-20 NOTE — TELEPHONE ENCOUNTER
Sent in reference to lipid panel  Karime Moore MD Brown, Karen M, RN             Improved.      Left message

## 2020-08-24 ENCOUNTER — TELEPHONE (OUTPATIENT)
Dept: CARDIOLOGY | Facility: CLINIC | Age: 70
End: 2020-08-24

## 2020-08-24 RX ORDER — NIFEDIPINE 30 MG/1
30 TABLET, FILM COATED, EXTENDED RELEASE ORAL DAILY
Qty: 30 TABLET | Refills: 3 | Status: SHIPPED | OUTPATIENT
Start: 2020-08-24 | End: 2023-03-02 | Stop reason: SDUPTHER

## 2020-08-24 NOTE — TELEPHONE ENCOUNTER
Pt called back stating he doesn't like the idea of adding more and more pills. He states he has been seeing a kidney dr. He has taken lisinopril but had to quit because of the kidneys. The medication that was recently doubled up hasn't been working. He stated he does have sleep apnea and uses a CPAP every night. He stated he would like to see Dr Moore as soon as he can to talk further about what all is going on. Made him a f/u for tomorrow.    Here are the BP readings for patient -     Thursday -   7am - 156/86  9:30 - 156/82  Noon - 164/84  10 - 198/95    Friday -   8 - 97/55  10 - 130/70  10pm - 200/92    Saturday -  8 - 187/106  12 - 123/68  9:30 - 175/92    Sunday -   7 - 179/90  5 - 192/90  9 - 196/69    Monday -   8:30 - 200/106

## 2020-08-24 NOTE — TELEPHONE ENCOUNTER
----- Message from Kenya Torres sent at 8/24/2020 11:10 AM CDT -----  Regarding: RE: Prescription Question  Contact: [phone]  Could you call me at your convenience please [phone]

## 2020-08-25 ENCOUNTER — TELEPHONE (OUTPATIENT)
Dept: CARDIOLOGY | Facility: CLINIC | Age: 70
End: 2020-08-25

## 2020-08-25 ENCOUNTER — LAB (OUTPATIENT)
Dept: LAB | Facility: HOSPITAL | Age: 70
End: 2020-08-25

## 2020-08-25 ENCOUNTER — OFFICE VISIT (OUTPATIENT)
Dept: CARDIOLOGY | Facility: CLINIC | Age: 70
End: 2020-08-25

## 2020-08-25 VITALS
OXYGEN SATURATION: 97 % | WEIGHT: 196 LBS | HEART RATE: 77 BPM | SYSTOLIC BLOOD PRESSURE: 170 MMHG | HEIGHT: 66 IN | BODY MASS INDEX: 31.5 KG/M2 | DIASTOLIC BLOOD PRESSURE: 90 MMHG

## 2020-08-25 DIAGNOSIS — I10 ESSENTIAL HYPERTENSION: Primary | ICD-10-CM

## 2020-08-25 DIAGNOSIS — I10 ESSENTIAL HYPERTENSION: ICD-10-CM

## 2020-08-25 LAB
ALBUMIN SERPL-MCNC: 4 G/DL (ref 3.5–5.2)
ALBUMIN/GLOB SERPL: 1.5 G/DL
ALP SERPL-CCNC: 74 U/L (ref 39–117)
ALT SERPL W P-5'-P-CCNC: 12 U/L (ref 1–41)
ANION GAP SERPL CALCULATED.3IONS-SCNC: 12 MMOL/L (ref 5–15)
AST SERPL-CCNC: 16 U/L (ref 1–40)
BILIRUB SERPL-MCNC: 0.4 MG/DL (ref 0–1.2)
BUN SERPL-MCNC: 18 MG/DL (ref 8–23)
BUN/CREAT SERPL: 10.8 (ref 7–25)
CALCIUM SPEC-SCNC: 9.2 MG/DL (ref 8.6–10.5)
CHLORIDE SERPL-SCNC: 109 MMOL/L (ref 98–107)
CO2 SERPL-SCNC: 21 MMOL/L (ref 22–29)
CREAT SERPL-MCNC: 1.66 MG/DL (ref 0.76–1.27)
DEPRECATED RDW RBC AUTO: 48.9 FL (ref 37–54)
ERYTHROCYTE [DISTWIDTH] IN BLOOD BY AUTOMATED COUNT: 14.4 % (ref 12.3–15.4)
GFR SERPL CREATININE-BSD FRML MDRD: 41 ML/MIN/1.73
GLOBULIN UR ELPH-MCNC: 2.6 GM/DL
GLUCOSE SERPL-MCNC: 115 MG/DL (ref 65–99)
HCT VFR BLD AUTO: 36 % (ref 37.5–51)
HGB BLD-MCNC: 11.9 G/DL (ref 13–17.7)
MCH RBC QN AUTO: 30.7 PG (ref 26.6–33)
MCHC RBC AUTO-ENTMCNC: 33.1 G/DL (ref 31.5–35.7)
MCV RBC AUTO: 93 FL (ref 79–97)
PLATELET # BLD AUTO: 208 10*3/MM3 (ref 140–450)
PMV BLD AUTO: 10.2 FL (ref 6–12)
POTASSIUM SERPL-SCNC: 4.1 MMOL/L (ref 3.5–5.2)
PROT SERPL-MCNC: 6.6 G/DL (ref 6–8.5)
RBC # BLD AUTO: 3.87 10*6/MM3 (ref 4.14–5.8)
SODIUM SERPL-SCNC: 142 MMOL/L (ref 136–145)
TSH SERPL DL<=0.05 MIU/L-ACNC: 1.57 UIU/ML (ref 0.27–4.2)
WBC # BLD AUTO: 7.03 10*3/MM3 (ref 3.4–10.8)

## 2020-08-25 PROCEDURE — 80053 COMPREHEN METABOLIC PANEL: CPT | Performed by: INTERNAL MEDICINE

## 2020-08-25 PROCEDURE — 84443 ASSAY THYROID STIM HORMONE: CPT

## 2020-08-25 PROCEDURE — 36415 COLL VENOUS BLD VENIPUNCTURE: CPT | Performed by: INTERNAL MEDICINE

## 2020-08-25 PROCEDURE — 85027 COMPLETE CBC AUTOMATED: CPT | Performed by: INTERNAL MEDICINE

## 2020-08-25 PROCEDURE — 99215 OFFICE O/P EST HI 40 MIN: CPT | Performed by: INTERNAL MEDICINE

## 2020-08-25 NOTE — TELEPHONE ENCOUNTER
Called them back and found out that pt has an appt on 9/14 at 10/15. They asked for me to fax over the office visit note to [phone]

## 2020-08-25 NOTE — TELEPHONE ENCOUNTER
Called [facility] to let them know that Dr Moore wants to Valentin Jacobsen to see pt as soon as possible because his hypertension is not being controlled. Left a  for their office to give me a call back.

## 2020-08-25 NOTE — PROGRESS NOTES
[facility]  OFFICE NOTE    Cardiovascular Medicine  Karime Moore M.D., RPVI         No referring provider defined for this encounter.    Thank you for asking me to see Kenya Jonah Brian for CAD.    History of Present Illness  This is a 70 y.o. male with:    1. Atherosclerosis of native coronary artery of native heart without angina pectoris    2. Presence of aortocoronary bypass graft    3. Essential hypertension    4. Pure hypercholesterolemia    5. CKD      Chief complaint - Follow-up cad        History of present [id number]-old gentleman with history of coronary disease S/P two vessel bypass with LIMA to LAD and vein graft to the right coronary artery,  he cannot be on any statins including Zetia due to intolerance and myalgia.  He was on repatha and his cholesterol is very good, however he he had to stop it because of insurance..  But he could not continue it due to increased cost.    his EKG showed sinus rhythm with LVH.   Was admitted to hospital December 2019 with the NSTEMI, cardiac cath showed patent SVGs however he had severe in-stent restenosis in the circumflex which underwent successful PCI.  Here for routine follow-up.  Denying any chest pain however he shortness of breath on exertion.  NYHA class II-III.    03/10/2020:  No acute issues since last visit.  He took Lasix with improvement in his swelling.  Shortness of breath is improved since he has been around cardiac rehab as well.  He wants labetalol to be [facility].  Tolerating aspirin Plavix  without bleeding problems.  He has been started on PCSK9 inhibitor     08/14/2020:  Issues with a tick bites while he was in [place], he is complained of generalized aches and pains he also jumped some chest pain at that time.  He has been treated for his tick bites and subsequently his symptoms are resolved.  He is back to his baseline is perform activities of daily without any  limitations from chest pain.  Blood pressures is running on the higher side.    08/25/2020:  Patient was seen 2 weeks ago.  However he continued to have issues with hypotension being uncontrolled so here for close follow-up.        Review of Systems - ROS  Constitution: Negative for weakness, weight gain and weight loss.   HENT: Negative for congestion.    Eyes: Negative for blurred vision.   Cardiovascular: As mentioned above  Respiratory: Negative for cough and hemoptysis.    Endocrine: Negative for polydipsia and polyuria.   Hematologic/Lymphatic: Negative for bleeding problem. Does not bruise/bleed easily.   Skin: Negative for flushing.   Musculoskeletal: Negative for neck pain and stiffness.   Gastrointestinal: Negative for abdominal pain, diarrhea, jaundice, melena, nausea and vomiting.   Genitourinary: Negative for dysuria and hematuria.   Neurological: Negative for dizziness, focal weakness and numbness.   Psychiatric/Behavioral: Negative for altered mental status and depression.          All other systems were reviewed and were negative.    family history includes Coronary artery disease in his brother, father, and maternal grandfather; Hearing loss in his mother.     reports that he has never smoked. He has never used smokeless tobacco. He reports that he does not drink alcohol or use drugs.    Allergies   Allergen Reactions   • Cefoxitin Anaphylaxis   • Amlodipine Swelling   • Crestor [Rosuvastatin Calcium] Myalgia   • Hydrocodone Itching   • Lipitor [Atorvastatin] Unknown - Low Severity   • Lortab [Hydrocodone-Acetaminophen] Rash   • Pravastatin Myalgia     Other allergic reaction   • Zetia [Ezetimibe] Myalgia   • Zocor [Simvastatin] Myalgia     Other allergic reaction         Current Outpatient Medications:   •  aspirin 81 MG chewable tablet, Chew 1 tablet Daily., Disp: 30 tablet, Rfl: 0  •  carvedilol (COREG) 25 MG tablet, Take 1 tablet by mouth 2 (Two) Times a Day., Disp: 60 tablet, Rfl: 11  •   "cholecalciferol (VITAMIN D3) 25 MCG (1000 UT) tablet, Take 1,000 Units by mouth Daily., Disp: , Rfl:   •  clopidogrel (PLAVIX) 75 MG tablet, TAKE ONE TABLET BY MOUTH EVERY DAY, Disp: 90 tablet, Rfl: 3  •  diphenhydrAMINE HCl, Sleep, (Sleep Aid) 25 MG capsule, Take 1 capsule by mouth Every Night., Disp: , Rfl:   •  folic acid (FOLVITE) 400 MCG tablet, Take 400 mcg by mouth Daily., Disp: , Rfl:   •  furosemide (LASIX) 40 MG tablet, Take 40 mg by mouth Daily As Needed., Disp: , Rfl:   •  hydrALAZINE (APRESOLINE) 50 MG tablet, Take 1.5 tablets by mouth 3 (Three) Times a Day., Disp: 270 tablet, Rfl: 6  •  isosorbide mononitrate (IMDUR) 120 MG 24 hr tablet, Take 2 tablets by mouth Daily., Disp: 30 tablet, Rfl: 0  •  omeprazole (priLOSEC) 20 MG capsule, Take 20 mg by mouth Daily., Disp: , Rfl:   •  sertraline (ZOLOFT) 100 MG tablet, Take 100 mg by mouth Daily., Disp: , Rfl:   •  NIFEdipine CC (ADALAT CC) 30 MG 24 hr tablet, Take 1 tablet by mouth Daily., Disp: 30 tablet, Rfl: 3    Physical Exam:  Vitals:    08/25/20 0956   BP: 170/90   BP Location: Left arm   Patient Position: Sitting   Cuff Size: Adult   Pulse: 77   SpO2: 97%   Weight: 88.9 kg (196 lb)   Height: 167.6 cm (66\")     Current Pain Level: none  Pulse Ox: Normal  on room air  General: alert, appears stated age and cooperative     Body Habitus: well-nourished    HEENT: Head: Normocephalic, no lesions, without obvious abnormality. No arcus senilis, xanthelasma or xanthomas.    Neuro: alert, oriented x3  Pulses: 2+ and symmetric  JVP: Volume/Pulsation: Normal.  Normal waveforms.   Appropriate inspiratory decrease.  No Kussmaul's. No Alba's.   Carotid Exam: no bruit normal pulsation bilaterally   Carotid Volume: normal.     Respirations: no increased work of breathing   Chest:  Normal    Pulmonary:Normal   Precordium: Normal impulses. P2 is not palpable.  RV Heave: absent  LV Heave: absent  Durant:  normal size and placement  Palpable S4: absent.  Heart rate: " normal    Heart Rhythm: regular     Heart Sounds: S1: normal  S2: normal  S3: absent   S4: absent  Opening Snap: absent    Pericardial Rub:  Absent: .    Abdomen:   Appearance: normal .  Palpation: Soft, non-tender to palpation, bowel sounds positive in all four quadrants; no guarding or rebound tenderness  Extremity: no edema.   LE Skin: no rashes  LE Hair:  normal  LE Pulses: well perfused with normal pulses in the distal extremities  Pallor on elevation: Absent. Rubor on dependency: None      DATA REVIEWED:         ECG/EMG Results (all)     None        ---------------------------------------------------  TTE/JOHN:  Results for orders placed during the hospital encounter of 12/20/19   Adult Transthoracic Echo Complete W/ Cont if Necessary Per Protocol    Narrative · Left ventricular wall thickness is consistent with mild-to-moderate   concentric hypertrophy.  · Estimated EF = 58%.  · Left ventricular systolic function is normal.  · Left ventricular diastolic dysfunction (grade I) consistent with   impaired relaxation.  · Left atrial cavity size is moderately dilated.  · Mild mitral valve regurgitation is present  · Mild tricuspid valve regurgitation is present.        CT:   No radiology results for the last 30 days.      --------------------------------------------------------------------------------------------------  LABS:     The CVD Risk score (Jose Elias et al., 2008) failed to calculate for the following reasons:    The patient has a prior MI, stroke, CHF, or peripheral vascular disease diagnosis         Lab Results   Component Value Date    GLUCOSE 108 (H) 01/14/2020    GLUCOSE 108 (H) 01/14/2020    BUN 25 (H) 01/14/2020    BUN 25 (H) 01/14/2020    CREATININE 2.18 (H) 01/14/2020    CREATININE 2.10 (H) 01/14/2020    EGFRIFNONA 30 (L) 01/14/2020    EGFRIFNONA 31 (L) 01/14/2020    BCR 11.5 01/14/2020    BCR 11.9 01/14/2020    K 4.0 01/14/2020    K 3.9 01/14/2020    CO2 24.1 01/14/2020    CO2 24.0 01/14/2020     CALCIUM 9.4 01/14/2020    CALCIUM 9.2 01/14/2020    ALBUMIN 4.20 01/14/2020    AST 17 01/14/2020    ALT 12 01/14/2020     Lab Results   Component Value Date    WBC 7.08 12/20/2019    HGB 11.4 (L) 12/20/2019    HCT 33.3 (L) 12/20/2019    MCV 93.8 12/20/2019     12/20/2019     Lab Results   Component Value Date    CHOL 133 08/17/2020    TRIG 176 (H) 08/17/2020    HDL 33 (L) 08/17/2020    LDL 65 08/17/2020     Lab Results   Component Value Date    TSH 1.69 08/01/2019     Lab Results   Component Value Date    CKMB 3.5 12/16/2019    TROPONINI <0.015 07/13/2020    TROPONINT 0.234 (C) 12/21/2019     Lab Results   Component Value Date    HGBA1C 5.4 04/23/2015     No results found for: DDIMER  Lab Results   Component Value Date    ALT 12 01/14/2020     Lab Results   Component Value Date    HGBA1C 5.4 04/23/2015     Lab Results   Component Value Date    CREATININE 2.18 (H) 01/14/2020    CREATININE 2.10 (H) 01/14/2020     No results found for: IRON, TIBC, FERRITIN  Lab Results   Component Value Date    INR 1.07 12/20/2019    INR 1.5 05/05/2015    INR 1.1 04/23/2015    PROTIME 13.7 12/20/2019    PROTIME 17.6 (H) 05/05/2015    PROTIME 13.7 04/23/2015       Assessment/Plan     1.    Coronary artery disease:  Known history of coronary artery disease status post PCI to OM1 in 2009, proximal RCA in 2011, status post CABG with a LIMA to LAD and a sequential graft to RPL and RPDA in 2015 has presented to the hospital with new onset chest pain.  EKG showed ST depressions in lateral leads.  Trending troponin to 0.2.  Patient was taken to the Cath Lab where he was found to have patent SVG and LIMA.  He had severe in-stent restenosis of stent in the OM1.  For which he underwent successful PCI with a 3.0 x 28 mm Xience Cele stent with excellent angiographic results.  Patient tolerated the procedure well..  Echocardiogram with normal LV function.  Continue aspirin Plavix.     2.  Hypertension: Uncontrolled  On carvedilol 25 mg,  Imdur 2400mg, Hydralazine 75mg TID. Couldn't tolerate amlodipine  He does have CKD.  We will check his BMP  Arterial ultrasound for renal artery stenosis  I am adding nifedipine 30 mg.  We will have him come in a week for blood pressure check.  Refer to nephrology.     3.  Hyperlipidemia:   Patient has been allergic to statins.    He has been started on PCSK9 better severe.  His last lipid panel in February showed an LDL of 61 and triglycerides of 85 with a total cholesterol of 112 and HDL of 34.  We will repeat his lipid panel this Monday.     4.  CKD:  [facility]  [facility]    5.  Shortness of breath: Improved  Factorial from obesity, hypertension, diastolic dysfunction.  I encouraged exercise, weight loss and will be enrolled in cardiac rehab.    PFTs are normal.        Patient's Body mass index is 31.7 kg/m². BMI is above normal parameters. Recommendations include: exercise counseling and nutrition counseling.        Kenya Torres is not a smoker     AAA Screening:   Not needed          This document has been electronically signed by Karime Moore MD on August 25, 2020 10:12

## 2020-08-27 DIAGNOSIS — N18.30 STAGE 3 CHRONIC KIDNEY DISEASE (HCC): Primary | ICD-10-CM

## 2020-08-27 NOTE — PROGRESS NOTES
In reference to CMP results  Karime Moore MD Brown, Karen M RN             Cr has actually improved. He needs to see nephrology so we can get him started on ACE and spironolactone.      Patient notified of results. Referral to nephrology placed

## 2020-09-01 DIAGNOSIS — I10 ESSENTIAL HYPERTENSION: Primary | ICD-10-CM

## 2020-09-02 ENCOUNTER — OFFICE VISIT (OUTPATIENT)
Dept: CARDIOLOGY | Facility: CLINIC | Age: 70
End: 2020-09-02

## 2020-09-02 ENCOUNTER — TELEPHONE (OUTPATIENT)
Dept: CARDIOLOGY | Facility: CLINIC | Age: 70
End: 2020-09-02

## 2020-09-02 ENCOUNTER — DOCUMENTATION (OUTPATIENT)
Dept: CARDIOLOGY | Facility: CLINIC | Age: 70
End: 2020-09-02

## 2020-09-02 VITALS — SYSTOLIC BLOOD PRESSURE: 135 MMHG | DIASTOLIC BLOOD PRESSURE: 80 MMHG

## 2020-09-02 DIAGNOSIS — I10 ESSENTIAL HYPERTENSION: Primary | ICD-10-CM

## 2020-09-02 DIAGNOSIS — E78.00 PURE HYPERCHOLESTEROLEMIA: ICD-10-CM

## 2020-09-02 DIAGNOSIS — N18.30 STAGE 3 CHRONIC KIDNEY DISEASE (HCC): ICD-10-CM

## 2020-09-02 DIAGNOSIS — I25.118 CORONARY ARTERY DISEASE OF NATIVE HEART WITH STABLE ANGINA PECTORIS, UNSPECIFIED VESSEL OR LESION TYPE (HCC): ICD-10-CM

## 2020-09-02 LAB
BH CV ECHO MEAS - DIST REN A EDV LEFT: 16 CM/S
BH CV ECHO MEAS - DIST REN A PSV LEFT: 91 CM/S
BH CV ECHO MEAS - MID REN A EDV LEFT: 25 CM/S
BH CV ECHO MEAS - MID REN A PSV LEFT: 75 CM/S
BH CV ECHO MEAS - PROX REN A EDV LEFT: 44 CM/S
BH CV ECHO MEAS - PROX REN A PSV LEFT: 317 CM/S
BH CV VAS RENAL AORTIC MID EDV: 1.5 CM/S
BH CV VAS RENAL AORTIC MID PSV: 104 CM/S
BH CV VAS RENAL HILUM LEFT EDV: 6 CM/S
BH CV VAS RENAL HILUM LEFT PSV: 18 CM/S
BH CV VAS RENAL HILUM RIGHT EDV: 12 CM/S
BH CV VAS RENAL HILUM RIGHT PSV: 35 CM/S
BH CV XLRA MEAS - KID L LEFT: 11.4 CM
BH CV XLRA MEAS DIST REN A EDV RIGHT: 19 CM/S
BH CV XLRA MEAS DIST REN A PSV RIGHT: 61 CM/S
BH CV XLRA MEAS KID L RIGHT: 10.2 CM
BH CV XLRA MEAS MID REN A EDV RIGHT: 17 CM/S
BH CV XLRA MEAS MID REN A PSV RIGHT: 70 CM/S
BH CV XLRA MEAS PROX REN A EDV RIGHT: 58 CM/S
BH CV XLRA MEAS PROX REN A PSV RIGHT: 227 CM/S
BH CV XLRA MEAS RAR LEFT: 0.88
BH CV XLRA MEAS RAR RIGHT: 0.67
LEFT RENAL UPPER PARENCHYMA MAX: 12 CM/S
LEFT RENAL UPPER PARENCHYMA MIN: 5 CM/S
LEFT RENAL UPPER PARENCHYMA RI: 0.58
RIGHT RENAL UPPER PARENCHYMA MAX: 25 CM/S
RIGHT RENAL UPPER PARENCHYMA MIN: 11 CM/S
RIGHT RENAL UPPER PARENCHYMA RI: 0.56

## 2020-09-02 PROCEDURE — 99214 OFFICE O/P EST MOD 30 MIN: CPT | Performed by: INTERNAL MEDICINE

## 2020-09-02 NOTE — PROGRESS NOTES
[facility]  OFFICE NOTE    Cardiovascular Medicine  Karime Moore M.D., RPVI         No referring provider defined for this encounter.    Thank you for asking me to see Kenya Jonah Brian for CAD.    History of Present Illness  This is a 70 y.o. male with:    1. Atherosclerosis of native coronary artery of native heart without angina pectoris    2. Presence of aortocoronary bypass graft    3. Essential hypertension    4. Pure hypercholesterolemia    5. CKD      Chief complaint - Follow-up cad        History of present [id number]-old gentleman with history of coronary disease S/P two vessel bypass with LIMA to LAD and vein graft to the right coronary artery,  he cannot be on any statins including Zetia due to intolerance and myalgia.  He was on repatha and his cholesterol is very good, however he he had to stop it because of insurance..  But he could not continue it due to increased cost.    his EKG showed sinus rhythm with LVH.   Was admitted to hospital December 2019 with the NSTEMI, cardiac cath showed patent SVGs however he had severe in-stent restenosis in the circumflex which underwent successful PCI.  Here for routine follow-up.  Denying any chest pain however he shortness of breath on exertion.  NYHA class II-III.    03/10/2020:  No acute issues since last visit.  He took Lasix with improvement in his swelling.  Shortness of breath is improved since he has been around cardiac rehab as well.  He wants labetalol to be [facility] to something that [facility].  Tolerating aspirin Plavix  without bleeding problems.  He has been started on PCSK9 inhibitor     08/14/2020:  Issues with a tick bites while he was in [place], he is complained of generalized aches and pains he also jumped some chest pain at that time.  He has been treated for his tick bites and subsequently his symptoms are resolved.  He is back to his baseline is perform activities of daily without any  limitations from chest pain.  Blood pressures is running on the higher side.    08/25/2020:  Patient was seen 2 weeks ago.  However he continued to have issues with hypotension being uncontrolled so here for close follow-up.    09/02/2020:  Here for close follow-up for hypertension.  Blood pressure is improved in office today.  However at home he still reported spikes at 160s.  However he is not very compliant and misses his dosages.        Review of Systems - ROS  Constitution: Negative for weakness, weight gain and weight loss.   HENT: Negative for congestion.    Eyes: Negative for blurred vision.   Cardiovascular: As mentioned above  Respiratory: Negative for cough and hemoptysis.    Endocrine: Negative for polydipsia and polyuria.   Hematologic/Lymphatic: Negative for bleeding problem. Does not bruise/bleed easily.   Skin: Negative for flushing.   Musculoskeletal: Negative for neck pain and stiffness.   Gastrointestinal: Negative for abdominal pain, diarrhea, jaundice, melena, nausea and vomiting.   Genitourinary: Negative for dysuria and hematuria.   Neurological: Negative for dizziness, focal weakness and numbness.   Psychiatric/Behavioral: Negative for altered mental status and depression.          All other systems were reviewed and were negative.    family history includes Coronary artery disease in his brother, father, and maternal grandfather; Hearing loss in his mother.     reports that he has never smoked. He has never used smokeless tobacco. He reports that he does not drink alcohol or use drugs.    Allergies   Allergen Reactions   • Cefoxitin Anaphylaxis   • Amlodipine Swelling   • Crestor [Rosuvastatin Calcium] Myalgia   • Hydrocodone Itching   • Lipitor [Atorvastatin] Unknown - Low Severity   • Lortab [Hydrocodone-Acetaminophen] Rash   • Pravastatin Myalgia     Other allergic reaction   • Zetia [Ezetimibe] Myalgia   • Zocor [Simvastatin] Myalgia     Other allergic reaction         Current Outpatient  Medications:   •  aspirin 81 MG chewable tablet, Chew 1 tablet Daily., Disp: 30 tablet, Rfl: 0  •  carvedilol (COREG) 25 MG tablet, Take 1 tablet by mouth 2 (Two) Times a Day., Disp: 60 tablet, Rfl: 11  •  cholecalciferol (VITAMIN D3) 25 MCG (1000 UT) tablet, Take 1,000 Units by mouth Daily., Disp: , Rfl:   •  clopidogrel (PLAVIX) 75 MG tablet, TAKE ONE TABLET BY MOUTH EVERY DAY, Disp: 90 tablet, Rfl: 3  •  diphenhydrAMINE HCl, Sleep, (Sleep Aid) 25 MG capsule, Take 1 capsule by mouth Every Night., Disp: , Rfl:   •  folic acid (FOLVITE) 400 MCG tablet, Take 400 mcg by mouth Daily., Disp: , Rfl:   •  furosemide (LASIX) 40 MG tablet, Take 40 mg by mouth Daily As Needed., Disp: , Rfl:   •  hydrALAZINE (APRESOLINE) 50 MG tablet, Take 1.5 tablets by mouth 3 (Three) Times a Day., Disp: 270 tablet, Rfl: 6  •  isosorbide mononitrate (IMDUR) 120 MG 24 hr tablet, Take 2 tablets by mouth Daily., Disp: 30 tablet, Rfl: 0  •  NIFEdipine CC (ADALAT CC) 30 MG 24 hr tablet, Take 1 tablet by mouth Daily., Disp: 30 tablet, Rfl: 3  •  omeprazole (priLOSEC) 20 MG capsule, Take 20 mg by mouth Daily., Disp: , Rfl:   •  sertraline (ZOLOFT) 100 MG tablet, Take 100 mg by mouth Daily., Disp: , Rfl:     Physical Exam:  Vitals:    09/02/20 0917 09/02/20 0918   BP: 130/70 135/80   BP Location: Left arm Right arm   Patient Position: Sitting Sitting   Cuff Size: Adult Adult     Current Pain Level: none  Pulse Ox: Normal  on room air  General: alert, appears stated age and cooperative     Body Habitus: well-nourished    HEENT: Head: Normocephalic, no lesions, without obvious abnormality. No arcus senilis, xanthelasma or xanthomas.    Neuro: alert, oriented x3  Pulses: 2+ and symmetric  JVP: Volume/Pulsation: Normal.  Normal waveforms.   Appropriate inspiratory decrease.  No Kussmaul's. No Alba's.   Carotid Exam: no bruit normal pulsation bilaterally   Carotid Volume: normal.     Respirations: no increased work of breathing   Chest:  Normal     Pulmonary:Normal   Precordium: Normal impulses. P2 is not palpable.  RV Heave: absent  LV Heave: absent  Ault:  normal size and placement  Palpable S4: absent.  Heart rate: normal    Heart Rhythm: regular     Heart Sounds: S1: normal  S2: normal  S3: absent   S4: absent  Opening Snap: absent    Pericardial Rub:  Absent: .    Abdomen:   Appearance: normal .  Palpation: Soft, non-tender to palpation, bowel sounds positive in all four quadrants; no guarding or rebound tenderness  Extremity: no edema.   LE Skin: no rashes  LE Hair:  normal  LE Pulses: well perfused with normal pulses in the distal extremities  Pallor on elevation: Absent. Rubor on dependency: None      DATA REVIEWED:         ECG/EMG Results (all)     None        ---------------------------------------------------  TTE/JOHN:  Results for orders placed during the hospital encounter of 12/20/19   Adult Transthoracic Echo Complete W/ Cont if Necessary Per Protocol    Narrative · Left ventricular wall thickness is consistent with mild-to-moderate   concentric hypertrophy.  · Estimated EF = 58%.  · Left ventricular systolic function is normal.  · Left ventricular diastolic dysfunction (grade I) consistent with   impaired relaxation.  · Left atrial cavity size is moderately dilated.  · Mild mitral valve regurgitation is present  · Mild tricuspid valve regurgitation is present.        CT:   No radiology results for the last 30 days.      --------------------------------------------------------------------------------------------------  LABS:     The CVD Risk score (Jose Elias et al., 2008) failed to calculate for the following reasons:    The patient has a prior MI, stroke, CHF, or peripheral vascular disease diagnosis         Lab Results   Component Value Date    GLUCOSE 115 (H) 08/25/2020    BUN 18 08/25/2020    CREATININE 1.66 (H) 08/25/2020    EGFRIFNONA 41 (L) 08/25/2020    BCR 10.8 08/25/2020    K 4.1 08/25/2020    CO2 21.0 (L) 08/25/2020    CALCIUM 9.2  08/25/2020    ALBUMIN 4.00 08/25/2020    AST 16 08/25/2020    ALT 12 08/25/2020     Lab Results   Component Value Date    WBC 7.03 08/25/2020    HGB 11.9 (L) 08/25/2020    HCT 36.0 (L) 08/25/2020    MCV 93.0 08/25/2020     08/25/2020     Lab Results   Component Value Date    CHOL 133 08/17/2020    TRIG 176 (H) 08/17/2020    HDL 33 (L) 08/17/2020    LDL 65 08/17/2020     Lab Results   Component Value Date    TSH 1.570 08/25/2020     Lab Results   Component Value Date    CKMB 3.5 12/16/2019    TROPONINI <0.015 07/13/2020    TROPONINT 0.234 (C) 12/21/2019     Lab Results   Component Value Date    HGBA1C 5.4 04/23/2015     No results found for: DDIMER  Lab Results   Component Value Date    ALT 12 08/25/2020     Lab Results   Component Value Date    HGBA1C 5.4 04/23/2015     Lab Results   Component Value Date    CREATININE 1.66 (H) 08/25/2020     No results found for: IRON, TIBC, FERRITIN  Lab Results   Component Value Date    INR 1.07 12/20/2019    INR 1.5 05/05/2015    INR 1.1 04/23/2015    PROTIME 13.7 12/20/2019    PROTIME 17.6 (H) 05/05/2015    PROTIME 13.7 04/23/2015       Assessment/Plan     1.    Coronary artery disease:  Known history of coronary artery disease status post PCI to OM1 in 2009, proximal RCA in 2011, status post CABG with a LIMA to LAD and a sequential graft to RPL and RPDA in 2015 has presented to the hospital with new onset chest pain.  EKG showed ST depressions in lateral leads.  Trending troponin to 0.2.  Patient was taken to the Cath Lab where he was found to have patent SVG and LIMA.  He had severe in-stent restenosis of stent in the OM1.  For which he underwent successful PCI with a 3.0 x 28 mm Xience Cele stent with excellent angiographic results.  Patient tolerated the procedure well..  Echocardiogram with normal LV function.  Continue aspirin Plavix.     2.  Hypertension: Better controlled  On carvedilol 25 mg, Imdur 240mg, Hydralazine 75mg TID. Couldn't tolerate amlodipine.   I  added nifedipine last visit his blood pressure is improved  He does have CKD.  Repeat BMP was actually improvement in his creatinine down from 2-1.6  Arterial ultrasound for renal artery stenosis pending  Reiterated importance of compliance with medications  He also has an appointment coming up with nephrology, will see if we can start him on an ACE or spironolactone setting of his CKD     3.  Hyperlipidemia:   Patient has been allergic to statins.    He has been started on PCSK9 better severe.  His last lipid panel in February showed an LDL of 61 and triglycerides of 85 with a total cholesterol of 112 and HDL of 34.  We will repeat his lipid panel this Monday.     4.  CKD:  [facility]  [facility]    5.  Shortness of breath: Improved  Factorial from obesity, hypertension, diastolic dysfunction.  I encouraged exercise, weight loss and will be enrolled in cardiac rehab.    PFTs are normal.        Patient's Body mass index is 31.7 kg/m². BMI is above normal parameters. Recommendations include: exercise counseling and nutrition counseling.        Kenya Torres is not a smoker     AAA Screening:   Not needed          This document has been electronically signed by Karime Moore MD on September 2, 2020 09:25

## 2020-09-03 ENCOUNTER — DOCUMENTATION (OUTPATIENT)
Dept: CARDIOLOGY | Facility: CLINIC | Age: 70
End: 2020-09-03

## 2020-09-03 NOTE — PROGRESS NOTES
From: Ivon Álvarez RN   Sent: 9/1/2020   5:16 PM CDT   To: Karime Moore MD     Next appt is 9/14 with Dr Medina   ----- Message -----   From: Karime Moore MD   Sent: 8/25/2020   3:13 PM CDT   To: Ivon Álvarez RN     Cr has actually improved. He needs to see nephrology so we can get him started on ACE and spironolactone.     ----- Message -----   From: Lab, Background User   Sent: 8/25/2020   2:22 PM CDT   To: Karime Moore MD

## 2020-09-03 NOTE — TELEPHONE ENCOUNTER
Pt sent a EasyLink message with an attachment stating what he was on. He said he is currently on:    Carvedilol 25 mg, BID  Zoloft 100 mg daily  Agizsk15 mg daily  Imdur 120 mg BID  Hydralazine 75 mg, TID  Nifedipine 30 mg daily  Aspirin 81 mg daily  Vit D daily  Folic acid daily  Lasix 40 mg prn  Prilosec 20 mg daily  Sleeping pill nightly  Alirocumab 75 mg every 2 weeks

## 2020-09-04 ENCOUNTER — LAB (OUTPATIENT)
Dept: LAB | Facility: HOSPITAL | Age: 70
End: 2020-09-04

## 2020-09-04 DIAGNOSIS — I10 ESSENTIAL HYPERTENSION: ICD-10-CM

## 2020-09-04 DIAGNOSIS — N18.30 STAGE 3 CHRONIC KIDNEY DISEASE (HCC): Primary | ICD-10-CM

## 2020-09-04 PROCEDURE — 84585 ASSAY OF URINE VMA: CPT

## 2020-09-04 PROCEDURE — 81050 URINALYSIS VOLUME MEASURE: CPT

## 2020-09-09 ENCOUNTER — DOCUMENTATION (OUTPATIENT)
Dept: CARDIOLOGY | Facility: CLINIC | Age: 70
End: 2020-09-09

## 2020-09-09 NOTE — PROGRESS NOTES
Patient given date time and instructions  for renal ultrasound NPO after midnight. Patient verbalized understanding

## 2020-09-11 LAB
VMA 24H UR-MRATE: 4.6 MG/24 HR (ref 0–7.5)
VMA UR-MCNC: 3.4 MG/L

## 2020-09-14 ENCOUNTER — TRANSCRIBE ORDERS (OUTPATIENT)
Dept: LAB | Facility: HOSPITAL | Age: 70
End: 2020-09-14

## 2020-09-14 ENCOUNTER — LAB (OUTPATIENT)
Dept: LAB | Facility: HOSPITAL | Age: 70
End: 2020-09-14

## 2020-09-14 DIAGNOSIS — I25.10 CORONARY ARTERIOSCLEROSIS: ICD-10-CM

## 2020-09-14 DIAGNOSIS — I10 ESSENTIAL (PRIMARY) HYPERTENSION: ICD-10-CM

## 2020-09-14 DIAGNOSIS — N18.30 CHRONIC KIDNEY DISEASE, STAGE III (MODERATE) (HCC): ICD-10-CM

## 2020-09-14 DIAGNOSIS — N18.30 CHRONIC KIDNEY DISEASE, STAGE III (MODERATE) (HCC): Primary | ICD-10-CM

## 2020-09-14 LAB
25(OH)D3 SERPL-MCNC: 35 NG/ML (ref 30–100)
ALBUMIN SERPL-MCNC: 4.1 G/DL (ref 3.5–5.2)
ANION GAP SERPL CALCULATED.3IONS-SCNC: 11.6 MMOL/L (ref 5–15)
BUN SERPL-MCNC: 21 MG/DL (ref 8–23)
BUN/CREAT SERPL: 10.6 (ref 7–25)
CALCIUM SPEC-SCNC: 9.2 MG/DL (ref 8.6–10.5)
CHLORIDE SERPL-SCNC: 111 MMOL/L (ref 98–107)
CO2 SERPL-SCNC: 20.4 MMOL/L (ref 22–29)
CREAT SERPL-MCNC: 1.99 MG/DL (ref 0.76–1.27)
CREAT UR-MCNC: 260 MG/DL
GFR SERPL CREATININE-BSD FRML MDRD: 33 ML/MIN/1.73
GLUCOSE SERPL-MCNC: 112 MG/DL (ref 65–99)
MAGNESIUM SERPL-MCNC: 2 MG/DL (ref 1.6–2.4)
PHOSPHATE SERPL-MCNC: 3.1 MG/DL (ref 2.5–4.5)
POTASSIUM SERPL-SCNC: 4.1 MMOL/L (ref 3.5–5.2)
PROT UR-MCNC: 51 MG/DL
PROT/CREAT UR: 196.2 MG/G CREA (ref 0–200)
PTH-INTACT SERPL-MCNC: 46.6 PG/ML (ref 15–65)
SODIUM SERPL-SCNC: 143 MMOL/L (ref 136–145)

## 2020-09-14 PROCEDURE — 83735 ASSAY OF MAGNESIUM: CPT

## 2020-09-14 PROCEDURE — 80069 RENAL FUNCTION PANEL: CPT

## 2020-09-14 PROCEDURE — 82306 VITAMIN D 25 HYDROXY: CPT

## 2020-09-14 PROCEDURE — 36415 COLL VENOUS BLD VENIPUNCTURE: CPT

## 2020-09-14 PROCEDURE — 83970 ASSAY OF PARATHORMONE: CPT

## 2020-09-14 PROCEDURE — 82570 ASSAY OF URINE CREATININE: CPT | Performed by: NURSE PRACTITIONER

## 2020-09-14 PROCEDURE — 84156 ASSAY OF PROTEIN URINE: CPT | Performed by: NURSE PRACTITIONER

## 2020-09-22 ENCOUNTER — HOSPITAL ENCOUNTER (OUTPATIENT)
Dept: ULTRASOUND IMAGING | Facility: HOSPITAL | Age: 70
Discharge: HOME | End: 2020-09-22
Admitting: INTERNAL MEDICINE

## 2020-09-22 DIAGNOSIS — N18.30 STAGE 3 CHRONIC KIDNEY DISEASE (HCC): ICD-10-CM

## 2020-09-22 PROCEDURE — 76775 US EXAM ABDO BACK WALL LIM: CPT

## 2020-11-24 ENCOUNTER — OFFICE VISIT (OUTPATIENT)
Dept: CARDIOLOGY | Facility: CLINIC | Age: 70
End: 2020-11-24

## 2020-11-24 VITALS
HEART RATE: 68 BPM | OXYGEN SATURATION: 98 % | BODY MASS INDEX: 31.92 KG/M2 | WEIGHT: 198.6 LBS | SYSTOLIC BLOOD PRESSURE: 130 MMHG | HEIGHT: 66 IN | DIASTOLIC BLOOD PRESSURE: 60 MMHG

## 2020-11-24 DIAGNOSIS — I10 HYPERTENSION, ESSENTIAL: Primary | ICD-10-CM

## 2020-11-24 DIAGNOSIS — E78.2 HYPERLIPEMIA, MIXED: ICD-10-CM

## 2020-11-24 PROCEDURE — 99214 OFFICE O/P EST MOD 30 MIN: CPT | Performed by: INTERNAL MEDICINE

## 2020-11-24 RX ORDER — NIFEDIPINE 30 MG/1
30 TABLET, FILM COATED, EXTENDED RELEASE ORAL
COMMUNITY
Start: 2020-08-24 | End: 2020-11-24

## 2020-11-24 RX ORDER — SODIUM BICARBONATE 650 MG/1
TABLET ORAL
COMMUNITY
Start: 2020-09-15

## 2020-11-24 NOTE — PROGRESS NOTES
[facility]  OFFICE NOTE    Cardiovascular Medicine  Karime Moore M.D., RPVI         No referring provider defined for this encounter.    Thank you for asking me to see Kenya Jonah Brian for CAD.    History of Present Illness  This is a 70 y.o. male with:    1. Atherosclerosis of native coronary artery of native heart without angina pectoris    2. Presence of aortocoronary bypass graft    3. Essential hypertension    4. Pure hypercholesterolemia    5. CKD      Chief complaint - Follow-up cad        History of present [id number] gentleman with history of coronary disease S/P two vessel bypass with LIMA to LAD and vein graft to the right coronary artery,  he cannot be on any statins including Zetia due to intolerance and myalgia.  He was on repatha and his cholesterol is very good, however he he had to stop it because of insurance..  But he could not continue it due to increased cost.    his EKG showed sinus rhythm with LVH.   Was admitted to hospital December 2019 with the NSTEMI, cardiac cath showed patent SVGs however he had severe in-stent restenosis in the circumflex which underwent successful PCI.  Here for routine follow-up.  Denying any chest pain however he shortness of breath on exertion.  NYHA class II-III.    03/10/2020:  No acute issues since last visit.  He took Lasix with improvement in his swelling.  Shortness of breath is improved since he has been around cardiac rehab as well.  He wants labetalol to be switched to something that [facility].  Tolerating aspirin Plavix  without bleeding problems.  He has been started on PCSK9 inhibitor     08/14/2020:  Issues with a tick bites while he was in [place], he is complained of generalized aches and pains he also jumped some chest pain at that time.  He has been treated for his tick bites and subsequently his symptoms are resolved.  He is back to his baseline is perform activities of daily without any  limitations from chest pain.  Blood pressures is running on the higher side.    08/25/2020:  Patient was seen 2 weeks ago.  However he continued to have issues with hypotension being uncontrolled so here for close follow-up.    09/02/2020:  Here for close follow-up for hypertension.  Blood pressure is improved in office today.  However at home he still reported spikes at 160s.  However he is not very compliant and misses his dosages.    11/24/2020:  No acute issues since last visit blood pressure has been well controlled.  Denying any chest pain or shortness of breath.      Review of Systems - ROS  Constitution: Negative for weakness, weight gain and weight loss.   HENT: Negative for congestion.    Eyes: Negative for blurred vision.   Cardiovascular: As mentioned above  Respiratory: Negative for cough and hemoptysis.    Endocrine: Negative for polydipsia and polyuria.   Hematologic/Lymphatic: Negative for bleeding problem. Does not bruise/bleed easily.   Skin: Negative for flushing.   Musculoskeletal: Negative for neck pain and stiffness.   Gastrointestinal: Negative for abdominal pain, diarrhea, jaundice, melena, nausea and vomiting.   Genitourinary: Negative for dysuria and hematuria.   Neurological: Negative for dizziness, focal weakness and numbness.   Psychiatric/Behavioral: Negative for altered mental status and depression.          All other systems were reviewed and were negative.    family history includes Coronary artery disease in his brother, father, and maternal grandfather; Hearing loss in his mother.     reports that he has never smoked. He has never used smokeless tobacco. He reports that he does not drink alcohol or use drugs.    Allergies   Allergen Reactions   • Cefoxitin Anaphylaxis   • Amlodipine Swelling   • Crestor [Rosuvastatin Calcium] Myalgia   • Hydrocodone Itching   • Lipitor [Atorvastatin] Unknown - Low Severity   • Lortab [Hydrocodone-Acetaminophen] Rash   • Pravastatin Myalgia      "Other allergic reaction   • Zetia [Ezetimibe] Myalgia   • Zocor [Simvastatin] Myalgia     Other allergic reaction         Current Outpatient Medications:   •  Alirocumab 75 MG/ML solution prefilled syringe, Inject 1 pen under the skin into the appropriate area as directed Every 14 (Fourteen) Days., Disp: , Rfl:   •  aspirin 81 MG chewable tablet, Chew 1 tablet Daily., Disp: 30 tablet, Rfl: 0  •  carvedilol (COREG) 25 MG tablet, Take 1 tablet by mouth 2 (Two) Times a Day., Disp: 60 tablet, Rfl: 11  •  cholecalciferol (VITAMIN D3) 25 MCG (1000 UT) tablet, Take 1,000 Units by mouth Daily., Disp: , Rfl:   •  clopidogrel (PLAVIX) 75 MG tablet, TAKE ONE TABLET BY MOUTH EVERY DAY, Disp: 90 tablet, Rfl: 3  •  diphenhydrAMINE HCl, Sleep, (Sleep Aid) 25 MG capsule, Take 1 capsule by mouth Every Night., Disp: , Rfl:   •  folic acid (FOLVITE) 400 MCG tablet, Take 400 mcg by mouth Daily., Disp: , Rfl:   •  furosemide (LASIX) 40 MG tablet, Take 40 mg by mouth Daily As Needed., Disp: , Rfl:   •  hydrALAZINE (APRESOLINE) 50 MG tablet, Take 1.5 tablets by mouth 3 (Three) Times a Day., Disp: 270 tablet, Rfl: 6  •  isosorbide mononitrate (IMDUR) 120 MG 24 hr tablet, Take 2 tablets by mouth Daily., Disp: 30 tablet, Rfl: 0  •  NIFEdipine CC (ADALAT CC) 30 MG 24 hr tablet, Take 1 tablet by mouth Daily., Disp: 30 tablet, Rfl: 3  •  omeprazole (priLOSEC) 20 MG capsule, Take 20 mg by mouth Daily., Disp: , Rfl:   •  sertraline (ZOLOFT) 100 MG tablet, Take 100 mg by mouth Daily., Disp: , Rfl:   •  sodium bicarbonate 650 MG tablet, TAKE ONE TABLET BY MOUTH DAILY, Disp: , Rfl:     Physical Exam:  Vitals:    11/24/20 0849   BP: 130/60   Pulse: 68   SpO2: 98%   Weight: 90.1 kg (198 lb 9.6 oz)   Height: 167.6 cm (66\")   PainSc: 0-No pain     Current Pain Level: none  Pulse Ox: Normal  on room air  General: alert, appears stated age and cooperative     Body Habitus: well-nourished    HEENT: Head: Normocephalic, no lesions, without obvious " abnormality. No arcus senilis, xanthelasma or xanthomas.    Neuro: alert, oriented x3  Pulses: 2+ and symmetric  JVP: Volume/Pulsation: Normal.  Normal waveforms.   Appropriate inspiratory decrease.  No Kussmaul's. No Alba's.   Carotid Exam: no bruit normal pulsation bilaterally   Carotid Volume: normal.     Respirations: no increased work of breathing   Chest:  Normal    Pulmonary:Normal   Precordium: Normal impulses. P2 is not palpable.  RV Heave: absent  LV Heave: absent  Bradley:  normal size and placement  Palpable S4: absent.  Heart rate: normal    Heart Rhythm: regular     Heart Sounds: S1: normal  S2: normal  S3: absent   S4: absent  Opening Snap: absent    Pericardial Rub:  Absent: .    Abdomen:   Appearance: normal .  Palpation: Soft, non-tender to palpation, bowel sounds positive in all four quadrants; no guarding or rebound tenderness  Extremity: no edema.   LE Skin: no rashes  LE Hair:  normal  LE Pulses: well perfused with normal pulses in the distal extremities  Pallor on elevation: Absent. Rubor on dependency: None      DATA REVIEWED:         ECG/EMG Results (all)     None        ---------------------------------------------------  TTE/JOHN:  Results for orders placed during the hospital encounter of 12/20/19   Adult Transthoracic Echo Complete W/ Cont if Necessary Per Protocol    Narrative · Left ventricular wall thickness is consistent with mild-to-moderate   concentric hypertrophy.  · Estimated EF = 58%.  · Left ventricular systolic function is normal.  · Left ventricular diastolic dysfunction (grade I) consistent with   impaired relaxation.  · Left atrial cavity size is moderately dilated.  · Mild mitral valve regurgitation is present  · Mild tricuspid valve regurgitation is present.        CT:   No radiology results for the last 30 days.      --------------------------------------------------------------------------------------------------  LABS:     The CVD Risk score (Jose Elias et al.,  2008) failed to calculate for the following reasons:    The patient has a prior MI, stroke, CHF, or peripheral vascular disease diagnosis         Lab Results   Component Value Date    GLUCOSE 112 (H) 09/14/2020    BUN 21 09/14/2020    CREATININE 1.99 (H) 09/14/2020    EGFRIFNONA 33 (L) 09/14/2020    BCR 10.6 09/14/2020    K 4.1 09/14/2020    CO2 20.4 (L) 09/14/2020    CALCIUM 9.2 09/14/2020    ALBUMIN 4.10 09/14/2020    AST 16 08/25/2020    ALT 12 08/25/2020     Lab Results   Component Value Date    WBC 7.03 08/25/2020    HGB 11.9 (L) 08/25/2020    HCT 36.0 (L) 08/25/2020    MCV 93.0 08/25/2020     08/25/2020     Lab Results   Component Value Date    CHOL 133 08/17/2020    TRIG 176 (H) 08/17/2020    HDL 33 (L) 08/17/2020    LDL 65 08/17/2020     Lab Results   Component Value Date    TSH 1.570 08/25/2020     Lab Results   Component Value Date    CKMB 3.5 12/16/2019    TROPONINI <0.015 07/13/2020    TROPONINT 0.234 (C) 12/21/2019     Lab Results   Component Value Date    HGBA1C 5.4 04/23/2015     No results found for: DDIMER  Lab Results   Component Value Date    ALT 12 08/25/2020     Lab Results   Component Value Date    HGBA1C 5.4 04/23/2015     Lab Results   Component Value Date    CREATININE 1.99 (H) 09/14/2020     No results found for: IRON, TIBC, FERRITIN  Lab Results   Component Value Date    INR 1.07 12/20/2019    INR 1.5 05/05/2015    INR 1.1 04/23/2015    PROTIME 13.7 12/20/2019    PROTIME 17.6 (H) 05/05/2015    PROTIME 13.7 04/23/2015       Interpretation Summary       · Appears to be increased velocities proximal renal artery bilaterally  · Appears to be two left renal arteries, measurements taken from vessel with highest velocity  · Limited exam due to bowel gas     Incidental finding: anechoic structure right kidney, possible cyst   Incidental finding: possible mild hydronephrosis of the left kidney      Right renal artery: Peak systolic velocity of 227cm/sec, EDV of 48cm/sec and RAR <3 consistent  with 0-60% stenosis     Left renal artery: Left proximal renal artery PSV 317cm/sec, EDV of 58cm/sec and RAR of 3.05cm/sec suggestive of >60% stenosis.         Recommend dedicated renal US to assess for cyst and hydronephrosis      IMPRESSION:  Mild left hydronephrosis, unchanged from 2019, favoring chronic.     Bilateral renal cortical thinning can be seen with chronic  medical renal disease.     Simple cyst in the right inferior renal pole.     Prostate hypertrophy.    Assessment/Plan     1.    Coronary artery disease:  Known history of coronary artery disease status post PCI to OM1 in 2009, proximal RCA in 2011, status post CABG with a LIMA to LAD and a sequential graft to RPL and RPDA in 2015 has presented to the hospital with new onset chest pain.  EKG showed ST depressions in lateral leads.  Trending troponin to 0.2.  Patient was taken to the Cath Lab where he was found to have patent SVG and LIMA.  He had severe in-stent restenosis of stent in the OM1.  For which he underwent successful PCI with a 3.0 x 28 mm Xience Cele stent with excellent angiographic results.  Patient tolerated the procedure well..  Echocardiogram with normal LV function.  Continue aspirin Plavix.     2.  Hypertension: Better controlled  On carvedilol 25 mg, Imdur 240mg, Hydralazine 75mg TID. Couldn't tolerate amlodipine.   I added nifedipine last visit his blood pressure is improved  He does have CKD.  Repeat BMP was actually improvement in his creatinine down from 2-1.6    Reviewed his renal arterial ultrasound, he does have a potential moderate disease of 60 to 80% on the left side and 60% the right side.  He also had a dedicated renal ultrasound which showed benign cyst and mild hydronephrosis.  Since his creatinine stable blood pressures well controlled with current medical management at this time.  Will discuss with nephrology, if patient were to have progression of chronic kidney disease can consider potential angioplasty however  that would expose him to contrast again.    Reiterated importance of compliance with medications  Previously could not tolerate ACE inhibitor.     3.  Hyperlipidemia:   Patient has been allergic to statins.    He has been started on PCSK9 better severe.  His last lipid panel in February showed an LDL of 61 and triglycerides of 85 with a total cholesterol of 112 and HDL of 34.       4.  CKD:  [facility]    5.  Shortness of breath: Improved  Factorial from obesity, hypertension, diastolic dysfunction.  I encouraged exercise, weight loss and will be enrolled in cardiac rehab.    PFTs are normal.        Patient's Body mass index is 31.7 kg/m². BMI is above normal parameters. Recommendations include: exercise counseling and nutrition counseling.        Kenya Torres is not a smoker     AAA Screening:   Not needed          This document has been electronically signed by Karime Moore MD on November 24, 2020 09:03 CST

## 2021-03-17 ENCOUNTER — IMMUNIZATION (OUTPATIENT)
Dept: VACCINE CLINIC | Facility: HOSPITAL | Age: 71
End: 2021-03-17

## 2021-03-17 PROCEDURE — 91300 HC SARSCOV02 VAC 30MCG/0.3ML IM: CPT | Performed by: THORACIC SURGERY (CARDIOTHORACIC VASCULAR SURGERY)

## 2021-03-17 PROCEDURE — 0001A: CPT | Performed by: THORACIC SURGERY (CARDIOTHORACIC VASCULAR SURGERY)

## 2021-03-24 ENCOUNTER — OFFICE VISIT (OUTPATIENT)
Dept: CARDIOLOGY | Facility: CLINIC | Age: 71
End: 2021-03-24

## 2021-03-24 VITALS
DIASTOLIC BLOOD PRESSURE: 72 MMHG | OXYGEN SATURATION: 96 % | SYSTOLIC BLOOD PRESSURE: 112 MMHG | WEIGHT: 203 LBS | HEART RATE: 72 BPM | BODY MASS INDEX: 32.62 KG/M2 | HEIGHT: 66 IN

## 2021-03-24 DIAGNOSIS — I25.10 CORONARY ARTERY DISEASE DUE TO LIPID RICH PLAQUE: ICD-10-CM

## 2021-03-24 DIAGNOSIS — E78.2 HYPERLIPEMIA, MIXED: ICD-10-CM

## 2021-03-24 DIAGNOSIS — I25.83 CORONARY ARTERY DISEASE DUE TO LIPID RICH PLAQUE: ICD-10-CM

## 2021-03-24 DIAGNOSIS — I10 HYPERTENSION, ESSENTIAL: Primary | ICD-10-CM

## 2021-03-24 PROCEDURE — 99214 OFFICE O/P EST MOD 30 MIN: CPT | Performed by: INTERNAL MEDICINE

## 2021-03-24 NOTE — PROGRESS NOTES
[facility] Cardiology  OFFICE NOTE    Cardiovascular Medicine  Karime Moore M.D., RPVI         No referring provider defined for this encounter.    Thank you for asking me to see Kenya Ray Brian for CAD.    History of Present Illness  This is a 71 y.o. male with:    1. Atherosclerosis of native coronary artery of native heart without angina pectoris    2. Presence of aortocoronary bypass graft    3. Essential hypertension    4. Pure hypercholesterolemia    5. CKD      Chief complaint - Follow-up cad        History of present Illness-71 y.o.-year-old gentleman with history of coronary disease S/P two vessel bypass with LIMA to LAD and vein graft to the right coronary artery,  he cannot be on any statins including Zetia due to intolerance and myalgia.  He was on repatha and his cholesterol is very good, however he he had to stop it because of insurance..  But he could not continue it due to increased cost.    his EKG showed sinus rhythm with LVH.   Was admitted to hospital December 2019 with the NSTEMI, cardiac cath showed patent SVGs however he had severe in-stent restenosis in the circumflex which underwent successful PCI.  Here for routine follow-up.  Denying any chest pain however he shortness of breath on exertion.  NYHA class II-III.    03/10/2020:  No acute issues since last visit.  He took Lasix with improvement in his swelling.  Shortness of breath is improved since he has been around cardiac rehab as well.  He wants labetalol to be switched to something that the [facility] insurance will cover.  Tolerating aspirin Plavix  without bleeding problems.  He has been started on PCSK9 inhibitor     08/14/2020:  Issues with a tick bites while he was in [place], he is complained of generalized aches and pains he also jumped some chest pain at that time.  He has been treated for his tick bites and subsequently his symptoms are resolved.  He is back to his baseline is perform activities of daily without  any limitations from chest pain.  Blood pressures is running on the higher side.    08/25/2020:  Patient was seen 2 weeks ago.  However he continued to have issues with hypotension being uncontrolled so here for close follow-up.    09/02/2020:  Here for close follow-up for hypertension.  Blood pressure is improved in office today.  However at home he still reported spikes at 160s.  However he is not very compliant and misses his dosages.    03/24/2021:  No acute issues since last visit blood pressure has been well controlled.  Denying any chest pain or shortness of breath.      Review of Systems - ROS  Constitution: Negative for weakness, weight gain and weight loss.   HENT: Negative for congestion.    Eyes: Negative for blurred vision.   Cardiovascular: As mentioned above  Respiratory: Negative for cough and hemoptysis.    Endocrine: Negative for polydipsia and polyuria.   Hematologic/Lymphatic: Negative for bleeding problem. Does not bruise/bleed easily.   Skin: Negative for flushing.   Musculoskeletal: Negative for neck pain and stiffness.   Gastrointestinal: Negative for abdominal pain, diarrhea, jaundice, melena, nausea and vomiting.   Genitourinary: Negative for dysuria and hematuria.   Neurological: Negative for dizziness, focal weakness and numbness.   Psychiatric/Behavioral: Negative for altered mental status and depression.          All other systems were reviewed and were negative.    family history includes Coronary artery disease in his brother, father, and maternal grandfather; Hearing loss in his mother.     reports that he has never smoked. He has never used smokeless tobacco. He reports that he does not drink alcohol and does not use drugs.    Allergies   Allergen Reactions   • Cefoxitin Anaphylaxis   • Amlodipine Swelling   • Crestor [Rosuvastatin Calcium] Myalgia   • Hydrocodone Itching   • Lipitor [Atorvastatin] Unknown - Low Severity   • Lortab [Hydrocodone-Acetaminophen] Rash   • Pravastatin  "Myalgia     Other allergic reaction   • Zetia [Ezetimibe] Myalgia   • Zocor [Simvastatin] Myalgia     Other allergic reaction         Current Outpatient Medications:   •  Alirocumab 75 MG/ML solution prefilled syringe, Inject 1 pen under the skin into the appropriate area as directed Every 14 (Fourteen) Days., Disp: , Rfl:   •  aspirin 81 MG chewable tablet, Chew 1 tablet Daily., Disp: 30 tablet, Rfl: 0  •  carvedilol (COREG) 25 MG tablet, Take 1 tablet by mouth 2 (Two) Times a Day., Disp: 60 tablet, Rfl: 11  •  cholecalciferol (VITAMIN D3) 25 MCG (1000 UT) tablet, Take 1,000 Units by mouth Daily., Disp: , Rfl:   •  clopidogrel (PLAVIX) 75 MG tablet, TAKE ONE TABLET BY MOUTH EVERY DAY, Disp: 90 tablet, Rfl: 3  •  diphenhydrAMINE HCl, Sleep, (Sleep Aid) 25 MG capsule, Take 1 capsule by mouth Every Night., Disp: , Rfl:   •  folic acid (FOLVITE) 400 MCG tablet, Take 400 mcg by mouth Daily., Disp: , Rfl:   •  hydrALAZINE (APRESOLINE) 50 MG tablet, Take 1.5 tablets by mouth 3 (Three) Times a Day., Disp: 270 tablet, Rfl: 6  •  isosorbide mononitrate (IMDUR) 120 MG 24 hr tablet, Take 2 tablets by mouth Daily., Disp: 30 tablet, Rfl: 0  •  NIFEdipine CC (ADALAT CC) 30 MG 24 hr tablet, Take 1 tablet by mouth Daily., Disp: 30 tablet, Rfl: 3  •  omeprazole (priLOSEC) 20 MG capsule, Take 20 mg by mouth Daily., Disp: , Rfl:   •  sertraline (ZOLOFT) 100 MG tablet, Take 100 mg by mouth Daily., Disp: , Rfl:   •  sodium bicarbonate 650 MG tablet, TAKE ONE TABLET BY MOUTH DAILY, Disp: , Rfl:     Physical Exam:  Vitals:    03/24/21 0831   BP: 112/72   BP Location: Right arm   Patient Position: Sitting   Cuff Size: Adult   Pulse: 72   SpO2: 96%   Weight: 92.1 kg (203 lb)   Height: 167.6 cm (65.98\")   PainSc: 0-No pain     Current Pain Level: none  Pulse Ox: Normal  on room air  General: alert, appears stated age and cooperative     Body Habitus: well-nourished    HEENT: Head: Normocephalic, no lesions, without obvious abnormality. No " arcus senilis, xanthelasma or xanthomas.    Neuro: alert, oriented x3  Pulses: 2+ and symmetric  JVP: Volume/Pulsation: Normal.  Normal waveforms.   Appropriate inspiratory decrease.  No Kussmaul's. No Alba's.   Carotid Exam: no bruit normal pulsation bilaterally   Carotid Volume: normal.     Respirations: no increased work of breathing   Chest:  Normal    Pulmonary:Normal   Precordium: Normal impulses. P2 is not palpable.  RV Heave: absent  LV Heave: absent  [place]:  normal size and placement  Palpable S4: absent.  Heart rate: normal    Heart Rhythm: regular     Heart Sounds: S1: normal  S2: normal  S3: absent   S4: absent  Opening Snap: absent    Pericardial Rub:  Absent: .    Abdomen:   Appearance: normal .  Palpation: Soft, non-tender to palpation, bowel sounds positive in all four quadrants; no guarding or rebound tenderness  Extremity: no edema.   LE Skin: no rashes  LE Hair:  normal  LE Pulses: well perfused with normal pulses in the distal extremities  Pallor on elevation: Absent. Rubor on dependency: None      DATA REVIEWED:         ECG/EMG Results (all)     None        ---------------------------------------------------  TTE/JOHN:  Results for orders placed during the hospital encounter of 12/20/19    Adult Transthoracic Echo Complete W/ Cont if Necessary Per Protocol    Interpretation Summary  · Left ventricular wall thickness is consistent with mild-to-moderate concentric hypertrophy.  · Estimated EF = 58%.  · Left ventricular systolic function is normal.  · Left ventricular diastolic dysfunction (grade I) consistent with impaired relaxation.  · Left atrial cavity size is moderately dilated.  · Mild mitral valve regurgitation is present  · Mild tricuspid valve regurgitation is present.    CT:   No radiology results for the last 30 days.      --------------------------------------------------------------------------------------------------  LABS:     The CVD Risk score (Jose Elias et al., 2008) failed  to calculate for the following reasons:    The patient has a prior MI, stroke, CHF, or peripheral vascular disease diagnosis         Lab Results   Component Value Date    GLUCOSE 112 (H) 09/14/2020    BUN 21 09/14/2020    CREATININE 1.99 (H) 09/14/2020    EGFRIFNONA 33 (L) 09/14/2020    BCR 10.6 09/14/2020    K 4.1 09/14/2020    CO2 20.4 (L) 09/14/2020    CALCIUM 9.2 09/14/2020    ALBUMIN 4.10 09/14/2020    AST 16 08/25/2020    ALT 12 08/25/2020     Lab Results   Component Value Date    WBC 7.03 08/25/2020    HGB 11.9 (L) 08/25/2020    HCT 36.0 (L) 08/25/2020    MCV 93.0 08/25/2020     08/25/2020     Lab Results   Component Value Date    CHOL 133 08/17/2020    TRIG 176 (H) 08/17/2020    HDL 33 (L) 08/17/2020    LDL 65 08/17/2020     Lab Results   Component Value Date    TSH 1.570 08/25/2020     Lab Results   Component Value Date    CKMB 3.5 12/16/2019    TROPONINI <0.015 07/13/2020    TROPONINT 0.234 (C) 12/21/2019     Lab Results   Component Value Date    HGBA1C 5.4 04/23/2015     No results found for: DDIMER  Lab Results   Component Value Date    ALT 12 08/25/2020     Lab Results   Component Value Date    HGBA1C 5.4 04/23/2015     Lab Results   Component Value Date    CREATININE 1.99 (H) 09/14/2020     No results found for: IRON, TIBC, FERRITIN  Lab Results   Component Value Date    INR 1.07 12/20/2019    INR 1.5 05/05/2015    INR 1.1 04/23/2015    PROTIME 13.7 12/20/2019    PROTIME 17.6 (H) 05/05/2015    PROTIME 13.7 04/23/2015       Interpretation Summary       · Appears to be increased velocities proximal renal artery bilaterally  · Appears to be two left renal arteries, measurements taken from vessel with highest velocity  · Limited exam due to bowel gas     Incidental finding: anechoic structure right kidney, possible cyst   Incidental finding: possible mild hydronephrosis of the left kidney      Right renal artery: Peak systolic velocity of 227cm/sec, EDV of 48cm/sec and RAR <3 consistent with 0-60%  stenosis     Left renal artery: Left proximal renal artery PSV 317cm/sec, EDV of 58cm/sec and RAR of 3.05cm/sec suggestive of >60% stenosis.         Recommend dedicated renal US to assess for cyst and hydronephrosis      IMPRESSION:  Mild left hydronephrosis, unchanged from 2019, favoring chronic.     Bilateral renal cortical thinning can be seen with chronic  medical renal disease.     Simple cyst in the right inferior renal pole.     Prostate hypertrophy.    Assessment/Plan     1.    Coronary artery disease:  Known history of coronary artery disease status post PCI to OM1 in 2009, proximal RCA in 2011, status post CABG with a LIMA to LAD and a sequential graft to RPL and RPDA in 2015 has presented to the hospital with new onset chest pain.  EKG showed ST depressions in lateral leads.  Trending troponin to 0.2.  Patient was taken to the Cath Lab where he was found to have patent SVG and LIMA.  He had severe in-stent restenosis of stent in the OM1.  For which he underwent successful PCI with a 3.0 x 28 mm Xience Cele stent with excellent angiographic results.  Patient tolerated the procedure well..  Echocardiogram with normal LV function.  Continue aspirin Plavix.     2.  Hypertension: Better controlled now  On carvedilol 25 mg, Imdur 240mg, Hydralazine 75mg TID. Couldn't tolerate amlodipine.   I added nifedipine last visit his blood pressure is improved  He does have CKD with cr in rage of 2-1.6    Reviewed his renal arterial ultrasound, he does have a potential moderate disease of 60 to 80% on the left side and 60% the right side.  He also had a dedicated renal ultrasound which showed benign cyst and mild hydronephrosis.  Since his creatinine stable blood pressures well controlled with current medical management at this time.  Will discuss with nephrology, if patient were to have progression of chronic kidney disease can consider potential angioplasty however that would expose him to contrast again.  Patient  wants to continue medical management this time.    Reiterated importance of compliance with medications  Previously could not tolerate ACE inhibitor.     3.  Hyperlipidemia:   Patient has been allergic to statins.    He has been started on PCSK9 better severe.  His last lipid panel in February showed an LDL of 61 and triglycerides of 85 with a total cholesterol of 112 and HDL of 34.       4.  CKD:  Follows with nephrology    5.  Shortness of breath: Improved  Factorial from obesity, hypertension, diastolic dysfunction.  I encouraged exercise, weight loss and will be enrolled in cardiac rehab.    PFTs are normal.        Patient's Body mass index is 31.7 kg/m². BMI is above normal parameters. Recommendations include: exercise counseling and nutrition counseling.        Kenya Torres is not a smoker     AAA Screening:   Not needed          This document has been electronically signed by Karime Moore MD on March 24, 2021 08:39 CDT

## 2021-04-07 ENCOUNTER — IMMUNIZATION (OUTPATIENT)
Dept: VACCINE CLINIC | Facility: HOSPITAL | Age: 71
End: 2021-04-07

## 2021-04-07 PROCEDURE — 0002A: CPT | Performed by: NURSE PRACTITIONER

## 2021-04-07 PROCEDURE — 91300 HC SARSCOV02 VAC 30MCG/0.3ML IM: CPT | Performed by: NURSE PRACTITIONER

## 2021-09-24 ENCOUNTER — OFFICE VISIT (OUTPATIENT)
Dept: CARDIOLOGY | Facility: CLINIC | Age: 71
End: 2021-09-24

## 2021-09-24 VITALS
OXYGEN SATURATION: 98 % | BODY MASS INDEX: 33.59 KG/M2 | HEIGHT: 66 IN | DIASTOLIC BLOOD PRESSURE: 64 MMHG | SYSTOLIC BLOOD PRESSURE: 128 MMHG | HEART RATE: 92 BPM | WEIGHT: 209 LBS | TEMPERATURE: 97.3 F

## 2021-09-24 DIAGNOSIS — I25.10 CORONARY ARTERY DISEASE INVOLVING NATIVE CORONARY ARTERY OF NATIVE HEART WITHOUT ANGINA PECTORIS: Primary | ICD-10-CM

## 2021-09-24 PROCEDURE — 99214 OFFICE O/P EST MOD 30 MIN: CPT | Performed by: INTERNAL MEDICINE

## 2021-09-24 PROCEDURE — 93000 ELECTROCARDIOGRAM COMPLETE: CPT | Performed by: INTERNAL MEDICINE

## 2021-09-24 NOTE — PROGRESS NOTES
[facility]  OFFICE NOTE    Cardiovascular Medicine  Karime Moore M.D., RPVI         No referring provider defined for this encounter.    Thank you for asking me to see Kenya Ray Brian for CAD.    History of Present Illness  This is a 71 y.o. male with:    1. Atherosclerosis of native coronary artery of native heart without angina pectoris    2. Presence of aortocoronary bypass graft    3. Essential hypertension    4. Pure hypercholesterolemia    5. CKD      Chief complaint - Follow-up cad        History of present Illness-71 y.o.-year-old gentleman with history of coronary disease S/P two vessel bypass with LIMA to LAD and vein graft to the right coronary artery,  he cannot be on any statins including Zetia due to intolerance and myalgia.  He was on repatha and his cholesterol is very good, however he he had to stop it because of insurance..  But he could not continue it due to increased cost.    his EKG showed sinus rhythm with LVH.   Was admitted to hospital December 2019 with the NSTEMI, cardiac cath showed patent SVGs however he had severe in-stent restenosis in the circumflex which underwent successful PCI.  Here for routine follow-up.  Denying any chest pain however he shortness of breath on exertion.  NYHA class II-III.    03/10/2020:  No acute issues since last visit.  He took Lasix with improvement in his swelling.  Shortness of breath is improved since he has been around cardiac rehab as well.  He wants labetalol to be switched to something that the [facility] insurance will cover.  Tolerating aspirin Plavix  without bleeding problems.  He has been started on PCSK9 inhibitor     08/14/2020:  Issues with a tick bites while he was in [place], he is complained of generalized aches and pains he also jumped some chest pain at that time.  He has been treated for his tick bites and subsequently his symptoms are resolved.  He is back to his baseline is perform activities of daily without  any limitations from chest pain.  Blood pressures is running on the higher side.    08/25/2020:  Patient was seen 2 weeks ago.  However he continued to have issues with hypotension being uncontrolled so here for close follow-up.    09/02/2020:  Here for close follow-up for hypertension.  Blood pressure is improved in office today.  However at home he still reported spikes at 160s.  However he is not very compliant and misses his dosages.    03/24/2021:  No acute issues since last visit blood pressure has been well controlled.  Denying any chest pain or shortness of breath.    09/24/2021:  No acute issues since last visit.  Report occasional sharp pains in the chest bilaterally, no chest pain on exertion.  Pain lasts only for few seconds.  Report occasional low blood pressure in the low 100s with dizziness.      Review of Systems - ROS  Constitution: Negative for weakness, weight gain and weight loss.   HENT: Negative for congestion.    Eyes: Negative for blurred vision.   Cardiovascular: As mentioned above  Respiratory: Negative for cough and hemoptysis.    Endocrine: Negative for polydipsia and polyuria.   Hematologic/Lymphatic: Negative for bleeding problem. Does not bruise/bleed easily.   Skin: Negative for flushing.   Musculoskeletal: Negative for neck pain and stiffness.   Gastrointestinal: Negative for abdominal pain, diarrhea, jaundice, melena, nausea and vomiting.   Genitourinary: Negative for dysuria and hematuria.   Neurological: Negative for dizziness, focal weakness and numbness.   Psychiatric/Behavioral: Negative for altered mental status and depression.          All other systems were reviewed and were negative.    family history includes Coronary artery disease in his brother, father, and maternal grandfather; Hearing loss in his mother.     reports that he has never smoked. He has never used smokeless tobacco. He reports that he does not drink alcohol and does not use drugs.    Allergies   Allergen  Reactions   • Cefoxitin Anaphylaxis   • Amlodipine Swelling   • Crestor [Rosuvastatin Calcium] Myalgia   • Hydrocodone Itching   • Lipitor [Atorvastatin] Unknown - Low Severity   • Lortab [Hydrocodone-Acetaminophen] Rash   • Pravastatin Myalgia     Other allergic reaction   • Zetia [Ezetimibe] Myalgia   • Zocor [Simvastatin] Myalgia     Other allergic reaction         Current Outpatient Medications:   •  Alirocumab 75 MG/ML solution prefilled syringe, Inject 1 pen under the skin into the appropriate area as directed Every 14 (Fourteen) Days., Disp: , Rfl:   •  aspirin 81 MG chewable tablet, Chew 1 tablet Daily., Disp: 30 tablet, Rfl: 0  •  carvedilol (COREG) 25 MG tablet, Take 1 tablet by mouth 2 (Two) Times a Day., Disp: 60 tablet, Rfl: 11  •  cholecalciferol (VITAMIN D3) 25 MCG (1000 UT) tablet, Take 1,000 Units by mouth Daily., Disp: , Rfl:   •  clopidogrel (PLAVIX) 75 MG tablet, TAKE ONE TABLET BY MOUTH EVERY DAY, Disp: 90 tablet, Rfl: 3  •  diphenhydrAMINE HCl, Sleep, (Sleep Aid) 25 MG capsule, Take 1 capsule by mouth Every Night., Disp: , Rfl:   •  folic acid (FOLVITE) 400 MCG tablet, Take 400 mcg by mouth Daily., Disp: , Rfl:   •  hydrALAZINE (APRESOLINE) 50 MG tablet, Take 1.5 tablets by mouth 3 (Three) Times a Day., Disp: 270 tablet, Rfl: 6  •  isosorbide mononitrate (IMDUR) 120 MG 24 hr tablet, Take 2 tablets by mouth Daily., Disp: 30 tablet, Rfl: 0  •  NIFEdipine CC (ADALAT CC) 30 MG 24 hr tablet, Take 1 tablet by mouth Daily., Disp: 30 tablet, Rfl: 3  •  omeprazole (priLOSEC) 20 MG capsule, Take 20 mg by mouth Daily., Disp: , Rfl:   •  sertraline (ZOLOFT) 100 MG tablet, Take 100 mg by mouth Daily., Disp: , Rfl:   •  sodium bicarbonate 650 MG tablet, TAKE ONE TABLET BY MOUTH DAILY, Disp: , Rfl:     Physical Exam:  Vitals:    09/24/21 0904   BP: 128/64   BP Location: Left arm   Patient Position: Sitting   Cuff Size: Adult   Pulse: 92   Temp: 97.3 °F (36.3 °C)   SpO2: 98%   Weight: 94.8 kg (209 lb)  "  Height: 167.6 cm (66\")   PainSc: 0-No pain     Current Pain Level: none  Pulse Ox: Normal  on room air  General: alert, appears stated age and cooperative     Body Habitus: well-nourished    HEENT: Head: Normocephalic, no lesions, without obvious abnormality. No arcus senilis, xanthelasma or xanthomas.    Neuro: alert, oriented x3  Pulses: 2+ and symmetric  JVP: Volume/Pulsation: Normal.  Normal waveforms.   Appropriate inspiratory decrease.  No Kussmaul's. No Alba's.   Carotid Exam: no bruit normal pulsation bilaterally   Carotid Volume: normal.     Respirations: no increased work of breathing   Chest:  Normal    Pulmonary:Normal   Precordium: Normal impulses. P2 is not palpable.  RV Heave: absent  LV Heave: absent  [place]:  normal size and placement  Palpable S4: absent.  Heart rate: normal    Heart Rhythm: regular     Heart Sounds: S1: normal  S2: normal  S3: absent   S4: absent  Opening Snap: absent    Pericardial Rub:  Absent: .    Abdomen:   Appearance: normal .  Palpation: Soft, non-tender to palpation, bowel sounds positive in all four quadrants; no guarding or rebound tenderness  Extremity: no edema.   LE Skin: no rashes  LE Hair:  normal  LE Pulses: well perfused with normal pulses in the distal extremities  Pallor on elevation: Absent. Rubor on dependency: None      DATA REVIEWED:         ECG/EMG Results (all)     None        ---------------------------------------------------  TTE/JOHN:  Results for orders placed during the hospital encounter of 12/20/19    Adult Transthoracic Echo Complete W/ Cont if Necessary Per Protocol    Interpretation Summary  · Left ventricular wall thickness is consistent with mild-to-moderate concentric hypertrophy.  · Estimated EF = 58%.  · Left ventricular systolic function is normal.  · Left ventricular diastolic dysfunction (grade I) consistent with impaired relaxation.  · Left atrial cavity size is moderately dilated.  · Mild mitral valve regurgitation is present  · " Mild tricuspid valve regurgitation is present.    CT:   No radiology results for the last 30 days.      --------------------------------------------------------------------------------------------------  LABS:     The CVD Risk score (Jose Elias et al., 2008) failed to calculate for the following reasons:    The patient has a prior MI, stroke, CHF, or peripheral vascular disease diagnosis         Lab Results   Component Value Date    GLUCOSE 112 (H) 09/14/2020    BUN 21 09/14/2020    CREATININE 1.99 (H) 09/14/2020    EGFRIFNONA 33 (L) 09/14/2020    EGFRIFAFRI 45 01/07/2021    BCR 10.6 09/14/2020    K 4.1 09/14/2020    CO2 20.4 (L) 09/14/2020    CALCIUM 9.2 09/14/2020    ALBUMIN 4.10 09/14/2020    AST 16 08/25/2020    ALT 12 08/25/2020     Lab Results   Component Value Date    WBC 7.03 08/25/2020    HGB 11.9 (L) 08/25/2020    HCT 36.0 (L) 08/25/2020    MCV 93.0 08/25/2020     08/25/2020     Lab Results   Component Value Date    CHOL 133 08/17/2020    TRIG 176 (H) 08/17/2020    HDL 33 (L) 08/17/2020    LDL 65 08/17/2020     Lab Results   Component Value Date    TSH 1.570 08/25/2020     Lab Results   Component Value Date    CKMB 3.5 12/16/2019    TROPONINI <0.015 07/13/2020    TROPONINT 0.234 (C) 12/21/2019     Lab Results   Component Value Date    HGBA1C 5.4 04/23/2015     No results found for: DDIMER  Lab Results   Component Value Date    ALT 12 08/25/2020     Lab Results   Component Value Date    HGBA1C 5.4 04/23/2015     Lab Results   Component Value Date    CREATININE 1.99 (H) 09/14/2020     No results found for: IRON, TIBC, FERRITIN  Lab Results   Component Value Date    INR 1.07 12/20/2019    INR 1.5 05/05/2015    INR 1.1 04/23/2015    PROTIME 13.7 12/20/2019    PROTIME 17.6 (H) 05/05/2015    PROTIME 13.7 04/23/2015       Interpretation Summary       · Appears to be increased velocities proximal renal artery bilaterally  · Appears to be two left renal arteries, measurements taken from vessel with highest  velocity  · Limited exam due to bowel gas     Incidental finding: anechoic structure right kidney, possible cyst   Incidental finding: possible mild hydronephrosis of the left kidney      Right renal artery: Peak systolic velocity of 227cm/sec, EDV of 48cm/sec and RAR <3 consistent with 0-60% stenosis     Left renal artery: Left proximal renal artery PSV 317cm/sec, EDV of 58cm/sec and RAR of 3.05cm/sec suggestive of >60% stenosis.         Recommend dedicated renal US to assess for cyst and hydronephrosis      IMPRESSION:  Mild left hydronephrosis, unchanged from 2019, favoring chronic.     Bilateral renal cortical thinning can be seen with chronic  medical renal disease.     Simple cyst in the right inferior renal pole.     Prostate hypertrophy.    Assessment/Plan     1.    Coronary artery disease:  Known history of coronary artery disease status post PCI to OM1 in 2009, proximal RCA in 2011, status post CABG with a LIMA to LAD and a sequential graft to RPL and RPDA in 2015 has presented to the hospital with new onset chest pain.  EKG showed ST depressions in lateral leads.  Trending troponin to 0.2.  Patient was taken to the Cath Lab where he was found to have patent SVG and LIMA.  He had severe in-stent restenosis of stent in the OM1.  For which he underwent successful PCI with a 3.0 x 28 mm Xience Cele stent with excellent angiographic results.  Patient tolerated the procedure well..  Echocardiogram with normal LV function.  Continue aspirin Plavix.     2.  Hypertension: Better controlled now  On carvedilol 25 mg, Imdur 240mg, Hydralazine 75mg TID. Couldn't tolerate amlodipine.   I added nifedipine last visit his blood pressure is improved  He does have CKD with cr in rage of 2-1.6    Reviewed his renal arterial ultrasound, he does have a potential moderate disease of 60 to 80% on the left side and 60% the right side.  He also had a dedicated renal ultrasound which showed benign cyst and mild hydronephrosis.   Since his creatinine stable blood pressures well controlled with current medical management at this time.  Will discuss with nephrology, if patient were to have progression of chronic kidney disease can consider potential angioplasty however that would expose him to contrast again.  Patient wants to continue medical management this time.    Reiterated importance of compliance with medications  Previously could not tolerate ACE inhibitor.     3.  Hyperlipidemia:   Patient has been allergic to statins.    He has been started on PCSK9 better severe.  His last lipid panel in February showed an LDL of 61 and triglycerides of 85 with a total cholesterol of 112 and HDL of 34.       4.  CKD:  Follows with nephrology    5.  Shortness of breath: Improved  Factorial from obesity, hypertension, diastolic dysfunction.  I encouraged exercise, weight loss and will be enrolled in cardiac rehab.    PFTs are normal.        Patient's Body mass index is 31.7 kg/m². BMI is above normal parameters. Recommendations include: exercise counseling and nutrition counseling.        Kenya Torres is not a smoker     AAA Screening:   Not needed          This document has been electronically signed by Karime Moore MD on September 24, 2021 09:10 CDT

## 2021-10-04 LAB
QT INTERVAL: 390 MS
QTC INTERVAL: 482 MS

## 2023-03-02 RX ORDER — NIFEDIPINE 30 MG/1
30 TABLET, FILM COATED, EXTENDED RELEASE ORAL DAILY
Qty: 30 TABLET | Refills: 3 | Status: SHIPPED | OUTPATIENT
Start: 2023-03-02 | End: 2023-03-09

## 2023-03-02 NOTE — TELEPHONE ENCOUNTER
From: Karime Moore MD   Sent: 3/2/2023  10:50 AM CST   To: Trinity Rojas MA   Subject: FW: callback                                     Can uptitrate Nifedipine to 60mg and Hydralazine to 75mg. If continue to stay elevated and symptomatic should come in to ER/urgent care     ----- Message -----   From: Trinity Rojas MA   Sent: 3/2/2023  10:20 AM CST   To: Karime Moore MD   Subject: FW: callback                                       ----- Message -----   From: Ni Álvarez RegSched Rep   Sent: 3/2/2023   8:15 AM CST   To: Trinity Rojas MA   Subject: callback                                         His wife called and said his BP last night was 200/100 and 170/109 this morning. She said they've tried everything to bring it back down and nothing's working. She would like a callback at [phone] to talk about what they need to do. Thanks    The patient stated he had stopped nifedipine not long after it had started and has only been taking hydralazine 50 mg tid. Per dr. Moore the patient should restart nifedipine. The patient did state he took two of the hydralazine 50 mg this morning and his bp is 135/72 right now. He was instructed not to take nifedipine today if he bp is that low. Per dr. Moore hydralazine should remain at 50 mg tid.

## 2023-03-08 ENCOUNTER — TELEPHONE (OUTPATIENT)
Dept: CARDIOLOGY | Facility: CLINIC | Age: 73
End: 2023-03-08
Payer: OTHER GOVERNMENT

## 2023-03-08 NOTE — TELEPHONE ENCOUNTER
Sadia Starks Donna L, MA  Phone Number:  [phone] (Call me)     Kenya Torres  [birth year] stated he has been waiting since last Thursday for a call back/appt, he stated his BP is running 209/100 and his shoulders/back is hurting very badly. He wanted a call back @ [phone] to talk about this.     Patient states his bp is still running high after restarting nifedipine. He did see his pcp and bp was not abnormally elevated at that time. He will see dr. Moore tomorrow at 11 am.

## 2023-03-08 NOTE — TELEPHONE ENCOUNTER
They were advised to report to er if bp stays 200/100 for further evaluation. They verbalized understanding.

## 2023-03-09 ENCOUNTER — LAB (OUTPATIENT)
Dept: LAB | Facility: HOSPITAL | Age: 73
End: 2023-03-09
Payer: MEDICARE

## 2023-03-09 ENCOUNTER — OFFICE VISIT (OUTPATIENT)
Dept: CARDIOLOGY | Facility: CLINIC | Age: 73
End: 2023-03-09
Payer: MEDICARE

## 2023-03-09 VITALS
WEIGHT: 199.4 LBS | HEART RATE: 85 BPM | BODY MASS INDEX: 32.05 KG/M2 | DIASTOLIC BLOOD PRESSURE: 80 MMHG | SYSTOLIC BLOOD PRESSURE: 152 MMHG | OXYGEN SATURATION: 99 % | HEIGHT: 66 IN

## 2023-03-09 DIAGNOSIS — N18.9 CHRONIC KIDNEY DISEASE, UNSPECIFIED CKD STAGE: Primary | ICD-10-CM

## 2023-03-09 DIAGNOSIS — I25.10 CORONARY ARTERY DISEASE DUE TO LIPID RICH PLAQUE: ICD-10-CM

## 2023-03-09 DIAGNOSIS — I25.83 CORONARY ARTERY DISEASE DUE TO LIPID RICH PLAQUE: ICD-10-CM

## 2023-03-09 DIAGNOSIS — I10 HYPERTENSION, UNSPECIFIED TYPE: Chronic | ICD-10-CM

## 2023-03-09 DIAGNOSIS — E78.2 HYPERLIPEMIA, MIXED: ICD-10-CM

## 2023-03-09 DIAGNOSIS — I10 HYPERTENSION, UNSPECIFIED TYPE: Primary | Chronic | ICD-10-CM

## 2023-03-09 LAB
ALBUMIN SERPL-MCNC: 4.1 G/DL (ref 3.5–5.2)
ALBUMIN/GLOB SERPL: 1.4 G/DL
ALP SERPL-CCNC: 78 U/L (ref 39–117)
ALT SERPL W P-5'-P-CCNC: 17 U/L (ref 1–41)
ANION GAP SERPL CALCULATED.3IONS-SCNC: 9 MMOL/L (ref 5–15)
AST SERPL-CCNC: 14 U/L (ref 1–40)
BILIRUB SERPL-MCNC: 0.2 MG/DL (ref 0–1.2)
BUN SERPL-MCNC: 25 MG/DL (ref 8–23)
BUN/CREAT SERPL: 13.2 (ref 7–25)
CALCIUM SPEC-SCNC: 9.4 MG/DL (ref 8.6–10.5)
CHLORIDE SERPL-SCNC: 110 MMOL/L (ref 98–107)
CO2 SERPL-SCNC: 22 MMOL/L (ref 22–29)
CREAT SERPL-MCNC: 1.89 MG/DL (ref 0.76–1.27)
DEPRECATED RDW RBC AUTO: 46.5 FL (ref 37–54)
EGFRCR SERPLBLD CKD-EPI 2021: 37 ML/MIN/1.73
ERYTHROCYTE [DISTWIDTH] IN BLOOD BY AUTOMATED COUNT: 14 % (ref 12.3–15.4)
GLOBULIN UR ELPH-MCNC: 2.9 GM/DL
GLUCOSE SERPL-MCNC: 119 MG/DL (ref 65–99)
HCT VFR BLD AUTO: 35.8 % (ref 37.5–51)
HGB BLD-MCNC: 12.3 G/DL (ref 13–17.7)
MCH RBC QN AUTO: 31.4 PG (ref 26.6–33)
MCHC RBC AUTO-ENTMCNC: 34.4 G/DL (ref 31.5–35.7)
MCV RBC AUTO: 91.3 FL (ref 79–97)
PLATELET # BLD AUTO: 257 10*3/MM3 (ref 140–450)
PMV BLD AUTO: 10.3 FL (ref 6–12)
POTASSIUM SERPL-SCNC: 4.3 MMOL/L (ref 3.5–5.2)
PROT SERPL-MCNC: 7 G/DL (ref 6–8.5)
QT INTERVAL: 426 MS
QTC INTERVAL: 506 MS
RBC # BLD AUTO: 3.92 10*6/MM3 (ref 4.14–5.8)
SODIUM SERPL-SCNC: 141 MMOL/L (ref 136–145)
TSH SERPL DL<=0.05 MIU/L-ACNC: 2.47 UIU/ML (ref 0.27–4.2)
WBC NRBC COR # BLD: 8.18 10*3/MM3 (ref 3.4–10.8)

## 2023-03-09 PROCEDURE — 93000 ELECTROCARDIOGRAM COMPLETE: CPT | Performed by: INTERNAL MEDICINE

## 2023-03-09 PROCEDURE — 80053 COMPREHEN METABOLIC PANEL: CPT | Performed by: INTERNAL MEDICINE

## 2023-03-09 PROCEDURE — 85027 COMPLETE CBC AUTOMATED: CPT | Performed by: INTERNAL MEDICINE

## 2023-03-09 PROCEDURE — 84443 ASSAY THYROID STIM HORMONE: CPT

## 2023-03-09 PROCEDURE — 99214 OFFICE O/P EST MOD 30 MIN: CPT | Performed by: INTERNAL MEDICINE

## 2023-03-09 PROCEDURE — 36415 COLL VENOUS BLD VENIPUNCTURE: CPT | Performed by: INTERNAL MEDICINE

## 2023-03-09 RX ORDER — NIFEDIPINE 30 MG/1
60 TABLET, FILM COATED, EXTENDED RELEASE ORAL DAILY
Qty: 60 TABLET | Refills: 3 | Status: SHIPPED | OUTPATIENT
Start: 2023-03-09

## 2023-03-09 RX ORDER — PREDNISOLONE ACETATE 10 MG/ML
1 SUSPENSION/ DROPS OPHTHALMIC
COMMUNITY
Start: 2023-03-06 | End: 2023-03-17

## 2023-03-09 RX ORDER — ERYTHROMYCIN 5 MG/G
OINTMENT OPHTHALMIC
COMMUNITY
Start: 2023-03-06

## 2023-03-09 NOTE — PROGRESS NOTES
[facility]  OFFICE NOTE    Cardiovascular Medicine  Karime Moore M.D., RPVI         No referring provider defined for this encounter.    Thank you for asking me to see Kenya Torres for CAD.    This is a 72 y.o. male with:    1. Atherosclerosis of native coronary artery of native heart without angina pectoris    2. Presence of aortocoronary bypass graft    3. Essential hypertension    4. Pure hypercholesterolemia    5. CKD      Chief complaint - Follow-up cad        History of present Illness-72 y.o.-year-old gentleman with history of coronary disease S/P two vessel bypass with LIMA to LAD and vein graft to the right coronary artery,  he cannot be on any statins including Zetia due to intolerance and myalgia.  He was on repatha and his cholesterol is very good, however he he had to stop it because of insurance..  But he could not continue it due to increased cost.    his EKG showed sinus rhythm with LVH.   Was admitted to hospital December 2019 with the NSTEMI, cardiac cath showed patent SVGs however he had severe in-stent restenosis in the circumflex which underwent successful PCI.  Here for routine follow-up.  Denying any chest pain however he shortness of breath on exertion.  NYHA class II-III.    03/10/2020:  No acute issues since last visit.  He took Lasix with improvement in his swelling.  Shortness of breath is improved since he has been around cardiac rehab as well.  He wants labetalol to be switched to something that the [facility] insurance will cover.  Tolerating aspirin Plavix  without bleeding problems.  He has been started on PCSK9 inhibitor     08/14/2020:  Issues with a tick bites while he was in [place], he is complained of generalized aches and pains he also jumped some chest pain at that time.  He has been treated for his tick bites and subsequently his symptoms are resolved.  He is back to his baseline is perform activities of daily without any limitations from chest  pain.  Blood pressures is running on the higher side.    08/25/2020:  Patient was seen 2 weeks ago.  However he continued to have issues with hypotension being uncontrolled so here for close follow-up.    09/02/2020:  Here for close follow-up for hypertension.  Blood pressure is improved in office today.  However at home he still reported spikes at 160s.  However he is not very compliant and misses his dosages.    03/24/2021:  No acute issues since last visit blood pressure has been well controlled.  Denying any chest pain or shortness of breath.    09/24/2021:  No acute issues since last visit.  Report occasional sharp pains in the chest bilaterally, no chest pain on exertion.  Pain lasts only for few seconds.  Report occasional low blood pressure in the low 100s with dizziness.    03/09/2023:  Lost to follow-up since November 2021.  Had fluctuation in blood pressures again.  He had stopped his nifedipine.  Complaining of lack of energy.  Significant fluctuation of blood pressure noted.  Blood pressure ranges anywhere from 140s to 200s at home.  Has not change his diet, has not gained significant weight.  He does have sleep apnea but has been compliant with his CPAP and medications.    Review of Systems - ROS  Constitution: Negative for weakness, weight gain and weight loss.   HENT: Negative for congestion.    Eyes: Negative for blurred vision.   Cardiovascular: As mentioned above  Respiratory: Negative for cough and hemoptysis.    Endocrine: Negative for polydipsia and polyuria.   Hematologic/Lymphatic: Negative for bleeding problem. Does not bruise/bleed easily.   Skin: Negative for flushing.   Musculoskeletal: Negative for neck pain and stiffness.   Gastrointestinal: Negative for abdominal pain, diarrhea, jaundice, melena, nausea and vomiting.   Genitourinary: Negative for dysuria and hematuria.   Neurological: Negative for dizziness, focal weakness and numbness.   Psychiatric/Behavioral: Negative for altered  mental status and depression.     I reviewed the ROS as documented here and confirmed the accuracy of it with the patient today. 3/9/2023        All other systems were reviewed and were negative.    family history includes Coronary artery disease in his brother, father, and maternal grandfather; Hearing loss in his mother.     reports that he has never smoked. He has never used smokeless tobacco. He reports that he does not drink alcohol and does not use drugs.    Allergies   Allergen Reactions   • Cefoxitin Anaphylaxis   • Amlodipine Swelling   • Crestor [Rosuvastatin Calcium] Myalgia   • Hydrocodone Itching   • Lipitor [Atorvastatin] Unknown - Low Severity   • Lortab [Hydrocodone-Acetaminophen] Rash   • Pravastatin Myalgia     Other allergic reaction   • Zetia [Ezetimibe] Myalgia   • Zocor [Simvastatin] Myalgia     Other allergic reaction         Current Outpatient Medications:   •  Alirocumab 75 MG/ML solution prefilled syringe, Inject 1 pen under the skin into the appropriate area as directed Every 14 (Fourteen) Days., Disp: , Rfl:   •  aspirin 81 MG chewable tablet, Chew 1 tablet Daily., Disp: 30 tablet, Rfl: 0  •  carvedilol (COREG) 25 MG tablet, Take 1 tablet by mouth 2 (Two) Times a Day., Disp: 60 tablet, Rfl: 11  •  Cholecalciferol (vitamin D3) 125 MCG (5000 UT) tablet tablet, Take 1 tablet by mouth Daily., Disp: , Rfl:   •  clopidogrel (PLAVIX) 75 MG tablet, TAKE ONE TABLET BY MOUTH EVERY DAY, Disp: 90 tablet, Rfl: 3  •  diphenhydrAMINE HCl, Sleep, 25 MG capsule, Take 1 capsule by mouth Every Night., Disp: , Rfl:   •  erythromycin (ROMYCIN) 5 MG/GM ophthalmic ointment, Apply one inch ribbon to right eye qhs, Disp: , Rfl:   •  Folic Acid (FOLATE PO), Take 666 mcg by mouth Daily., Disp: , Rfl:   •  hydrALAZINE (APRESOLINE) 50 MG tablet, Take 1.5 tablets by mouth 3 (Three) Times a Day. (Patient taking differently: Take 1 tablet by mouth 3 (Three) Times a Day.), Disp: 270 tablet, Rfl: 6  •  isosorbide  "mononitrate (IMDUR) 120 MG 24 hr tablet, Take 2 tablets by mouth Daily., Disp: 30 tablet, Rfl: 0  •  NIFEdipine CC (ADALAT CC) 30 MG 24 hr tablet, Take 1 tablet by mouth Daily., Disp: 30 tablet, Rfl: 3  •  omeprazole (priLOSEC) 20 MG capsule, Take 1 capsule by mouth Daily., Disp: , Rfl:   •  prednisoLONE acetate (PRED FORTE) 1 % ophthalmic suspension, 1 drop., Disp: , Rfl:   •  sertraline (ZOLOFT) 100 MG tablet, Take 1 tablet by mouth Daily., Disp: , Rfl:   •  sodium bicarbonate 650 MG tablet, TAKE ONE TABLET BY MOUTH DAILY, Disp: , Rfl:     Physical Exam:  Vitals:    03/09/23 1108   BP: 152/80   BP Location: Left arm   Patient Position: Sitting   Cuff Size: Adult   Pulse: 85   SpO2: 99%   Weight: 90.4 kg (199 lb 6.4 oz)   Height: 167.6 cm (66\")   PainSc: 0-No pain   PainLoc: Chest     Current Pain Level: none  Pulse Ox: Normal  on room air  General: alert, appears stated age and cooperative     Body Habitus: well-nourished    HEENT: Head: Normocephalic, no lesions, without obvious abnormality. No arcus senilis, xanthelasma or xanthomas.    Neuro: alert, oriented x3  Pulses: 2+ and symmetric  JVP: Volume/Pulsation: Normal.  Normal waveforms.   Appropriate inspiratory decrease.  No Kussmaul's. No Alba's.   Carotid Exam: no bruit normal pulsation bilaterally   Carotid Volume: normal.     Respirations: no increased work of breathing   Chest:  Normal    Pulmonary:Normal   Precordium: Normal impulses. P2 is not palpable.  RV Heave: absent  LV Heave: absent  McLean:  normal size and placement  Palpable S4: absent.  Heart rate: normal    Heart Rhythm: regular     Heart Sounds: S1: normal  S2: normal  S3: absent   S4: absent  Opening Snap: absent    Pericardial Rub:  Absent: .    Abdomen:   Appearance: normal .  Palpation: Soft, non-tender to palpation, bowel sounds positive in all four quadrants; no guarding or rebound tenderness  Extremity: no edema.   LE Skin: no rashes  LE Hair:  normal  LE Pulses: well perfused with " normal pulses in the distal extremities  Pallor on elevation: Absent. Rubor on dependency: None      DATA REVIEWED:         ECG/EMG Results (all)     None        ---------------------------------------------------  TTE/JOHN:  Results for orders placed during the hospital encounter of 12/20/19    Adult Transthoracic Echo Complete W/ Cont if Necessary Per Protocol    Interpretation Summary  · Left ventricular wall thickness is consistent with mild-to-moderate concentric hypertrophy.  · Estimated EF = 58%.  · Left ventricular systolic function is normal.  · Left ventricular diastolic dysfunction (grade I) consistent with impaired relaxation.  · Left atrial cavity size is moderately dilated.  · Mild mitral valve regurgitation is present  · Mild tricuspid valve regurgitation is present.    CT:   No radiology results for the last 30 days.      --------------------------------------------------------------------------------------------------  LABS:     The CVD Risk score (Jose Elias et al., 2008) failed to calculate for the following reasons:    The patient has a prior MI, stroke, CHF, or peripheral vascular disease diagnosis         Lab Results   Component Value Date    GLUCOSE 112 (H) 09/14/2020    BUN 21 09/14/2020    CREATININE 1.99 (H) 09/14/2020    EGFRIFNONA 33 (L) 09/14/2020    EGFRIFAFRI 45 01/07/2021    BCR 10.6 09/14/2020    K 4.1 09/14/2020    CO2 20.4 (L) 09/14/2020    CALCIUM 9.2 09/14/2020    ALBUMIN 4.10 09/14/2020    AST 16 08/25/2020    ALT 12 08/25/2020     Lab Results   Component Value Date    WBC 7.03 08/25/2020    HGB 11.9 (L) 08/25/2020    HCT 36.0 (L) 08/25/2020    MCV 93.0 08/25/2020     08/25/2020     Lab Results   Component Value Date    CHOL 133 08/17/2020    TRIG 176 (H) 08/17/2020    HDL 33 (L) 08/17/2020    LDL 65 08/17/2020     Lab Results   Component Value Date    TSH 1.570 08/25/2020     Lab Results   Component Value Date    CKMB 3.5 12/16/2019    TROPONINI <0.015 07/13/2020     TROPONINT 0.234 (C) 12/21/2019     Lab Results   Component Value Date    HGBA1C 5.4 04/23/2015     No results found for: DDIMER  Lab Results   Component Value Date    ALT 12 08/25/2020     Lab Results   Component Value Date    HGBA1C 5.4 04/23/2015     Lab Results   Component Value Date    CREATININE 1.99 (H) 09/14/2020     No results found for: IRON, TIBC, FERRITIN  Lab Results   Component Value Date    INR 1.07 12/20/2019    INR 1.5 05/05/2015    INR 1.1 04/23/2015    PROTIME 13.7 12/20/2019    PROTIME 17.6 (H) 05/05/2015    PROTIME 13.7 04/23/2015       Interpretation Summary       · Appears to be increased velocities proximal renal artery bilaterally  · Appears to be two left renal arteries, measurements taken from vessel with highest velocity  · Limited exam due to bowel gas     Incidental finding: anechoic structure right kidney, possible cyst   Incidental finding: possible mild hydronephrosis of the left kidney      Right renal artery: Peak systolic velocity of 227cm/sec, EDV of 48cm/sec and RAR <3 consistent with 0-60% stenosis     Left renal artery: Left proximal renal artery PSV 317cm/sec, EDV of 58cm/sec and RAR of 3.05cm/sec suggestive of >60% stenosis.         Recommend dedicated renal US to assess for cyst and hydronephrosis      IMPRESSION:  Mild left hydronephrosis, unchanged from 2019, favoring chronic.     Bilateral renal cortical thinning can be seen with chronic  medical renal disease.     Simple cyst in the right inferior renal pole.     Prostate hypertrophy.    Assessment/Plan     1.    Coronary artery disease:  Known history of coronary artery disease status post PCI to OM1 in 2009, proximal RCA in 2011, status post CABG with a LIMA to LAD and a sequential graft to RPL and RPDA in 2015 has presented to the hospital with new onset chest pain.  EKG showed ST depressions in lateral leads.  Trending troponin to 0.2.  Patient was taken to the Cath Lab where he was found to have patent SVG and LIMA.   He had severe in-stent restenosis of stent in the OM1.  For which he underwent successful PCI with a 3.0 x 28 mm Xience Cele stent with excellent angiographic results.  Patient tolerated the procedure well..  Echocardiogram with normal LV function.  Continue aspirin Plavix.     2.  Hypertension:  On carvedilol 25 mg, Imdur 240mg, Hydralazine 50mg TID. Couldn't tolerate amlodipine.   I added nifedipine, with improvement in blood pressure but he stopped taking it.  He does have CKD with cr in rage of 2-1.6  Previously could not tolerate ACE inhibitor.  Needs repeat blood work for progression of CKD.  Will uptitrate dose of nifedipine to 60 mg.  Can increase carvedilol and hydralazine if continues to stay elevated.  We will refer him back to nephrology and see if he can start him on ARB.  Close follow-up with PCP for further up titration of medications.  Repeat echocardiogram.    Reviewed his renal arterial ultrasound, he does have a potential moderate disease of 60 to 80% on the left side and 60% the right side.  He also had a dedicated renal ultrasound which showed benign cyst and mild hydronephrosis..  Will discuss with nephrology, if patient were to have progression of chronic kidney disease can consider potential angioplasty however that would expose him to contrast again.  Can refer him for a CO2 angiogram if continues to have issues with uncontrolled hypertension.    Reiterated importance of compliance with medications       3.  Hyperlipidemia:   Patient has been allergic to statins.    He has been started on PCSK9 better severe.  His last lipid panel in February showed an LDL of 61 and triglycerides of 85 with a total cholesterol of 112 and HDL of 34.       4.  CKD:  Follows with nephrology    5.  Shortness of breath: Improved  Factorial from obesity, hypertension, diastolic dysfunction.  I encouraged exercise, weight loss and will be enrolled in cardiac rehab.    PFTs are normal.        Patient's Body mass  index is 31.7 kg/m². BMI is above normal parameters. Recommendations include: exercise counseling and nutrition counseling.        Kenya Torres is not a smoker     AAA Screening:   Not needed          This document has been electronically signed by Karime Moore MD on March 9, 2023 11:16 CST

## 2023-03-10 ENCOUNTER — TELEPHONE (OUTPATIENT)
Dept: CARDIOLOGY | Facility: CLINIC | Age: 73
End: 2023-03-10
Payer: MEDICARE

## 2023-03-10 NOTE — TELEPHONE ENCOUNTER
----- Message from Karime Moore MD sent at 3/9/2023  8:35 PM CST -----  Abs are stable. Cr is stable    ----- Message -----  From: Lab, Background User  Sent: 3/9/2023   8:00 PM CST  To: Karime Moore MD      Patient contacted with lab results per dr. Moore.

## 2023-03-15 ENCOUNTER — TELEPHONE (OUTPATIENT)
Dept: CARDIOLOGY | Facility: CLINIC | Age: 73
End: 2023-03-15
Payer: MEDICARE

## 2023-03-15 NOTE — TELEPHONE ENCOUNTER
Karime Moore MD Oldham, Donna L, MA  Phone Number: [phone]     Did he see Nephrology? Can uptitrate the dose of Nifedipine to 90mg.           Previous Messages       ----- Message -----   From: Trinity Rojas MA   Sent: 3/15/2023   1:20 PM CDT   To: Karime Moore MD       ----- Message -----   From: Debi Ramirez   Sent: 3/15/2023   8:38 AM CDT   To: Trinity Rojas MA     Bp still running too high  172/90.  Very tired. Please advise   Darlene, wife is calling    Patient instructed to take 90 mg of nifedipine one time daily. He has a nephrology appt early April. The patient states he will take the 30 mg tabs to equal 90 mg.

## 2023-03-16 ENCOUNTER — TELEPHONE (OUTPATIENT)
Dept: CARDIOLOGY | Facility: CLINIC | Age: 73
End: 2023-03-16
Payer: MEDICARE

## 2023-03-16 NOTE — TELEPHONE ENCOUNTER
Phone Number: [phone]     Ok. He is probably dehydrated then. Should go to ER/Urgent care           Previous Messages       ----- Message -----   From: Trinity Rojas MA   Sent: 3/16/2023  11:51 AM CDT   To: Karime Moore MD     His wife said he cannot get up without being very dizzy, he is freezing, and now has diarrhea.   ----- Message -----   From: Karime Moore MD   Sent: 3/16/2023  10:55 AM CDT   To: Trinity Rojas MA     That's better. I am ok with continuing same medications. He will need some time getting adjusted to normal blood pressure.   Keep himself well hydrated.     ----- Message -----   From: Trinity Rojas MA   Sent: 3/16/2023  10:46 AM CDT   To: Karime Moore MD       ----- Message -----   From: Debi Ramirez   Sent: 3/16/2023   9:18 AM CDT   To: Trinity Rojas MA     Changed bp meds yesterday. Today his BP is 108/54  chills and dizzy.  Wife Darlene  calling.       Patient went to urgent care at [facility]. They did say he was dehydrated. The patients wife wanted to know if he could take 60 mg of nifedipine in the morning and 30 mg at night. Per dr. Moore it is ok to take that way. They were also instructed if his bp was too low they could hold a dose of hydralazine per dr. Moore.

## 2023-04-19 ENCOUNTER — HOSPITAL ENCOUNTER (OUTPATIENT)
Dept: ULTRASOUND IMAGING | Facility: HOSPITAL | Age: 73
Discharge: HOME | End: 2023-04-19
Admitting: INTERNAL MEDICINE
Payer: MEDICARE

## 2023-04-19 DIAGNOSIS — N18.32 CHRONIC KIDNEY DISEASE (CKD) STAGE G3B/A1, MODERATELY DECREASED GLOMERULAR FILTRATION RATE (GFR) BETWEEN 30-44 ML/MIN/1.73 SQUARE METER AND ALBUMINURIA CREATININE RATIO LESS THAN 30 MG/G (CMS/H*: ICD-10-CM

## 2023-04-19 PROCEDURE — 76775 US EXAM ABDO BACK WALL LIM: CPT

## 2023-04-20 ENCOUNTER — TELEPHONE (OUTPATIENT)
Dept: CARDIOLOGY | Facility: CLINIC | Age: 73
End: 2023-04-20

## 2023-04-20 LAB
BH CV ECHO MEAS - AI P1/2T: 620.5 MSEC
BH CV ECHO MEAS - AO MAX PG: 12.7 MMHG
BH CV ECHO MEAS - AO MEAN PG: 7.3 MMHG
BH CV ECHO MEAS - AO ROOT DIAM: 3.2 CM
BH CV ECHO MEAS - AO V2 MAX: 178.4 CM/SEC
BH CV ECHO MEAS - AO V2 VTI: 38.2 CM
BH CV ECHO MEAS - AVA(I,D): 2.09 CM2
BH CV ECHO MEAS - EDV(CUBED): 7.1 ML
BH CV ECHO MEAS - EDV(MOD-SP2): 111 ML
BH CV ECHO MEAS - EDV(MOD-SP4): 148 ML
BH CV ECHO MEAS - EF(MOD-BP): 58.1 %
BH CV ECHO MEAS - EF(MOD-SP2): 57.9 %
BH CV ECHO MEAS - EF(MOD-SP4): 57 %
BH CV ECHO MEAS - ESV(CUBED): 42 ML
BH CV ECHO MEAS - ESV(MOD-SP2): 46.7 ML
BH CV ECHO MEAS - ESV(MOD-SP4): 63.7 ML
BH CV ECHO MEAS - FS: -80.9 %
BH CV ECHO MEAS - IVS/LVPW: 1.48 CM
BH CV ECHO MEAS - IVSD: 1.35 CM
BH CV ECHO MEAS - LA DIMENSION: 5.4 CM
BH CV ECHO MEAS - LAT PEAK E' VEL: 11.7 CM/SEC
BH CV ECHO MEAS - LV MASS(C)D: 55.8 GRAMS
BH CV ECHO MEAS - LV MAX PG: 4.7 MMHG
BH CV ECHO MEAS - LV MEAN PG: 2.5 MMHG
BH CV ECHO MEAS - LV V1 MAX: 108.3 CM/SEC
BH CV ECHO MEAS - LV V1 VTI: 24.6 CM
BH CV ECHO MEAS - LVIDD: 1.92 CM
BH CV ECHO MEAS - LVIDS: 3.5 CM
BH CV ECHO MEAS - LVOT AREA: 3.2 CM2
BH CV ECHO MEAS - LVOT DIAM: 2.03 CM
BH CV ECHO MEAS - LVPWD: 0.92 CM
BH CV ECHO MEAS - MED PEAK E' VEL: 8.7 CM/SEC
BH CV ECHO MEAS - MR MAX PG: 76.8 MMHG
BH CV ECHO MEAS - MR MAX VEL: 438.3 CM/SEC
BH CV ECHO MEAS - MV A MAX VEL: 63.5 CM/SEC
BH CV ECHO MEAS - MV DEC SLOPE: 675.8 CM/SEC2
BH CV ECHO MEAS - MV DEC TIME: 0.19 MSEC
BH CV ECHO MEAS - MV E MAX VEL: 130 CM/SEC
BH CV ECHO MEAS - MV E/A: 2.05
BH CV ECHO MEAS - MV MAX PG: 9.2 MMHG
BH CV ECHO MEAS - MV MEAN PG: 2.9 MMHG
BH CV ECHO MEAS - MV V2 VTI: 43.5 CM
BH CV ECHO MEAS - MVA(VTI): 1.83 CM2
BH CV ECHO MEAS - PA V2 MAX: 126.7 CM/SEC
BH CV ECHO MEAS - RAP SYSTOLE: 3 MMHG
BH CV ECHO MEAS - RV MAX PG: 4.5 MMHG
BH CV ECHO MEAS - RV V1 MAX: 106.4 CM/SEC
BH CV ECHO MEAS - RV V1 VTI: 26.1 CM
BH CV ECHO MEAS - RVDD: 4.4 CM
BH CV ECHO MEAS - RVSP: 27.1 MMHG
BH CV ECHO MEAS - SV(LVOT): 79.6 ML
BH CV ECHO MEAS - SV(MOD-SP2): 64.3 ML
BH CV ECHO MEAS - SV(MOD-SP4): 84.3 ML
BH CV ECHO MEAS - TAPSE (>1.6): 1.95 CM
BH CV ECHO MEAS - TR MAX PG: 24.1 MMHG
BH CV ECHO MEAS - TR MAX VEL: 245.4 CM/SEC
BH CV ECHO MEASUREMENTS AVERAGE E/E' RATIO: 12.75
BH CV XLRA - RV BASE: 4.8 CM
MAXIMAL PREDICTED HEART RATE: 146 BPM
SINUS: 2.9 CM
STRESS TARGET HR: 124 BPM

## 2023-04-20 NOTE — TELEPHONE ENCOUNTER
----- Message from Karime Moore MD sent at 4/20/2023  1:53 PM CDT -----  EF is preserved. Diastolic dysfunction noted secondary to uncontrolled HTN  RV is mildly dilated, unchanged from before.   Marcin recommend sleep study and PFTs if not done before.       ----- Message -----  From: Karime Moore MD  Sent: 4/20/2023   1:52 PM CDT  To: Karime Moore MD    Attempted to contact patient. Voicemail left asking him to call the office. Patient had a pft in 2020.

## 2023-04-25 ENCOUNTER — TELEPHONE (OUTPATIENT)
Dept: CARDIOLOGY | Facility: CLINIC | Age: 73
End: 2023-04-25
Payer: OTHER GOVERNMENT

## 2023-04-25 NOTE — TELEPHONE ENCOUNTER
Karime Moore MD Oldham, Donna L, MA  EF is preserved. Diastolic dysfunction noted secondary to uncontrolled HTN   RV is mildly dilated, unchanged from before.   Marcin recommend sleep study and PFTs if not done before.           Previous Messages    Patient contacted with echo results per dr. Moore.

## 2023-04-27 ENCOUNTER — OFFICE VISIT (OUTPATIENT)
Dept: CARDIOLOGY | Facility: CLINIC | Age: 73
End: 2023-04-27
Payer: MEDICARE

## 2023-04-27 VITALS
SYSTOLIC BLOOD PRESSURE: 138 MMHG | WEIGHT: 191.5 LBS | DIASTOLIC BLOOD PRESSURE: 68 MMHG | HEART RATE: 89 BPM | HEIGHT: 66 IN | BODY MASS INDEX: 30.78 KG/M2

## 2023-04-27 DIAGNOSIS — I10 HYPERTENSION, UNSPECIFIED TYPE: ICD-10-CM

## 2023-04-27 DIAGNOSIS — I25.10 CORONARY ARTERY DISEASE INVOLVING NATIVE CORONARY ARTERY OF NATIVE HEART, UNSPECIFIED WHETHER ANGINA PRESENT: Primary | ICD-10-CM

## 2023-04-27 DIAGNOSIS — E78.2 HYPERLIPEMIA, MIXED: ICD-10-CM

## 2023-04-27 DIAGNOSIS — R00.2 PALPITATIONS: ICD-10-CM

## 2023-04-27 RX ORDER — LOSARTAN POTASSIUM AND HYDROCHLOROTHIAZIDE 12.5; 5 MG/1; MG/1
1 TABLET ORAL EVERY MORNING
COMMUNITY
Start: 2023-04-04

## 2023-04-27 NOTE — PROGRESS NOTES
[facility] Cardiology  OFFICE NOTE    Cardiovascular Medicine  Karime Moore M.D., RPVI         No referring provider defined for this encounter.    Thank you for asking me to see Kenya Torres for CAD.    This is a 73 y.o. male with:    1. Atherosclerosis of native coronary artery of native heart without angina pectoris    2. Presence of aortocoronary bypass graft    3. Essential hypertension    4. Pure hypercholesterolemia    5. CKD      Chief complaint - Follow-up cad        History of present Illness-73 y.o.-year-old gentleman with history of coronary disease S/P two vessel bypass with LIMA to LAD and vein graft to the right coronary artery,  he cannot be on any statins including Zetia due to intolerance and myalgia.  He was on repatha and his cholesterol is very good, however he he had to stop it because of insurance..  But he could not continue it due to increased cost.    his EKG showed sinus rhythm with LVH.   Was admitted to hospital December 2019 with the NSTEMI, cardiac cath showed patent SVGs however he had severe in-stent restenosis in the circumflex which underwent successful PCI.  Here for routine follow-up.  Denying any chest pain however he shortness of breath on exertion.  NYHA class II-III.    03/10/2020:  No acute issues since last visit.  He took Lasix with improvement in his swelling.  Shortness of breath is improved since he has been around cardiac rehab as well.  He wants labetalol to be switched to something that the [facility] will cover.  Tolerating aspirin Plavix  without bleeding problems.  He has been started on PCSK9 inhibitor     08/14/2020:  Issues with a tick bites while he was in [place], he is complained of generalized aches and pains he also jumped some chest pain at that time.  He has been treated for his tick bites and subsequently his symptoms are resolved.  He is back to his baseline is perform activities of daily without any limitations from chest  pain.  Blood pressures is running on the higher side.    08/25/2020:  Patient was seen 2 weeks ago.  However he continued to have issues with hypotension being uncontrolled so here for close follow-up.    09/02/2020:  Here for close follow-up for hypertension.  Blood pressure is improved in office today.  However at home he still reported spikes at 160s.  However he is not very compliant and misses his dosages.    03/24/2021:  No acute issues since last visit blood pressure has been well controlled.  Denying any chest pain or shortness of breath.    09/24/2021:  No acute issues since last visit.  Report occasional sharp pains in the chest bilaterally, no chest pain on exertion.  Pain lasts only for few seconds.  Report occasional low blood pressure in the low 100s with dizziness.    03/09/2023:  Lost to follow-up since November 2021.  Had fluctuation in blood pressures again.  He had stopped his nifedipine.  Complaining of lack of energy.  Significant fluctuation of blood pressure noted.  Blood pressure ranges anywhere from 140s to 200s at home.  Has not change his diet, has not gained significant weight.  He does have sleep apnea but has been compliant with his CPAP and medications.    04/27/2023:  Continues to have lack of energy and intermittent dizziness.  Occasional palpitations.  Blood pressure has improved.  Denying any chest pain.    Review of Systems - ROS  Constitution: Negative for weakness, weight gain and weight loss.   HENT: Negative for congestion.    Eyes: Negative for blurred vision.   Cardiovascular: As mentioned above  Respiratory: Negative for cough and hemoptysis.    Endocrine: Negative for polydipsia and polyuria.   Hematologic/Lymphatic: Negative for bleeding problem. Does not bruise/bleed easily.   Skin: Negative for flushing.   Musculoskeletal: Negative for neck pain and stiffness.   Gastrointestinal: Negative for abdominal pain, diarrhea, jaundice, melena, nausea and vomiting.    Genitourinary: Negative for dysuria and hematuria.   Neurological: Negative for dizziness, focal weakness and numbness.   Psychiatric/Behavioral: Negative for altered mental status and depression.     I reviewed the ROS as documented here and confirmed the accuracy of it with the patient today. 4/27/2023        All other systems were reviewed and were negative.    family history includes Coronary artery disease in his brother, father, and maternal grandfather; Hearing loss in his mother.     reports that he has never smoked. He has never used smokeless tobacco. He reports that he does not drink alcohol and does not use drugs.    Allergies   Allergen Reactions   • Cefoxitin Anaphylaxis   • Amlodipine Swelling   • Crestor [Rosuvastatin Calcium] Myalgia   • Hydrocodone Itching   • Lipitor [Atorvastatin] Unknown - Low Severity   • Lortab [Hydrocodone-Acetaminophen] Rash   • Pravastatin Myalgia     Other allergic reaction   • Zetia [Ezetimibe] Myalgia   • Zocor [Simvastatin] Myalgia     Other allergic reaction         Current Outpatient Medications:   •  Alirocumab 75 MG/ML solution prefilled syringe, Inject 1 pen under the skin into the appropriate area as directed Every 14 (Fourteen) Days., Disp: , Rfl:   •  aspirin 81 MG chewable tablet, Chew 1 tablet Daily., Disp: 30 tablet, Rfl: 0  •  carvedilol (COREG) 25 MG tablet, Take 1 tablet by mouth 2 (Two) Times a Day., Disp: 60 tablet, Rfl: 11  •  Cholecalciferol (vitamin D3) 125 MCG (5000 UT) tablet tablet, Take 1 tablet by mouth Daily., Disp: , Rfl:   •  clopidogrel (PLAVIX) 75 MG tablet, TAKE ONE TABLET BY MOUTH EVERY DAY, Disp: 90 tablet, Rfl: 3  •  diphenhydrAMINE HCl, Sleep, 25 MG capsule, Take 1 capsule by mouth Every Night., Disp: , Rfl:   •  Folic Acid (FOLATE PO), Take 666 mcg by mouth Daily., Disp: , Rfl:   •  hydrALAZINE (APRESOLINE) 50 MG tablet, Take 1.5 tablets by mouth 3 (Three) Times a Day. (Patient taking differently: Take 1 tablet by mouth. Bid unless  "bp is high then will take a noon dose), Disp: 270 tablet, Rfl: 6  •  isosorbide mononitrate (IMDUR) 120 MG 24 hr tablet, Take 2 tablets by mouth Daily., Disp: 30 tablet, Rfl: 0  •  losartan-hydrochlorothiazide (HYZAAR) 50-12.5 MG per tablet, Take 1 tablet by mouth Every Morning., Disp: , Rfl:   •  omeprazole (priLOSEC) 20 MG capsule, Take 1 capsule by mouth Daily., Disp: , Rfl:   •  sertraline (ZOLOFT) 100 MG tablet, Take 1 tablet by mouth Daily., Disp: , Rfl:   •  sodium bicarbonate 650 MG tablet, TAKE ONE TABLET BY MOUTH DAILY, Disp: , Rfl:   •  NIFEdipine CC (ADALAT CC) 30 MG 24 hr tablet, Take 2 tablets by mouth Daily. (Patient not taking: Reported on 4/27/2023), Disp: 60 tablet, Rfl: 3    Physical Exam:  Vitals:    04/27/23 1044   BP: 138/68   BP Location: Left arm   Patient Position: Sitting   Cuff Size: Adult   Pulse: 89   Weight: 86.9 kg (191 lb 8 oz)   Height: 167.6 cm (66\")   PainSc: 0-No pain   PainLoc: Chest     Current Pain Level: none  Pulse Ox: Normal  on room air  General: alert, appears stated age and cooperative     Body Habitus: well-nourished    HEENT: Head: Normocephalic, no lesions, without obvious abnormality. No arcus senilis, xanthelasma or xanthomas.    Neuro: alert, oriented x3  Pulses: 2+ and symmetric  JVP: Volume/Pulsation: Normal.  Normal waveforms.   Appropriate inspiratory decrease.  No Kussmaul's. No Alba's.   Carotid Exam: no bruit normal pulsation bilaterally   Carotid Volume: normal.     Respirations: no increased work of breathing   Chest:  Normal    Pulmonary:Normal   Precordium: Normal impulses. P2 is not palpable.  RV Heave: absent  LV Heave: absent  Friendship:  normal size and placement  Palpable S4: absent.  Heart rate: normal    Heart Rhythm: regular     Heart Sounds: S1: normal  S2: normal  S3: absent   S4: absent  Opening Snap: absent    Pericardial Rub:  Absent: .    Abdomen:   Appearance: normal .  Palpation: Soft, non-tender to palpation, bowel sounds positive in all " four quadrants; no guarding or rebound tenderness  Extremity: no edema.   LE Skin: no rashes  LE Hair:  normal  LE Pulses: well perfused with normal pulses in the distal extremities  Pallor on elevation: Absent. Rubor on dependency: None    I reviewed the ROS as documented here and confirmed the accuracy of it with the patient today. 4/27/2023       DATA REVIEWED:         ECG/EMG Results (all)     None        ---------------------------------------------------  TTE/JOHN:  Results for orders placed in visit on 03/09/23    Adult Transthoracic Echo Complete W/ Cont if Necessary Per Protocol    Interpretation Summary  •  Left ventricular systolic function is normal. Left ventricular ejection fraction appears to be 56 - 60%.  •  Left ventricular wall thickness is consistent with mild concentric hypertrophy.  •  Left ventricular diastolic function is consistent with (grade II w/high LAP) pseudonormalization.  •  The right ventricular cavity is mildly dilated.  •  Left atrial volume is moderately increased.    CT:   US Renal Bilateral    Result Date: 4/19/2023  1.  Mild left hydronephrosis.  No hydronephrosis seen on the right.  No calculus is seen in either kidney. 2.  Simple appearing left renal cysts. 3.  Enlarged prostate.        --------------------------------------------------------------------------------------------------  LABS:     The CVD Risk score (Olegarioino, et al., 2008) failed to calculate for the following reasons:    The patient has a prior MI, stroke, CHF, or peripheral vascular disease diagnosis         Lab Results   Component Value Date    GLUCOSE 119 (H) 03/09/2023    BUN 25 (H) 03/09/2023    CREATININE 1.89 (H) 03/09/2023    EGFRIFNONA 33 (L) 09/14/2020    EGFRIFAFRI 45 01/07/2021    BCR 13.2 03/09/2023    K 4.3 03/09/2023    CO2 22.0 03/09/2023    CALCIUM 9.4 03/09/2023    ALBUMIN 4.1 03/09/2023    AST 14 03/09/2023    ALT 17 03/09/2023     Lab Results   Component Value Date    WBC 8.18 03/09/2023     HGB 12.3 (L) 03/09/2023    HCT 35.8 (L) 03/09/2023    MCV 91.3 03/09/2023     03/09/2023     Lab Results   Component Value Date    CHOL 133 08/17/2020    TRIG 176 (H) 08/17/2020    HDL 33 (L) 08/17/2020    LDL 65 08/17/2020     Lab Results   Component Value Date    TSH 2.470 03/09/2023     Lab Results   Component Value Date    CKMB 3.5 12/16/2019    TROPONINI <0.015 07/13/2020    TROPONINT 0.234 (C) 12/21/2019     Lab Results   Component Value Date    HGBA1C 5.4 04/23/2015     No results found for: DDIMER  Lab Results   Component Value Date    ALT 17 03/09/2023     Lab Results   Component Value Date    HGBA1C 5.4 04/23/2015     Lab Results   Component Value Date    CREATININE 1.89 (H) 03/09/2023     No results found for: IRON, TIBC, FERRITIN  Lab Results   Component Value Date    INR 1.07 12/20/2019    INR 1.5 05/05/2015    INR 1.1 04/23/2015    PROTIME 13.7 12/20/2019    PROTIME 17.6 (H) 05/05/2015    PROTIME 13.7 04/23/2015       Interpretation Summary       · Appears to be increased velocities proximal renal artery bilaterally  · Appears to be two left renal arteries, measurements taken from vessel with highest velocity  · Limited exam due to bowel gas     Incidental finding: anechoic structure right kidney, possible cyst   Incidental finding: possible mild hydronephrosis of the left kidney      Right renal artery: Peak systolic velocity of 227cm/sec, EDV of 48cm/sec and RAR <3 consistent with 0-60% stenosis     Left renal artery: Left proximal renal artery PSV 317cm/sec, EDV of 58cm/sec and RAR of 3.05cm/sec suggestive of >60% stenosis.         Recommend dedicated renal US to assess for cyst and hydronephrosis      IMPRESSION:  Mild left hydronephrosis, unchanged from 2019, favoring chronic.     Bilateral renal cortical thinning can be seen with chronic  medical renal disease.     Simple cyst in the right inferior renal pole.     Prostate hypertrophy.    Assessment/Plan     1.    Coronary artery  disease:  Known history of coronary artery disease status post PCI to OM1 in 2009, proximal RCA in 2011, status post CABG with a LIMA to LAD and a sequential graft to RPL and RPDA in 2015 has presented to the hospital with new onset chest pain.  EKG showed ST depressions in lateral leads.  Trending troponin to 0.2. 2019  Patient was taken to the Cath Lab where he was found to have patent SVG and LIMA.  He had severe in-stent restenosis of stent in the OM1.  For which he underwent successful PCI with a 3.0 x 28 mm Xience Cele stent with excellent angiographic results.  Patient tolerated the procedure well..  Echocardiogram with normal LV function.  Continue aspirin Plavix.     2.  Hypertension:  On carvedilol 25 mg, Imdur 240mg, Hydralazine 50mg TID. Couldn't tolerate amlodipine.   I added nifedipine, with improvement in blood pressure but he stopped taking it.  He does have CKD with cr in rage of 2-1.6  Previously could not tolerate ACE inhibitor.    Was having fluctuation in blood pressure.  Start him back on nifedipine at some improvement but he was having intermittent hypotension.  He has been seen by nephrology and has been started on losartan/hydrochlorothiazide 50/12.5.  Renal ultrasound showed hydronephrosis on the left side.    Reviewed his renal arterial ultrasound, he does have a potential moderate disease of 60 to 80% on the left side and 60% the right side.  He also had a dedicated renal ultrasound which showed benign cyst and mild hydronephrosis..  Will discuss with nephrology, if patient were to have progression of chronic kidney disease can consider potential angioplasty however that would expose him to contrast again.  Can refer him for a CO2 angiogram if continues to have issues with uncontrolled hypertension.    Reiterated importance of compliance with medications       3.  Hyperlipidemia:   Patient has been allergic to statins.    He has been started on PCSK9 better severe.  His last lipid panel  in February showed an LDL of 61 and triglycerides of 85 with a total cholesterol of 112 and HDL of 34.       4.  CKD:  Follows with nephrology    5.  Shortness of breath: Improved  Factorial from obesity, hypertension, diastolic dysfunction.  I encouraged exercise, weight loss and will be enrolled in cardiac rehab.    PFTs are normal.    6.  Lack of energy:  Patient is complaining of lack of energy.  EKG is atrial and PACs.  Echocardiogram was with preserved LV systolic function.  He does have grade 2 diastolic dysfunction.  We will plan on performing a Holter monitor to assess for burden of PACs and a stress test for progression of disease.  I explained risk, benefits, alternatives in relation nuclear stress test with the patient patient is agreeable.          Patient's Body mass index is 31.7 kg/m². BMI is above normal parameters. Recommendations include: exercise counseling and nutrition counseling.        Kenya Torres is not a smoker     AAA Screening:   Not needed          This document has been electronically signed by Karime Moore MD on April 27, 2023 10:48 CDT

## 2023-04-29 LAB
QT INTERVAL: 370 MS
QTC INTERVAL: 450 MS

## 2023-05-09 ENCOUNTER — HOSPITAL ENCOUNTER (OUTPATIENT)
Dept: NUCLEAR MEDICINE | Facility: HOSPITAL | Age: 73
Discharge: HOME | End: 2023-05-09
Payer: MEDICARE

## 2023-05-09 ENCOUNTER — HOSPITAL ENCOUNTER (OUTPATIENT)
Dept: CARDIOLOGY | Facility: HOSPITAL | Age: 73
Discharge: HOME | End: 2023-05-09
Payer: MEDICARE

## 2023-05-09 LAB
BH CV REST NUCLEAR ISOTOPE DOSE: 10.9 MCI
BH CV STRESS COMMENTS STAGE 1: NORMAL
BH CV STRESS DOSE REGADENOSON STAGE 1: 0.4
BH CV STRESS DURATION MIN STAGE 1: 0
BH CV STRESS DURATION SEC STAGE 1: 10
BH CV STRESS NUCLEAR ISOTOPE DOSE: 33.8 MCI
BH CV STRESS PROTOCOL 1: NORMAL
BH CV STRESS RECOVERY BP: NORMAL MMHG
BH CV STRESS RECOVERY HR: 83 BPM
BH CV STRESS RECOVERY O2: 97 %
BH CV STRESS STAGE 1: 1
LV EF NUC BP: 45 %
MAXIMAL PREDICTED HEART RATE: 146 BPM
PERCENT MAX PREDICTED HR: 67.12 %
STRESS BASELINE BP: NORMAL MMHG
STRESS BASELINE HR: 78 BPM
STRESS O2 SAT REST: 96 %
STRESS PERCENT HR: 79 %
STRESS POST EXERCISE DUR SEC: 21 SEC
STRESS POST O2 SAT PEAK: 97 %
STRESS POST PEAK BP: NORMAL MMHG
STRESS POST PEAK HR: 98 BPM
STRESS TARGET HR: 124 BPM

## 2023-05-09 PROCEDURE — 0 TECHNETIUM SESTAMIBI: Performed by: INTERNAL MEDICINE

## 2023-05-09 PROCEDURE — 93017 CV STRESS TEST TRACING ONLY: CPT

## 2023-05-09 PROCEDURE — A9500 TC99M SESTAMIBI: HCPCS | Performed by: INTERNAL MEDICINE

## 2023-05-09 PROCEDURE — 25010000002 REGADENOSON 0.4 MG/5ML SOLUTION: Performed by: INTERNAL MEDICINE

## 2023-05-09 PROCEDURE — 78451 HT MUSCLE IMAGE SPECT SING: CPT

## 2023-05-09 RX ORDER — SODIUM CHLORIDE 9 MG/ML
10 INJECTION INTRAVENOUS ONCE
Status: COMPLETED | OUTPATIENT
Start: 2023-05-09 | End: 2023-05-09

## 2023-05-09 RX ORDER — REGADENOSON 0.08 MG/ML
0.4 INJECTION, SOLUTION INTRAVENOUS ONCE
Status: COMPLETED | OUTPATIENT
Start: 2023-05-09 | End: 2023-05-09

## 2023-05-09 RX ADMIN — SODIUM CHLORIDE 10 ML: 9 INJECTION INTRAMUSCULAR; INTRAVENOUS; SUBCUTANEOUS at 10:41

## 2023-05-09 RX ADMIN — TECHNETIUM TC 99M SESTAMIBI 1 DOSE: 1 INJECTION INTRAVENOUS at 10:42

## 2023-05-09 RX ADMIN — TECHNETIUM TC 99M SESTAMIBI 1 DOSE: 1 INJECTION INTRAVENOUS at 09:16

## 2023-05-09 RX ADMIN — REGADENOSON 0.4 MG: 0.08 INJECTION, SOLUTION INTRAVENOUS at 10:41

## 2023-05-10 NOTE — PROGRESS NOTES
Discussed results of nuclear stress test.  No significant ischemia noted.  EF was mildly reduced on stress.  Currently patient is denying any chest pain.  His blood pressure is better controlled.  He is complaining of claudications we discussed ABIs but he wants to hold off for now.    We did discuss limitations of nuclear stress test including balanced ischemia and talked about cardiac cath for definitive evaluation.  However he has significant CKD and wants to hold off on invasive evaluation at this time.    Continue medical management.

## 2023-05-25 ENCOUNTER — TELEPHONE (OUTPATIENT)
Dept: CARDIOLOGY | Facility: CLINIC | Age: 73
End: 2023-05-25
Payer: OTHER GOVERNMENT

## 2023-05-25 NOTE — TELEPHONE ENCOUNTER
Contacted patient per Dr. Moore, monitor showed some SVTs and PACs but no atrial fibrillation. He was understanding.     ----- Message from Karime Moore MD sent at 5/24/2023  6:19 PM CDT -----  Heart monitor showed some SVTs and PACs but no atrial fibrillation.  ----- Message -----  From: Karime Moore MD  Sent: 5/24/2023   6:17 PM CDT  To: Karime Moore MD

## 2023-05-30 ENCOUNTER — LAB (OUTPATIENT)
Dept: LAB | Facility: HOSPITAL | Age: 73
End: 2023-05-30

## 2023-05-30 ENCOUNTER — TRANSCRIBE ORDERS (OUTPATIENT)
Dept: LAB | Facility: HOSPITAL | Age: 73
End: 2023-05-30

## 2023-05-30 DIAGNOSIS — I10 HYPERTENSION, ESSENTIAL: Primary | ICD-10-CM

## 2023-05-30 DIAGNOSIS — I25.10 ATHEROSCLEROSIS OF NATIVE CORONARY ARTERY, UNSPECIFIED WHETHER ANGINA PRESENT, UNSPECIFIED WHETHER NATIVE OR TRANSPLANTED HEART: ICD-10-CM

## 2023-05-30 DIAGNOSIS — N25.0 RENAL OSTEODYSTROPHY: ICD-10-CM

## 2023-05-30 DIAGNOSIS — N18.32 CHRONIC KIDNEY DISEASE (CKD) STAGE G3B/A1, MODERATELY DECREASED GLOMERULAR FILTRATION RATE (GFR) BETWEEN 30-44 ML/MIN/1.73 SQUARE METER AND ALBUMINURIA CREATININE RATIO LESS THAN 30 MG/G (CMS/H*: ICD-10-CM

## 2023-05-30 DIAGNOSIS — I12.9 PARENCHYMAL RENAL HYPERTENSION, STAGE 1 THROUGH STAGE 4 OR UNSPECIFIED CHRONIC KIDNEY DISEASE: ICD-10-CM

## 2023-05-30 PROCEDURE — 82306 VITAMIN D 25 HYDROXY: CPT | Performed by: INTERNAL MEDICINE

## 2023-05-30 PROCEDURE — 83970 ASSAY OF PARATHORMONE: CPT | Performed by: INTERNAL MEDICINE

## 2023-05-30 PROCEDURE — 36415 COLL VENOUS BLD VENIPUNCTURE: CPT | Performed by: INTERNAL MEDICINE

## 2023-05-30 PROCEDURE — 80069 RENAL FUNCTION PANEL: CPT | Performed by: INTERNAL MEDICINE

## 2023-05-30 PROCEDURE — 84156 ASSAY OF PROTEIN URINE: CPT | Performed by: INTERNAL MEDICINE

## 2023-05-30 PROCEDURE — 84550 ASSAY OF BLOOD/URIC ACID: CPT | Performed by: INTERNAL MEDICINE

## 2023-05-30 PROCEDURE — 82570 ASSAY OF URINE CREATININE: CPT | Performed by: INTERNAL MEDICINE

## 2023-05-31 LAB
25(OH)D3 SERPL-MCNC: 62 NG/ML (ref 30–100)
ALBUMIN SERPL-MCNC: 4.1 G/DL (ref 3.5–5.2)
ANION GAP SERPL CALCULATED.3IONS-SCNC: 9 MMOL/L (ref 5–15)
BUN SERPL-MCNC: 34 MG/DL (ref 8–23)
BUN/CREAT SERPL: 16.2 (ref 7–25)
CALCIUM SPEC-SCNC: 9.1 MG/DL (ref 8.6–10.5)
CHLORIDE SERPL-SCNC: 110 MMOL/L (ref 98–107)
CO2 SERPL-SCNC: 23 MMOL/L (ref 22–29)
CREAT SERPL-MCNC: 2.1 MG/DL (ref 0.76–1.27)
CREAT UR-MCNC: 99.7 MG/DL
EGFRCR SERPLBLD CKD-EPI 2021: 32.4 ML/MIN/1.73
GLUCOSE SERPL-MCNC: 101 MG/DL (ref 65–99)
PHOSPHATE SERPL-MCNC: 3.5 MG/DL (ref 2.5–4.5)
POTASSIUM SERPL-SCNC: 4.5 MMOL/L (ref 3.5–5.2)
PROT ?TM UR-MCNC: 11.8 MG/DL
PROT/CREAT UR: 118.4 MG/G CREA (ref 0–200)
PTH-INTACT SERPL-MCNC: 69 PG/ML (ref 15–65)
SODIUM SERPL-SCNC: 142 MMOL/L (ref 136–145)
URATE SERPL-MCNC: 9.1 MG/DL (ref 3.4–7)

## 2023-07-27 ENCOUNTER — OFFICE VISIT (OUTPATIENT)
Dept: CARDIOLOGY | Facility: CLINIC | Age: 73
End: 2023-07-27
Payer: MEDICARE

## 2023-07-27 VITALS
WEIGHT: 199.5 LBS | HEART RATE: 88 BPM | BODY MASS INDEX: 32.06 KG/M2 | DIASTOLIC BLOOD PRESSURE: 64 MMHG | HEIGHT: 66 IN | SYSTOLIC BLOOD PRESSURE: 120 MMHG | OXYGEN SATURATION: 98 %

## 2023-07-27 DIAGNOSIS — I25.810 CORONARY ARTERY DISEASE INVOLVING CORONARY BYPASS GRAFT OF NATIVE HEART WITHOUT ANGINA PECTORIS: Primary | ICD-10-CM

## 2023-07-27 LAB
QT INTERVAL: 374 MS
QTC INTERVAL: 452 MS

## 2023-07-27 PROCEDURE — 99214 OFFICE O/P EST MOD 30 MIN: CPT | Performed by: INTERNAL MEDICINE

## 2023-07-27 PROCEDURE — 1160F RVW MEDS BY RX/DR IN RCRD: CPT | Performed by: INTERNAL MEDICINE

## 2023-07-27 PROCEDURE — 3078F DIAST BP <80 MM HG: CPT | Performed by: INTERNAL MEDICINE

## 2023-07-27 PROCEDURE — 3074F SYST BP LT 130 MM HG: CPT | Performed by: INTERNAL MEDICINE

## 2023-07-27 PROCEDURE — 1159F MED LIST DOCD IN RCRD: CPT | Performed by: INTERNAL MEDICINE

## 2023-07-27 PROCEDURE — 93000 ELECTROCARDIOGRAM COMPLETE: CPT | Performed by: INTERNAL MEDICINE

## 2023-07-27 NOTE — PROGRESS NOTES
[facility]  OFFICE NOTE    Cardiovascular Medicine  Karime Moore M.D., RPVI         No referring provider defined for this encounter.    Thank you for asking me to see Kenya Torres for CAD.    This is a 73 y.o. male with:    1. Atherosclerosis of native coronary artery of native heart without angina pectoris    2. Presence of aortocoronary bypass graft    3. Essential hypertension    4. Pure hypercholesterolemia    5. CKD      Chief complaint - Follow-up cad        History of present Illness-73 y.o.-year-old gentleman with history of coronary disease S/P two vessel bypass with LIMA to LAD and vein graft to the right coronary artery,  he cannot be on any statins including Zetia due to intolerance and myalgia.  He was on repatha and his cholesterol is very good, however he he had to stop it because of insurance..  But he could not continue it due to increased cost.    his EKG showed sinus rhythm with LVH.   Was admitted to hospital December 2019 with the NSTEMI, cardiac cath showed patent SVGs however he had severe in-stent restenosis in the circumflex which underwent successful PCI.  Here for routine follow-up.  Denying any chest pain however he shortness of breath on exertion.  NYHA class II-III.    03/10/2020:  No acute issues since last visit.  He took Lasix with improvement in his swelling.  Shortness of breath is improved since he has been around cardiac rehab as well.  He wants labetalol to be switched to something that the [facility] insurance will cover.  Tolerating aspirin Plavix  without bleeding problems.  He has been started on PCSK9 inhibitor     08/14/2020:  Issues with a tick bites while he was in [place], he is complained of generalized aches and pains he also jumped some chest pain at that time.  He has been treated for his tick bites and subsequently his symptoms are resolved.  He is back to his baseline is perform activities of daily without any limitations from chest  pain.  Blood pressures is running on the higher side.    08/25/2020:  Patient was seen 2 weeks ago.  However he continued to have issues with hypotension being uncontrolled so here for close follow-up.    09/02/2020:  Here for close follow-up for hypertension.  Blood pressure is improved in office today.  However at home he still reported spikes at 160s.  However he is not very compliant and misses his dosages.    03/24/2021:  No acute issues since last visit blood pressure has been well controlled.  Denying any chest pain or shortness of breath.    09/24/2021:  No acute issues since last visit.  Report occasional sharp pains in the chest bilaterally, no chest pain on exertion.  Pain lasts only for few seconds.  Report occasional low blood pressure in the low 100s with dizziness.    03/09/2023:  Lost to follow-up since November 2021.  Had fluctuation in blood pressures again.  He had stopped his nifedipine.  Complaining of lack of energy.  Significant fluctuation of blood pressure noted.  Blood pressure ranges anywhere from 140s to 200s at home.  Has not change his diet, has not gained significant weight.  He does have sleep apnea but has been compliant with his CPAP and medications.    04/27/2023:  Continues to have lack of energy and intermittent dizziness.  Occasional palpitations.  Blood pressure has improved.  Denying any chest pain.    07/27/2023:  Patient is feeling better.  Energy level has improved.  Chronic shortness of breath at baseline.  Blood pressure is improved.  Denying any chest pain.    Review of Systems - ROS  Constitution: Negative for weakness, weight gain and weight loss.   HENT: Negative for congestion.    Eyes: Negative for blurred vision.   Cardiovascular: As mentioned above  Respiratory: Negative for cough and hemoptysis.    Endocrine: Negative for polydipsia and polyuria.   Hematologic/Lymphatic: Negative for bleeding problem. Does not bruise/bleed easily.   Skin: Negative for flushing.    Musculoskeletal: Negative for neck pain and stiffness.   Gastrointestinal: Negative for abdominal pain, diarrhea, jaundice, melena, nausea and vomiting.   Genitourinary: Negative for dysuria and hematuria.   Neurological: Negative for dizziness, focal weakness and numbness.   Psychiatric/Behavioral: Negative for altered mental status and depression.     I reviewed the ROS as documented here and confirmed the accuracy of it with the patient today. 7/27/2023        All other systems were reviewed and were negative.    family history includes Coronary artery disease in his brother, father, and maternal grandfather; Hearing loss in his mother.     reports that he has never smoked. He has never used smokeless tobacco. He reports that he does not drink alcohol and does not use drugs.    Allergies   Allergen Reactions    Cefoxitin Anaphylaxis    Amlodipine Swelling    Crestor [Rosuvastatin Calcium] Myalgia    Hydrocodone Itching    Lipitor [Atorvastatin] Unknown - Low Severity    Lortab [Hydrocodone-Acetaminophen] Rash    Pravastatin Myalgia     Other allergic reaction    Zetia [Ezetimibe] Myalgia    Zocor [Simvastatin] Myalgia     Other allergic reaction         Current Outpatient Medications:     Alirocumab 75 MG/ML solution prefilled syringe, Inject 1 pen under the skin into the appropriate area as directed Every 14 (Fourteen) Days., Disp: , Rfl:     aspirin 81 MG chewable tablet, Chew 1 tablet Daily., Disp: 30 tablet, Rfl: 0    carvedilol (COREG) 25 MG tablet, Take 1 tablet by mouth 2 (Two) Times a Day., Disp: 60 tablet, Rfl: 11    Cholecalciferol (vitamin D3) 125 MCG (5000 UT) tablet tablet, Take 1 tablet by mouth Daily., Disp: , Rfl:     clopidogrel (PLAVIX) 75 MG tablet, TAKE ONE TABLET BY MOUTH EVERY DAY, Disp: 90 tablet, Rfl: 3    diphenhydrAMINE HCl, Sleep, 25 MG capsule, Take 1 capsule by mouth Every Night., Disp: , Rfl:     Folic Acid (FOLATE PO), Take 666 mcg by mouth Daily., Disp: , Rfl:     hydrALAZINE  "(APRESOLINE) 50 MG tablet, Take 1.5 tablets by mouth 3 (Three) Times a Day. (Patient taking differently: Take 1 tablet by mouth. Bid unless bp is high then will take a noon dose), Disp: 270 tablet, Rfl: 6    isosorbide mononitrate (IMDUR) 120 MG 24 hr tablet, Take 2 tablets by mouth Daily., Disp: 30 tablet, Rfl: 0    losartan-hydrochlorothiazide (HYZAAR) 50-12.5 MG per tablet, Take 1 tablet by mouth Every Morning., Disp: , Rfl:     Multiple Vitamins-Minerals (EYE VITAMINS PO), Take  by mouth., Disp: , Rfl:     omeprazole (priLOSEC) 20 MG capsule, Take 1 capsule by mouth Daily., Disp: , Rfl:     sertraline (ZOLOFT) 100 MG tablet, Take 1 tablet by mouth Daily., Disp: , Rfl:     sodium bicarbonate 650 MG tablet, TAKE ONE TABLET BY MOUTH DAILY, Disp: , Rfl:     Physical Exam:  Vitals:    07/27/23 1006   BP: 120/64   BP Location: Left arm   Patient Position: Sitting   Cuff Size: Adult   Pulse: 88   SpO2: 98%   Weight: 90.5 kg (199 lb 8 oz)   Height: 167.6 cm (66\")   PainSc: 0-No pain   PainLoc: Chest     Current Pain Level: none  Pulse Ox: Normal  on room air  General: alert, appears stated age and cooperative     Body Habitus: well-nourished    HEENT: Head: Normocephalic, no lesions, without obvious abnormality. No arcus senilis, xanthelasma or xanthomas.    Neuro: alert, oriented x3  Pulses: 2+ and symmetric  JVP: Volume/Pulsation: Normal.  Normal waveforms.   Appropriate inspiratory decrease.  No Kussmaul's. No Alba's.   Carotid Exam: no bruit normal pulsation bilaterally   Carotid Volume: normal.     Respirations: no increased work of breathing   Chest:  Normal    Pulmonary:Normal   Precordium: Normal impulses. P2 is not palpable.  RV Heave: absent  LV Heave: absent  [place]:  normal size and placement  Palpable S4: absent.  Heart rate: normal    Heart Rhythm: regular     Heart Sounds: S1: normal  S2: normal  S3: absent   S4: absent  Opening Snap: absent    Pericardial Rub:  Absent: .    Abdomen:   Appearance: " normal .  Palpation: Soft, non-tender to palpation, bowel sounds positive in all four quadrants; no guarding or rebound tenderness  Extremity: no edema.   LE Skin: no rashes  LE Hair:  normal  LE Pulses: well perfused with normal pulses in the distal extremities  Pallor on elevation: Absent. Rubor on dependency: None    I reviewed the ROS as documented here and confirmed the accuracy of it with the patient today. 7/27/2023       DATA REVIEWED:     Normal sinus rhythm, LVH with repolarization abnormality.    ECG/EMG Results (all)       None          ---------------------------------------------------  TTE/JOHN:  Results for orders placed in visit on 03/09/23    Adult Transthoracic Echo Complete W/ Cont if Necessary Per Protocol    Interpretation Summary    Left ventricular systolic function is normal. Left ventricular ejection fraction appears to be 56 - 60%.    Left ventricular wall thickness is consistent with mild concentric hypertrophy.    Left ventricular diastolic function is consistent with (grade II w/high LAP) pseudonormalization.    The right ventricular cavity is mildly dilated.    Left atrial volume is moderately increased.    CT:   No radiology results for the last 30 days.        --------------------------------------------------------------------------------------------------  LABS:     The CVD Risk score (Jose Elias, et al., 2008) failed to calculate for the following reasons:    The patient has a prior MI, stroke, CHF, or peripheral vascular disease diagnosis         Lab Results   Component Value Date    GLUCOSE 101 (H) 05/30/2023    BUN 34 (H) 05/30/2023    CREATININE 2.10 (H) 05/30/2023    EGFRIFNONA 33 (L) 09/14/2020    EGFRIFAFRI 45 01/07/2021    BCR 16.2 05/30/2023    K 4.5 05/30/2023    CO2 23.0 05/30/2023    CALCIUM 9.1 05/30/2023    ALBUMIN 4.1 05/30/2023    AST 14 03/09/2023    ALT 17 03/09/2023     Lab Results   Component Value Date    WBC 8.18 03/09/2023    HGB 12.3 (L) 03/09/2023    HCT  35.8 (L) 03/09/2023    MCV 91.3 03/09/2023     03/09/2023     Lab Results   Component Value Date    CHOL 133 08/17/2020    TRIG 176 (H) 08/17/2020    HDL 33 (L) 08/17/2020    LDL 65 08/17/2020     Lab Results   Component Value Date    TSH 2.470 03/09/2023     Lab Results   Component Value Date    CKMB 3.5 12/16/2019    TROPONINI <0.015 07/13/2020    TROPONINT 0.234 (C) 12/21/2019     Lab Results   Component Value Date    HGBA1C 5.4 04/23/2015     No results found for: DDIMER  Lab Results   Component Value Date    ALT 17 03/09/2023     Lab Results   Component Value Date    HGBA1C 5.4 04/23/2015     Lab Results   Component Value Date    CREATININE 2.10 (H) 05/30/2023     No results found for: IRON, TIBC, FERRITIN  Lab Results   Component Value Date    INR 1.07 12/20/2019    INR 1.5 05/05/2015    INR 1.1 04/23/2015    PROTIME 13.7 12/20/2019    PROTIME 17.6 (H) 05/05/2015    PROTIME 13.7 04/23/2015       Interpretation Summary       Appears to be increased velocities proximal renal artery bilaterally  Appears to be two left renal arteries, measurements taken from vessel with highest velocity  Limited exam due to bowel gas     Incidental finding: anechoic structure right kidney, possible cyst   Incidental finding: possible mild hydronephrosis of the left kidney      Right renal artery: Peak systolic velocity of 227cm/sec, EDV of 48cm/sec and RAR <3 consistent with 0-60% stenosis     Left renal artery: Left proximal renal artery PSV 317cm/sec, EDV of 58cm/sec and RAR of 3.05cm/sec suggestive of >60% stenosis.         Recommend dedicated renal US to assess for cyst and hydronephrosis      IMPRESSION:  Mild left hydronephrosis, unchanged from 2019, favoring chronic.     Bilateral renal cortical thinning can be seen with chronic  medical renal disease.     Simple cyst in the right inferior renal pole.     Prostate hypertrophy.    Assessment/Plan     1.    Coronary artery disease:  Known history of coronary artery  disease status post PCI to OM1 in 2009, proximal RCA in 2011, status post CABG with a LIMA to LAD and a sequential graft to RPL and RPDA in 2015 has presented to the hospital with new onset chest pain.  EKG showed ST depressions in lateral leads.  Trending troponin to 0.2. 2019  Patient was taken to the Cath Lab where he was found to have patent SVG and LIMA.  He had severe in-stent restenosis of stent in the OM1.  For which he underwent successful PCI with a 3.0 x 28 mm Xience Cele stent with excellent angiographic results.  Patient tolerated the procedure well..  Echocardiogram with normal LV function.  Continue aspirin Plavix.    We performed a nuclear stress test for fatigue and lack of energy.  No significant ischemia noted.  EF was mildly reduced.  We discussed options of invasive evaluation however since he is not having significant chest pain he has advanced CKD we held off on invasive evaluation.     2.  Hypertension:  On carvedilol 25 mg, Imdur 240mg, Hydralazine 50mg TID. Couldn't tolerate amlodipine.   I added nifedipine, with improvement in blood pressure but he stopped taking it.  He does have CKD with cr in rage of 2-1.6  Previously could not tolerate ACE inhibitor.    Was having fluctuation in blood pressure.  Start him back on nifedipine at some improvement but he was having intermittent hypotension.  He has been seen by nephrology and has been started on losartan/hydrochlorothiazide 50/12.5.  Renal ultrasound showed hydronephrosis on the left side.    Reviewed his renal arterial ultrasound, he does have a potential moderate disease of 60 to 80% on the left side and 60% the right side.  He also had a dedicated renal ultrasound which showed benign cyst and mild hydronephrosis..  Will discuss with nephrology, if patient were to have progression of chronic kidney disease can consider potential angioplasty however that would expose him to contrast again.  Can refer him for a CO2 angiogram if continues  to have issues with uncontrolled hypertension.    Reiterated importance of compliance with medications       3.  Hyperlipidemia:   Patient has been allergic to statins.    He has been started on PCSK9 better severe.  His last lipid panel in February showed an LDL of 61 and triglycerides of 85 with a total cholesterol of 112 and HDL of 34.       4.  CKD:  Follows with nephrology    5.  Shortness of breath: Improved  Factorial from obesity, hypertension, diastolic dysfunction.  I encouraged exercise, weight loss and will be enrolled in cardiac rehab.    PFTs are normal.    6.  Lack of energy:  Patient is complaining of lack of energy.  EKG is atrial and PACs.  Echocardiogram was with preserved LV systolic function.  He does have grade 2 diastolic dysfunction.  His PAC burden was frequent.  Short lasting episodes of SVT.  Continue carvedilol.          Patient's Body mass index is 31.7 kg/m². BMI is above normal parameters. Recommendations include: exercise counseling and nutrition counseling.        Kenya Torres is not a smoker     AAA Screening:   Not needed          This document has been electronically signed by Karime Moore MD on July 27, 2023 10:11 CDT

## (undated) DEVICE — PK CATH LAB 60

## (undated) DEVICE — RADIFOCUS OPTITORQUE ANGIOGRAPHIC CATHETER: Brand: OPTITORQUE

## (undated) DEVICE — A2000 MULTI-USE SYRINGE KIT, P/N 701277-003KIT CONTENTS: 100ML CONTRAST RESERVOIR AND TUBING WITH CONTRAST SPIKE AND CLAMP: Brand: A2000 MULTI-USE SYRINGE KIT

## (undated) DEVICE — INTRO SHEATH ART/FEM ENGAGE .038 6F12CM

## (undated) DEVICE — CATH DIAG EXPO .056 FL4 6F 100CM

## (undated) DEVICE — GLIDESHEATH SLENDER STAINLESS STEEL KIT: Brand: GLIDESHEATH SLENDER

## (undated) DEVICE — CATH DIAG EXPO .056 IM 6F 100CM

## (undated) DEVICE — MODEL BT2000 P/N 700287-012KIT CONTENTS: MANIFOLD WITH SALINE AND CONTRAST PORTS, SALINE TUBING WITH SPIKE AND HAND SYRINGE, TRANSDUCER: Brand: BT2000 AUTOMATED MANIFOLD KIT

## (undated) DEVICE — COPILOT KIT INCLUDES BLEEDBACK CONTROL VALVE / GUIDE WIRE INTRODUCER / TORQUE DEVICE: Brand: ACCESSORIES

## (undated) DEVICE — DEV INFL MONARCH 20ML

## (undated) DEVICE — CATH GUIDE LAUNCHER EBU3.5 W/SH 6F 100CM

## (undated) DEVICE — GW PERIPH GUIDERIGHT STD/J/TP PTFE/PCOAT SS 0.038IN 5X150CM

## (undated) DEVICE — KT INTRO MINISTICK MAX W/GW NITNL/TUNG ECHO 4F 21G 7CM

## (undated) DEVICE — ANGIO-SEAL VIP VASCULAR CLOSURE DEVICE: Brand: ANGIO-SEAL

## (undated) DEVICE — CATH DIAG EXPO .052 MPA2 6F 100CM

## (undated) DEVICE — RUNTHROUGH NS EXTRA FLOPPY PTCA GUIDEWIRE: Brand: RUNTHROUGH

## (undated) DEVICE — ELECTRODE,RT,STRESS,FOAM,50PK: Brand: MEDLINE

## (undated) DEVICE — CATH DIAG EXPO .056 FR4 6F 100CM

## (undated) DEVICE — GW PERIPH GUIDERIGHT STD/EXCHNG/J/TIP SS 0.038IN 5X260CM

## (undated) DEVICE — NC TREK CORONARY DILATATION CATHETER 3.0 MM X 20 MM / RAPID-EXCHANGE: Brand: NC TREK

## (undated) DEVICE — TREK CORONARY DILATATION CATHETER 2.50 MM X 15 MM / RAPID-EXCHANGE: Brand: TREK

## (undated) DEVICE — CATH IMG IVUS EAGLE EYE RAPDXNG PLAT SHT/TP 5F .014IN